# Patient Record
Sex: FEMALE | Race: WHITE
[De-identification: names, ages, dates, MRNs, and addresses within clinical notes are randomized per-mention and may not be internally consistent; named-entity substitution may affect disease eponyms.]

---

## 2019-02-26 ENCOUNTER — HOSPITAL ENCOUNTER (OUTPATIENT)
Dept: HOSPITAL 8 - CFH | Age: 54
Discharge: HOME | End: 2019-02-26
Attending: FAMILY MEDICINE
Payer: COMMERCIAL

## 2019-02-26 DIAGNOSIS — N60.02: Primary | ICD-10-CM

## 2021-01-19 ENCOUNTER — NON-PROVIDER VISIT (OUTPATIENT)
Dept: OCCUPATIONAL MEDICINE | Facility: CLINIC | Age: 56
End: 2021-01-19

## 2021-01-19 DIAGNOSIS — Z11.1 ENCOUNTER FOR PPD TEST: ICD-10-CM

## 2021-01-19 DIAGNOSIS — Z02.1 PRE-EMPLOYMENT DRUG SCREENING: ICD-10-CM

## 2021-01-19 LAB
AMP AMPHETAMINE: NORMAL
COC COCAINE: NORMAL
INT CON NEG: NORMAL
INT CON POS: NORMAL
MET METHAMPHETAMINES: NORMAL
OPI OPIATES: NORMAL
PCP PHENCYCLIDINE: NORMAL
POC DRUG COMMENT 753798-OCCUPATIONAL HEALTH: NEGATIVE
THC: NORMAL

## 2021-01-19 PROCEDURE — 86580 TB INTRADERMAL TEST: CPT | Performed by: PREVENTIVE MEDICINE

## 2021-01-19 PROCEDURE — 80305 DRUG TEST PRSMV DIR OPT OBS: CPT | Performed by: PREVENTIVE MEDICINE

## 2021-01-21 ENCOUNTER — NON-PROVIDER VISIT (OUTPATIENT)
Dept: OCCUPATIONAL MEDICINE | Facility: CLINIC | Age: 56
End: 2021-01-21

## 2021-01-21 LAB — TB WHEAL 3D P 5 TU DIAM: NORMAL MM

## 2022-05-17 ENCOUNTER — PRE-ADMISSION TESTING (OUTPATIENT)
Dept: ADMISSIONS | Facility: MEDICAL CENTER | Age: 57
End: 2022-05-17
Attending: ORTHOPAEDIC SURGERY
Payer: COMMERCIAL

## 2022-05-17 DIAGNOSIS — Z01.810 PRE-OPERATIVE CARDIOVASCULAR EXAMINATION: ICD-10-CM

## 2022-05-17 DIAGNOSIS — Z01.812 PRE-OPERATIVE LABORATORY EXAMINATION: ICD-10-CM

## 2022-05-17 LAB
ABO GROUP BLD: NORMAL
ANION GAP SERPL CALC-SCNC: 15 MMOL/L (ref 7–16)
BUN SERPL-MCNC: 14 MG/DL (ref 8–22)
CALCIUM SERPL-MCNC: 9.7 MG/DL (ref 8.4–10.2)
CHLORIDE SERPL-SCNC: 102 MMOL/L (ref 96–112)
CO2 SERPL-SCNC: 26 MMOL/L (ref 20–33)
CREAT SERPL-MCNC: 0.57 MG/DL (ref 0.5–1.4)
ERYTHROCYTE [DISTWIDTH] IN BLOOD BY AUTOMATED COUNT: 38.9 FL (ref 35.9–50)
GFR SERPLBLD CREATININE-BSD FMLA CKD-EPI: 106 ML/MIN/1.73 M 2
GLUCOSE SERPL-MCNC: 97 MG/DL (ref 65–99)
HCT VFR BLD AUTO: 40.7 % (ref 37–47)
HGB BLD-MCNC: 13.5 G/DL (ref 12–16)
MCH RBC QN AUTO: 30.1 PG (ref 27–33)
MCHC RBC AUTO-ENTMCNC: 33.2 G/DL (ref 33.6–35)
MCV RBC AUTO: 90.8 FL (ref 81.4–97.8)
PLATELET # BLD AUTO: 255 K/UL (ref 164–446)
PMV BLD AUTO: 11.1 FL (ref 9–12.9)
POTASSIUM SERPL-SCNC: 3.6 MMOL/L (ref 3.6–5.5)
RBC # BLD AUTO: 4.48 M/UL (ref 4.2–5.4)
RH BLD: NORMAL
SCCMEC + MECA PNL NOSE NAA+PROBE: NEGATIVE
SCCMEC + MECA PNL NOSE NAA+PROBE: NEGATIVE
SODIUM SERPL-SCNC: 143 MMOL/L (ref 135–145)
WBC # BLD AUTO: 8.1 K/UL (ref 4.8–10.8)

## 2022-05-17 PROCEDURE — 93005 ELECTROCARDIOGRAM TRACING: CPT

## 2022-05-17 PROCEDURE — 87641 MR-STAPH DNA AMP PROBE: CPT

## 2022-05-17 PROCEDURE — 86901 BLOOD TYPING SEROLOGIC RH(D): CPT

## 2022-05-17 PROCEDURE — 87640 STAPH A DNA AMP PROBE: CPT

## 2022-05-17 PROCEDURE — 86900 BLOOD TYPING SEROLOGIC ABO: CPT

## 2022-05-17 PROCEDURE — 85027 COMPLETE CBC AUTOMATED: CPT

## 2022-05-17 PROCEDURE — 36415 COLL VENOUS BLD VENIPUNCTURE: CPT

## 2022-05-17 PROCEDURE — 80048 BASIC METABOLIC PNL TOTAL CA: CPT

## 2022-05-17 RX ORDER — BENZONATATE 100 MG/1
CAPSULE ORAL
COMMUNITY
End: 2022-05-17

## 2022-05-17 RX ORDER — CITALOPRAM HYDROBROMIDE 10 MG/1
10 TABLET ORAL DAILY
COMMUNITY
Start: 2022-01-01

## 2022-05-17 RX ORDER — DOXYCYCLINE HYCLATE 100 MG
TABLET ORAL
COMMUNITY
End: 2022-05-17

## 2022-05-17 RX ORDER — OXYCODONE AND ACETAMINOPHEN 10; 325 MG/1; MG/1
TABLET ORAL
Status: ON HOLD | COMMUNITY
Start: 2022-05-16 | End: 2022-10-30

## 2022-05-17 RX ORDER — ALBUTEROL SULFATE 90 UG/1
AEROSOL, METERED RESPIRATORY (INHALATION)
COMMUNITY
Start: 2020-08-11 | End: 2022-08-06

## 2022-05-17 RX ORDER — FLUTICASONE PROPIONATE 50 MCG
1 SPRAY, SUSPENSION (ML) NASAL PRN
COMMUNITY

## 2022-05-17 RX ORDER — MELOXICAM 7.5 MG/1
7.5 TABLET ORAL PRN
Status: ON HOLD | COMMUNITY
End: 2022-11-04

## 2022-05-17 RX ORDER — HYDROCHLOROTHIAZIDE 12.5 MG/1
TABLET ORAL
COMMUNITY
End: 2022-05-17

## 2022-05-17 RX ORDER — LISINOPRIL AND HYDROCHLOROTHIAZIDE 20; 12.5 MG/1; MG/1
1 TABLET ORAL DAILY
COMMUNITY
Start: 2022-01-01

## 2022-05-17 RX ORDER — PREDNISONE 10 MG/1
TABLET ORAL
COMMUNITY
End: 2022-05-17

## 2022-05-17 RX ORDER — LISINOPRIL 10 MG/1
TABLET ORAL
COMMUNITY
End: 2022-05-17

## 2022-05-17 RX ORDER — BUDESONIDE AND FORMOTEROL FUMARATE DIHYDRATE 160; 4.5 UG/1; UG/1
2 AEROSOL RESPIRATORY (INHALATION) 2 TIMES DAILY
Status: ON HOLD | COMMUNITY
End: 2022-10-30

## 2022-05-17 RX ORDER — ASPIRIN 81 MG/1
TABLET, COATED ORAL
COMMUNITY
Start: 2022-05-16 | End: 2022-05-17

## 2022-05-17 RX ORDER — OLOPATADINE HYDROCHLORIDE 665 UG/1
SPRAY NASAL
COMMUNITY
End: 2022-05-17

## 2022-05-17 RX ORDER — ASPIRIN 81 MG/1
TABLET ORAL
COMMUNITY
End: 2022-05-17

## 2022-05-17 RX ORDER — FENOFIBRATE 160 MG/1
160 TABLET ORAL
COMMUNITY
Start: 2022-01-01

## 2022-05-17 RX ORDER — AZITHROMYCIN 250 MG/1
TABLET, FILM COATED ORAL
COMMUNITY
End: 2022-05-17

## 2022-05-17 RX ORDER — DOCUSATE SODIUM 100 MG/1
100 CAPSULE, LIQUID FILLED ORAL 2 TIMES DAILY PRN
COMMUNITY
End: 2023-05-29

## 2022-05-17 RX ORDER — TRAZODONE HYDROCHLORIDE 50 MG/1
50 TABLET ORAL NIGHTLY
COMMUNITY
Start: 2022-01-01

## 2022-05-17 RX ORDER — ROSUVASTATIN CALCIUM 10 MG/1
10 TABLET, COATED ORAL
COMMUNITY
Start: 2022-01-01

## 2022-05-17 RX ORDER — EZETIMIBE 10 MG/1
TABLET ORAL
COMMUNITY
Start: 2022-03-23 | End: 2022-05-17

## 2022-05-17 RX ORDER — LISINOPRIL AND HYDROCHLOROTHIAZIDE 12.5; 1 MG/1; MG/1
TABLET ORAL
COMMUNITY
End: 2022-05-17

## 2022-05-17 NOTE — OR NURSING
"Preadmit appointment: \" Preparing for your Procedure information\" sheet given to patient with verbal and written instructions. Patient instructed to continue prescribed medications through the day before surgery, instructed to take the following medications the day of surgery per anesthesia protocol: ALBUTEROL, written and verbal instructions given to bring DOS; pt verbally repeated meds instructions.           Verbal and written, and pre-admit video website instructions provided.     Pt states she had a stress test and cardiac CT at [facility] on 05/05/22; FAX [facility] requesting test reports FAX to [phone] STAT.     "

## 2022-05-17 NOTE — DISCHARGE PLANNING
DISCHARGE PLANNING NOTE - TOTAL JOINT    Procedure: Procedure(s):  ARTHROPLASTY, HIP, TOTAL - ANY OTHER INDICATED PROCEDURES  Procedure Date: 5/19/2022  Insurance: Payor: [employer] / Plan: [place] BCBS / Product Type: *No Product type* /    Equipment currently available at home?  raised toilet seat  Steps into the home? 1  Steps within the home? 0  Toilet height? Standard  Type of shower? walk-in shower  Who will be with you during your recovery? Spouse.  Is Outpatient Physical Therapy set up after surgery? No   Did you take the Total Joint Class and where? Yes  Planning same day discharge?Yes     This writer met with pt and spouse during her preadmission appt. Pt will need a fww. Home safety checklist reviewed and copy given to pt. Pt educated to dc criteria. All questions answered and verbalizes understanding of all instructions. No dc needs identified at this time. Anticipate dc to home without barriers.

## 2022-05-18 LAB — EKG IMPRESSION: NORMAL

## 2022-05-18 PROCEDURE — 93010 ELECTROCARDIOGRAM REPORT: CPT | Performed by: INTERNAL MEDICINE

## 2022-05-19 ENCOUNTER — HOSPITAL ENCOUNTER (OUTPATIENT)
Facility: MEDICAL CENTER | Age: 57
End: 2022-05-19
Attending: ORTHOPAEDIC SURGERY | Admitting: ORTHOPAEDIC SURGERY
Payer: COMMERCIAL

## 2022-05-19 ENCOUNTER — ANESTHESIA EVENT (OUTPATIENT)
Dept: SURGERY | Facility: MEDICAL CENTER | Age: 57
End: 2022-05-19
Payer: COMMERCIAL

## 2022-05-19 ENCOUNTER — ANESTHESIA (OUTPATIENT)
Dept: SURGERY | Facility: MEDICAL CENTER | Age: 57
End: 2022-05-19
Payer: COMMERCIAL

## 2022-05-19 ENCOUNTER — APPOINTMENT (OUTPATIENT)
Dept: RADIOLOGY | Facility: MEDICAL CENTER | Age: 57
End: 2022-05-19
Attending: ORTHOPAEDIC SURGERY
Payer: COMMERCIAL

## 2022-05-19 VITALS
TEMPERATURE: 97.1 F | HEART RATE: 100 BPM | DIASTOLIC BLOOD PRESSURE: 73 MMHG | WEIGHT: 215.39 LBS | BODY MASS INDEX: 39.64 KG/M2 | HEIGHT: 62 IN | RESPIRATION RATE: 18 BRPM | OXYGEN SATURATION: 93 % | SYSTOLIC BLOOD PRESSURE: 119 MMHG

## 2022-05-19 DIAGNOSIS — Z96.642 S/P TOTAL LEFT HIP ARTHROPLASTY: ICD-10-CM

## 2022-05-19 DIAGNOSIS — Z01.812 PRE-OPERATIVE LABORATORY EXAMINATION: ICD-10-CM

## 2022-05-19 LAB
ABO GROUP BLD: NORMAL
BLD GP AB SCN SERPL QL: NORMAL
RH BLD: NORMAL

## 2022-05-19 PROCEDURE — 72170 X-RAY EXAM OF PELVIS: CPT

## 2022-05-19 PROCEDURE — A9270 NON-COVERED ITEM OR SERVICE: HCPCS | Performed by: ORTHOPAEDIC SURGERY

## 2022-05-19 PROCEDURE — 01214 ANES OPEN PX TOT HIP ARTHRP: CPT | Performed by: ANESTHESIOLOGY

## 2022-05-19 PROCEDURE — 96375 TX/PRO/DX INJ NEW DRUG ADDON: CPT

## 2022-05-19 PROCEDURE — 94760 N-INVAS EAR/PLS OXIMETRY 1: CPT

## 2022-05-19 PROCEDURE — 700111 HCHG RX REV CODE 636 W/ 250 OVERRIDE (IP): Performed by: ANESTHESIOLOGY

## 2022-05-19 PROCEDURE — C1776 JOINT DEVICE (IMPLANTABLE): HCPCS | Performed by: ORTHOPAEDIC SURGERY

## 2022-05-19 PROCEDURE — 86901 BLOOD TYPING SEROLOGIC RH(D): CPT

## 2022-05-19 PROCEDURE — 160042 HCHG SURGERY MINUTES - EA ADDL 1 MIN LEVEL 5: Performed by: ORTHOPAEDIC SURGERY

## 2022-05-19 PROCEDURE — 700101 HCHG RX REV CODE 250: Performed by: ANESTHESIOLOGY

## 2022-05-19 PROCEDURE — 160009 HCHG ANES TIME/MIN: Performed by: ORTHOPAEDIC SURGERY

## 2022-05-19 PROCEDURE — A9270 NON-COVERED ITEM OR SERVICE: HCPCS | Performed by: ANESTHESIOLOGY

## 2022-05-19 PROCEDURE — 160035 HCHG PACU - 1ST 60 MINS PHASE I: Performed by: ORTHOPAEDIC SURGERY

## 2022-05-19 PROCEDURE — 700105 HCHG RX REV CODE 258: Performed by: ORTHOPAEDIC SURGERY

## 2022-05-19 PROCEDURE — 96365 THER/PROPH/DIAG IV INF INIT: CPT

## 2022-05-19 PROCEDURE — C1713 ANCHOR/SCREW BN/BN,TIS/BN: HCPCS | Performed by: ORTHOPAEDIC SURGERY

## 2022-05-19 PROCEDURE — 700102 HCHG RX REV CODE 250 W/ 637 OVERRIDE(OP): Performed by: ANESTHESIOLOGY

## 2022-05-19 PROCEDURE — 97162 PT EVAL MOD COMPLEX 30 MIN: CPT

## 2022-05-19 PROCEDURE — 501838 HCHG SUTURE GENERAL: Performed by: ORTHOPAEDIC SURGERY

## 2022-05-19 PROCEDURE — 36415 COLL VENOUS BLD VENIPUNCTURE: CPT

## 2022-05-19 PROCEDURE — 700101 HCHG RX REV CODE 250: Performed by: ORTHOPAEDIC SURGERY

## 2022-05-19 PROCEDURE — G0378 HOSPITAL OBSERVATION PER HR: HCPCS

## 2022-05-19 PROCEDURE — 160031 HCHG SURGERY MINUTES - 1ST 30 MINS LEVEL 5: Performed by: ORTHOPAEDIC SURGERY

## 2022-05-19 PROCEDURE — 160036 HCHG PACU - EA ADDL 30 MINS PHASE I: Performed by: ORTHOPAEDIC SURGERY

## 2022-05-19 PROCEDURE — 700102 HCHG RX REV CODE 250 W/ 637 OVERRIDE(OP): Performed by: ORTHOPAEDIC SURGERY

## 2022-05-19 PROCEDURE — 700111 HCHG RX REV CODE 636 W/ 250 OVERRIDE (IP): Performed by: ORTHOPAEDIC SURGERY

## 2022-05-19 PROCEDURE — 97165 OT EVAL LOW COMPLEX 30 MIN: CPT

## 2022-05-19 PROCEDURE — 502000 HCHG MISC OR IMPLANTS RC 0278: Performed by: ORTHOPAEDIC SURGERY

## 2022-05-19 PROCEDURE — 160048 HCHG OR STATISTICAL LEVEL 1-5: Performed by: ORTHOPAEDIC SURGERY

## 2022-05-19 PROCEDURE — 86850 RBC ANTIBODY SCREEN: CPT

## 2022-05-19 PROCEDURE — 160002 HCHG RECOVERY MINUTES (STAT): Performed by: ORTHOPAEDIC SURGERY

## 2022-05-19 PROCEDURE — 96376 TX/PRO/DX INJ SAME DRUG ADON: CPT

## 2022-05-19 PROCEDURE — 97535 SELF CARE MNGMENT TRAINING: CPT

## 2022-05-19 DEVICE — IMPLANTABLE DEVICE: Type: IMPLANTABLE DEVICE | Site: HIP | Status: FUNCTIONAL

## 2022-05-19 RX ORDER — ONDANSETRON 2 MG/ML
4 INJECTION INTRAMUSCULAR; INTRAVENOUS
Status: DISCONTINUED | OUTPATIENT
Start: 2022-05-19 | End: 2022-05-19 | Stop reason: HOSPADM

## 2022-05-19 RX ORDER — LIDOCAINE HYDROCHLORIDE 20 MG/ML
INJECTION, SOLUTION EPIDURAL; INFILTRATION; INTRACAUDAL; PERINEURAL PRN
Status: DISCONTINUED | OUTPATIENT
Start: 2022-05-19 | End: 2022-05-19 | Stop reason: SURG

## 2022-05-19 RX ORDER — ACETAMINOPHEN 500 MG
1000 TABLET ORAL EVERY 6 HOURS PRN
Status: DISCONTINUED | OUTPATIENT
Start: 2022-05-24 | End: 2022-05-19 | Stop reason: HOSPADM

## 2022-05-19 RX ORDER — ACETAMINOPHEN 500 MG
1000 TABLET ORAL ONCE
Status: COMPLETED | OUTPATIENT
Start: 2022-05-19 | End: 2022-05-19

## 2022-05-19 RX ORDER — HYDRALAZINE HYDROCHLORIDE 20 MG/ML
5 INJECTION INTRAMUSCULAR; INTRAVENOUS
Status: DISCONTINUED | OUTPATIENT
Start: 2022-05-19 | End: 2022-05-19 | Stop reason: HOSPADM

## 2022-05-19 RX ORDER — OXYCODONE HCL 10 MG/1
10 TABLET, FILM COATED, EXTENDED RELEASE ORAL ONCE
Status: COMPLETED | OUTPATIENT
Start: 2022-05-19 | End: 2022-05-19

## 2022-05-19 RX ORDER — ENEMA 19; 7 G/133ML; G/133ML
1 ENEMA RECTAL
Status: DISCONTINUED | OUTPATIENT
Start: 2022-05-19 | End: 2022-05-19 | Stop reason: HOSPADM

## 2022-05-19 RX ORDER — OXYCODONE HCL 5 MG/5 ML
10 SOLUTION, ORAL ORAL
Status: COMPLETED | OUTPATIENT
Start: 2022-05-19 | End: 2022-05-19

## 2022-05-19 RX ORDER — HYDROMORPHONE HYDROCHLORIDE 1 MG/ML
0.5 INJECTION, SOLUTION INTRAMUSCULAR; INTRAVENOUS; SUBCUTANEOUS
Status: DISCONTINUED | OUTPATIENT
Start: 2022-05-19 | End: 2022-05-19 | Stop reason: HOSPADM

## 2022-05-19 RX ORDER — CITALOPRAM 20 MG/1
10 TABLET ORAL DAILY
Status: DISCONTINUED | OUTPATIENT
Start: 2022-05-20 | End: 2022-05-19 | Stop reason: HOSPADM

## 2022-05-19 RX ORDER — HYDROMORPHONE HYDROCHLORIDE 1 MG/ML
0.4 INJECTION, SOLUTION INTRAMUSCULAR; INTRAVENOUS; SUBCUTANEOUS
Status: DISCONTINUED | OUTPATIENT
Start: 2022-05-19 | End: 2022-05-19 | Stop reason: HOSPADM

## 2022-05-19 RX ORDER — ROCURONIUM BROMIDE 10 MG/ML
INJECTION, SOLUTION INTRAVENOUS PRN
Status: DISCONTINUED | OUTPATIENT
Start: 2022-05-19 | End: 2022-05-19 | Stop reason: SURG

## 2022-05-19 RX ORDER — TRANEXAMIC ACID 100 MG/ML
INJECTION, SOLUTION INTRAVENOUS PRN
Status: DISCONTINUED | OUTPATIENT
Start: 2022-05-19 | End: 2022-05-19 | Stop reason: SURG

## 2022-05-19 RX ORDER — IBUPROFEN 400 MG/1
800 TABLET ORAL 3 TIMES DAILY PRN
Status: DISCONTINUED | OUTPATIENT
Start: 2022-05-22 | End: 2022-05-19 | Stop reason: HOSPADM

## 2022-05-19 RX ORDER — MIDAZOLAM HYDROCHLORIDE 1 MG/ML
INJECTION INTRAMUSCULAR; INTRAVENOUS PRN
Status: DISCONTINUED | OUTPATIENT
Start: 2022-05-19 | End: 2022-05-19 | Stop reason: SURG

## 2022-05-19 RX ORDER — MIDAZOLAM HYDROCHLORIDE 1 MG/ML
1 INJECTION INTRAMUSCULAR; INTRAVENOUS
Status: DISCONTINUED | OUTPATIENT
Start: 2022-05-19 | End: 2022-05-19 | Stop reason: HOSPADM

## 2022-05-19 RX ORDER — DIPHENHYDRAMINE HYDROCHLORIDE 50 MG/ML
12.5 INJECTION INTRAMUSCULAR; INTRAVENOUS
Status: DISCONTINUED | OUTPATIENT
Start: 2022-05-19 | End: 2022-05-19 | Stop reason: HOSPADM

## 2022-05-19 RX ORDER — FENOFIBRATE 134 MG/1
134 CAPSULE ORAL
Status: DISCONTINUED | OUTPATIENT
Start: 2022-05-19 | End: 2022-05-19 | Stop reason: HOSPADM

## 2022-05-19 RX ORDER — POLYETHYLENE GLYCOL 3350 17 G/17G
1 POWDER, FOR SOLUTION ORAL 2 TIMES DAILY PRN
Status: DISCONTINUED | OUTPATIENT
Start: 2022-05-19 | End: 2022-05-19 | Stop reason: HOSPADM

## 2022-05-19 RX ORDER — DIPHENHYDRAMINE HYDROCHLORIDE 50 MG/ML
25 INJECTION INTRAMUSCULAR; INTRAVENOUS EVERY 6 HOURS PRN
Status: DISCONTINUED | OUTPATIENT
Start: 2022-05-19 | End: 2022-05-19 | Stop reason: HOSPADM

## 2022-05-19 RX ORDER — CELECOXIB 200 MG/1
400 CAPSULE ORAL ONCE
Status: COMPLETED | OUTPATIENT
Start: 2022-05-19 | End: 2022-05-19

## 2022-05-19 RX ORDER — TRAZODONE HYDROCHLORIDE 50 MG/1
50 TABLET ORAL NIGHTLY
Status: DISCONTINUED | OUTPATIENT
Start: 2022-05-19 | End: 2022-05-19 | Stop reason: HOSPADM

## 2022-05-19 RX ORDER — DOCUSATE SODIUM 100 MG/1
100 CAPSULE, LIQUID FILLED ORAL 2 TIMES DAILY
Status: DISCONTINUED | OUTPATIENT
Start: 2022-05-19 | End: 2022-05-19 | Stop reason: HOSPADM

## 2022-05-19 RX ORDER — HYDROMORPHONE HYDROCHLORIDE 1 MG/ML
0.2 INJECTION, SOLUTION INTRAMUSCULAR; INTRAVENOUS; SUBCUTANEOUS
Status: DISCONTINUED | OUTPATIENT
Start: 2022-05-19 | End: 2022-05-19 | Stop reason: HOSPADM

## 2022-05-19 RX ORDER — ASCORBIC ACID 500 MG
500 TABLET ORAL DAILY
Status: DISCONTINUED | OUTPATIENT
Start: 2022-05-19 | End: 2022-05-19 | Stop reason: HOSPADM

## 2022-05-19 RX ORDER — METOPROLOL TARTRATE 1 MG/ML
1 INJECTION, SOLUTION INTRAVENOUS
Status: DISCONTINUED | OUTPATIENT
Start: 2022-05-19 | End: 2022-05-19 | Stop reason: HOSPADM

## 2022-05-19 RX ORDER — AMOXICILLIN 250 MG
1 CAPSULE ORAL
Status: DISCONTINUED | OUTPATIENT
Start: 2022-05-19 | End: 2022-05-19 | Stop reason: HOSPADM

## 2022-05-19 RX ORDER — SODIUM CHLORIDE, SODIUM LACTATE, POTASSIUM CHLORIDE, CALCIUM CHLORIDE 600; 310; 30; 20 MG/100ML; MG/100ML; MG/100ML; MG/100ML
INJECTION, SOLUTION INTRAVENOUS CONTINUOUS
Status: DISCONTINUED | OUTPATIENT
Start: 2022-05-19 | End: 2022-05-19 | Stop reason: HOSPADM

## 2022-05-19 RX ORDER — DOXYCYCLINE 100 MG/1
100 CAPSULE ORAL 2 TIMES DAILY
Qty: 14 CAPSULE | Refills: 0 | Status: SHIPPED | OUTPATIENT
Start: 2022-05-19 | End: 2022-05-26

## 2022-05-19 RX ORDER — ONDANSETRON 2 MG/ML
INJECTION INTRAMUSCULAR; INTRAVENOUS PRN
Status: DISCONTINUED | OUTPATIENT
Start: 2022-05-19 | End: 2022-05-19 | Stop reason: SURG

## 2022-05-19 RX ORDER — BUDESONIDE AND FORMOTEROL FUMARATE DIHYDRATE 160; 4.5 UG/1; UG/1
2 AEROSOL RESPIRATORY (INHALATION) 2 TIMES DAILY
Status: DISCONTINUED | OUTPATIENT
Start: 2022-05-19 | End: 2022-05-19 | Stop reason: HOSPADM

## 2022-05-19 RX ORDER — MEPERIDINE HYDROCHLORIDE 25 MG/ML
12.5 INJECTION INTRAMUSCULAR; INTRAVENOUS; SUBCUTANEOUS
Status: DISCONTINUED | OUTPATIENT
Start: 2022-05-19 | End: 2022-05-19 | Stop reason: HOSPADM

## 2022-05-19 RX ORDER — OXYCODONE HCL 5 MG/5 ML
5 SOLUTION, ORAL ORAL
Status: COMPLETED | OUTPATIENT
Start: 2022-05-19 | End: 2022-05-19

## 2022-05-19 RX ORDER — BISACODYL 10 MG
10 SUPPOSITORY, RECTAL RECTAL
Status: DISCONTINUED | OUTPATIENT
Start: 2022-05-19 | End: 2022-05-19 | Stop reason: HOSPADM

## 2022-05-19 RX ORDER — VANCOMYCIN HYDROCHLORIDE 1 G/20ML
INJECTION, POWDER, LYOPHILIZED, FOR SOLUTION INTRAVENOUS
Status: COMPLETED | OUTPATIENT
Start: 2022-05-19 | End: 2022-05-19

## 2022-05-19 RX ORDER — ONDANSETRON 2 MG/ML
4 INJECTION INTRAMUSCULAR; INTRAVENOUS EVERY 4 HOURS PRN
Status: DISCONTINUED | OUTPATIENT
Start: 2022-05-19 | End: 2022-05-19 | Stop reason: HOSPADM

## 2022-05-19 RX ORDER — CEFAZOLIN SODIUM 1 G/3ML
INJECTION, POWDER, FOR SOLUTION INTRAMUSCULAR; INTRAVENOUS PRN
Status: DISCONTINUED | OUTPATIENT
Start: 2022-05-19 | End: 2022-05-19 | Stop reason: SURG

## 2022-05-19 RX ORDER — SODIUM CHLORIDE, SODIUM LACTATE, POTASSIUM CHLORIDE, CALCIUM CHLORIDE 600; 310; 30; 20 MG/100ML; MG/100ML; MG/100ML; MG/100ML
INJECTION, SOLUTION INTRAVENOUS CONTINUOUS
Status: ACTIVE | OUTPATIENT
Start: 2022-05-19 | End: 2022-05-19

## 2022-05-19 RX ORDER — BUPIVACAINE HYDROCHLORIDE AND EPINEPHRINE 2.5; 5 MG/ML; UG/ML
INJECTION, SOLUTION EPIDURAL; INFILTRATION; INTRACAUDAL; PERINEURAL
Status: DISCONTINUED | OUTPATIENT
Start: 2022-05-19 | End: 2022-05-19 | Stop reason: HOSPADM

## 2022-05-19 RX ORDER — LISINOPRIL AND HYDROCHLOROTHIAZIDE 20; 12.5 MG/1; MG/1
1 TABLET ORAL DAILY
Status: DISCONTINUED | OUTPATIENT
Start: 2022-05-19 | End: 2022-05-19

## 2022-05-19 RX ORDER — HYDROMORPHONE HYDROCHLORIDE 1 MG/ML
0.1 INJECTION, SOLUTION INTRAMUSCULAR; INTRAVENOUS; SUBCUTANEOUS
Status: DISCONTINUED | OUTPATIENT
Start: 2022-05-19 | End: 2022-05-19 | Stop reason: HOSPADM

## 2022-05-19 RX ORDER — LISINOPRIL 20 MG/1
20 TABLET ORAL
Status: DISCONTINUED | OUTPATIENT
Start: 2022-05-19 | End: 2022-05-19 | Stop reason: HOSPADM

## 2022-05-19 RX ORDER — DEXAMETHASONE SODIUM PHOSPHATE 4 MG/ML
INJECTION, SOLUTION INTRA-ARTICULAR; INTRALESIONAL; INTRAMUSCULAR; INTRAVENOUS; SOFT TISSUE PRN
Status: DISCONTINUED | OUTPATIENT
Start: 2022-05-19 | End: 2022-05-19 | Stop reason: SURG

## 2022-05-19 RX ORDER — ALBUTEROL SULFATE 90 UG/1
1 AEROSOL, METERED RESPIRATORY (INHALATION) EVERY 4 HOURS PRN
Status: DISCONTINUED | OUTPATIENT
Start: 2022-05-19 | End: 2022-05-19 | Stop reason: HOSPADM

## 2022-05-19 RX ORDER — OXYCODONE HYDROCHLORIDE 5 MG/1
5 TABLET ORAL
Status: DISCONTINUED | OUTPATIENT
Start: 2022-05-19 | End: 2022-05-19 | Stop reason: HOSPADM

## 2022-05-19 RX ORDER — HALOPERIDOL 5 MG/ML
1 INJECTION INTRAMUSCULAR
Status: DISCONTINUED | OUTPATIENT
Start: 2022-05-19 | End: 2022-05-19 | Stop reason: HOSPADM

## 2022-05-19 RX ORDER — ASCORBIC ACID 500 MG
500 TABLET ORAL DAILY
Status: DISCONTINUED | OUTPATIENT
Start: 2022-05-19 | End: 2022-05-19

## 2022-05-19 RX ORDER — CHOLECALCIFEROL (VITAMIN D3) 125 MCG
500 CAPSULE ORAL DAILY
Status: DISCONTINUED | OUTPATIENT
Start: 2022-05-19 | End: 2022-05-19 | Stop reason: HOSPADM

## 2022-05-19 RX ORDER — ROSUVASTATIN CALCIUM 10 MG/1
10 TABLET, COATED ORAL
Status: DISCONTINUED | OUTPATIENT
Start: 2022-05-19 | End: 2022-05-19 | Stop reason: HOSPADM

## 2022-05-19 RX ORDER — ACETAMINOPHEN 500 MG
1000 TABLET ORAL EVERY 6 HOURS
Status: DISCONTINUED | OUTPATIENT
Start: 2022-05-19 | End: 2022-05-19 | Stop reason: HOSPADM

## 2022-05-19 RX ORDER — KETOROLAC TROMETHAMINE 30 MG/ML
30 INJECTION, SOLUTION INTRAMUSCULAR; INTRAVENOUS EVERY 6 HOURS
Status: DISCONTINUED | OUTPATIENT
Start: 2022-05-19 | End: 2022-05-19 | Stop reason: HOSPADM

## 2022-05-19 RX ORDER — AMOXICILLIN 250 MG
1 CAPSULE ORAL NIGHTLY
Status: DISCONTINUED | OUTPATIENT
Start: 2022-05-19 | End: 2022-05-19 | Stop reason: HOSPADM

## 2022-05-19 RX ORDER — VITAMIN B COMPLEX
1000 TABLET ORAL DAILY
Status: DISCONTINUED | OUTPATIENT
Start: 2022-05-19 | End: 2022-05-19 | Stop reason: HOSPADM

## 2022-05-19 RX ORDER — HYDROCHLOROTHIAZIDE 12.5 MG/1
12.5 TABLET ORAL
Status: DISCONTINUED | OUTPATIENT
Start: 2022-05-19 | End: 2022-05-19 | Stop reason: HOSPADM

## 2022-05-19 RX ORDER — SCOLOPAMINE TRANSDERMAL SYSTEM 1 MG/1
1 PATCH, EXTENDED RELEASE TRANSDERMAL
Status: DISCONTINUED | OUTPATIENT
Start: 2022-05-19 | End: 2022-05-19 | Stop reason: HOSPADM

## 2022-05-19 RX ORDER — OXYCODONE HYDROCHLORIDE 10 MG/1
10 TABLET ORAL
Status: DISCONTINUED | OUTPATIENT
Start: 2022-05-19 | End: 2022-05-19 | Stop reason: HOSPADM

## 2022-05-19 RX ORDER — PHENYLEPHRINE HCL IN 0.9% NACL 0.5 MG/5ML
SYRINGE (ML) INTRAVENOUS PRN
Status: DISCONTINUED | OUTPATIENT
Start: 2022-05-19 | End: 2022-05-19 | Stop reason: SURG

## 2022-05-19 RX ADMIN — FENTANYL CITRATE 25 MCG: 50 INJECTION, SOLUTION INTRAMUSCULAR; INTRAVENOUS at 10:16

## 2022-05-19 RX ADMIN — EPHEDRINE SULFATE 10 MG: 50 INJECTION INTRAMUSCULAR; INTRAVENOUS; SUBCUTANEOUS at 07:53

## 2022-05-19 RX ADMIN — LIDOCAINE HYDROCHLORIDE 100 MG: 20 INJECTION, SOLUTION EPIDURAL; INFILTRATION; INTRACAUDAL; PERINEURAL at 07:39

## 2022-05-19 RX ADMIN — FENTANYL CITRATE 25 MCG: 50 INJECTION, SOLUTION INTRAMUSCULAR; INTRAVENOUS at 10:41

## 2022-05-19 RX ADMIN — Medication 100 MCG: at 09:16

## 2022-05-19 RX ADMIN — Medication 100 MCG: at 09:00

## 2022-05-19 RX ADMIN — ACETAMINOPHEN 1000 MG: 500 TABLET, FILM COATED ORAL at 14:32

## 2022-05-19 RX ADMIN — KETOROLAC TROMETHAMINE 30 MG: 30 INJECTION, SOLUTION INTRAMUSCULAR at 14:33

## 2022-05-19 RX ADMIN — Medication 100 MCG: at 09:08

## 2022-05-19 RX ADMIN — CEFAZOLIN 2 G: 2 INJECTION, POWDER, FOR SOLUTION INTRAMUSCULAR; INTRAVENOUS at 14:33

## 2022-05-19 RX ADMIN — SODIUM CHLORIDE, POTASSIUM CHLORIDE, SODIUM LACTATE AND CALCIUM CHLORIDE: 600; 310; 30; 20 INJECTION, SOLUTION INTRAVENOUS at 07:20

## 2022-05-19 RX ADMIN — ONDANSETRON 4 MG: 2 INJECTION INTRAMUSCULAR; INTRAVENOUS at 09:14

## 2022-05-19 RX ADMIN — DEXAMETHASONE SODIUM PHOSPHATE 4 MG: 4 INJECTION, SOLUTION INTRAMUSCULAR; INTRAVENOUS at 07:39

## 2022-05-19 RX ADMIN — Medication 200 MCG: at 08:49

## 2022-05-19 RX ADMIN — SODIUM CHLORIDE, POTASSIUM CHLORIDE, SODIUM LACTATE AND CALCIUM CHLORIDE: 600; 310; 30; 20 INJECTION, SOLUTION INTRAVENOUS at 09:16

## 2022-05-19 RX ADMIN — CEFAZOLIN 2 G: 330 INJECTION, POWDER, FOR SOLUTION INTRAMUSCULAR; INTRAVENOUS at 07:42

## 2022-05-19 RX ADMIN — KETOROLAC TROMETHAMINE 30 MG: 30 INJECTION, SOLUTION INTRAMUSCULAR at 17:55

## 2022-05-19 RX ADMIN — ACETAMINOPHEN 1000 MG: 500 TABLET ORAL at 07:10

## 2022-05-19 RX ADMIN — FENTANYL CITRATE 100 MCG: 50 INJECTION, SOLUTION INTRAMUSCULAR; INTRAVENOUS at 09:20

## 2022-05-19 RX ADMIN — ROCURONIUM BROMIDE 20 MG: 10 INJECTION, SOLUTION INTRAVENOUS at 08:14

## 2022-05-19 RX ADMIN — FENTANYL CITRATE 100 MCG: 50 INJECTION, SOLUTION INTRAMUSCULAR; INTRAVENOUS at 07:39

## 2022-05-19 RX ADMIN — TRANEXAMIC ACID 1000 MG: 100 INJECTION, SOLUTION INTRAVENOUS at 07:42

## 2022-05-19 RX ADMIN — OXYCODONE HYDROCHLORIDE 10 MG: 5 SOLUTION ORAL at 09:48

## 2022-05-19 RX ADMIN — LIDOCAINE HYDROCHLORIDE 0.5 ML: 10 INJECTION, SOLUTION EPIDURAL; INFILTRATION; INTRACAUDAL; PERINEURAL at 07:19

## 2022-05-19 RX ADMIN — EPHEDRINE SULFATE 10 MG: 50 INJECTION INTRAMUSCULAR; INTRAVENOUS; SUBCUTANEOUS at 08:38

## 2022-05-19 RX ADMIN — EPHEDRINE SULFATE 10 MG: 50 INJECTION INTRAMUSCULAR; INTRAVENOUS; SUBCUTANEOUS at 08:20

## 2022-05-19 RX ADMIN — CELECOXIB 400 MG: 200 CAPSULE ORAL at 07:10

## 2022-05-19 RX ADMIN — Medication 200 MCG: at 08:56

## 2022-05-19 RX ADMIN — OXYCODONE HYDROCHLORIDE 10 MG: 10 TABLET, FILM COATED, EXTENDED RELEASE ORAL at 07:10

## 2022-05-19 RX ADMIN — ACETAMINOPHEN 1000 MG: 500 TABLET, FILM COATED ORAL at 17:54

## 2022-05-19 RX ADMIN — MIDAZOLAM HYDROCHLORIDE 2 MG: 1 INJECTION, SOLUTION INTRAMUSCULAR; INTRAVENOUS at 07:31

## 2022-05-19 RX ADMIN — Medication 100 MCG: at 09:14

## 2022-05-19 RX ADMIN — PROPOFOL 200 MG: 10 INJECTION, EMULSION INTRAVENOUS at 07:39

## 2022-05-19 RX ADMIN — EPHEDRINE SULFATE 10 MG: 50 INJECTION INTRAMUSCULAR; INTRAVENOUS; SUBCUTANEOUS at 08:22

## 2022-05-19 RX ADMIN — TRANEXAMIC ACID 1000 MG: 100 INJECTION, SOLUTION INTRAVENOUS at 09:00

## 2022-05-19 RX ADMIN — FENTANYL CITRATE 25 MCG: 50 INJECTION, SOLUTION INTRAMUSCULAR; INTRAVENOUS at 09:48

## 2022-05-19 RX ADMIN — SUGAMMADEX 200 MG: 100 INJECTION, SOLUTION INTRAVENOUS at 09:26

## 2022-05-19 RX ADMIN — FENTANYL CITRATE 25 MCG: 50 INJECTION, SOLUTION INTRAMUSCULAR; INTRAVENOUS at 10:07

## 2022-05-19 RX ADMIN — FENTANYL CITRATE 50 MCG: 50 INJECTION, SOLUTION INTRAMUSCULAR; INTRAVENOUS at 08:13

## 2022-05-19 RX ADMIN — ROCURONIUM BROMIDE 50 MG: 10 INJECTION, SOLUTION INTRAVENOUS at 07:39

## 2022-05-19 RX ADMIN — ROCURONIUM BROMIDE 20 MG: 10 INJECTION, SOLUTION INTRAVENOUS at 08:43

## 2022-05-19 RX ADMIN — SODIUM CHLORIDE, POTASSIUM CHLORIDE, SODIUM LACTATE AND CALCIUM CHLORIDE: 600; 310; 30; 20 INJECTION, SOLUTION INTRAVENOUS at 08:34

## 2022-05-19 ASSESSMENT — COGNITIVE AND FUNCTIONAL STATUS - GENERAL
DRESSING REGULAR LOWER BODY CLOTHING: A LITTLE
TURNING FROM BACK TO SIDE WHILE IN FLAT BAD: A LITTLE
MOVING TO AND FROM BED TO CHAIR: A LITTLE
MOVING TO AND FROM BED TO CHAIR: A LITTLE
DAILY ACTIVITIY SCORE: 20
PERSONAL GROOMING: A LITTLE
CLIMB 3 TO 5 STEPS WITH RAILING: A LITTLE
TOILETING: A LITTLE
HELP NEEDED FOR BATHING: A LOT
SUGGESTED CMS G CODE MODIFIER DAILY ACTIVITY: CJ
DAILY ACTIVITIY SCORE: 18
CLIMB 3 TO 5 STEPS WITH RAILING: A LITTLE
SUGGESTED CMS G CODE MODIFIER MOBILITY: CJ
DRESSING REGULAR UPPER BODY CLOTHING: A LITTLE
SUGGESTED CMS G CODE MODIFIER DAILY ACTIVITY: CK
MOVING FROM LYING ON BACK TO SITTING ON SIDE OF FLAT BED: A LITTLE
SUGGESTED CMS G CODE MODIFIER MOBILITY: CK
MOBILITY SCORE: 18
DRESSING REGULAR UPPER BODY CLOTHING: A LITTLE
DRESSING REGULAR LOWER BODY CLOTHING: A LITTLE
MOBILITY SCORE: 21
STANDING UP FROM CHAIR USING ARMS: A LITTLE
TURNING FROM BACK TO SIDE WHILE IN FLAT BAD: A LITTLE
TOILETING: A LITTLE
HELP NEEDED FOR BATHING: A LITTLE
WALKING IN HOSPITAL ROOM: A LITTLE

## 2022-05-19 ASSESSMENT — LIFESTYLE VARIABLES
HOW MANY TIMES IN THE PAST YEAR HAVE YOU HAD 5 OR MORE DRINKS IN A DAY: 0
TOTAL SCORE: 0
TOTAL SCORE: 0
ON A TYPICAL DAY WHEN YOU DRINK ALCOHOL HOW MANY DRINKS DO YOU HAVE: 0
ALCOHOL_USE: NO
EVER HAD A DRINK FIRST THING IN THE MORNING TO STEADY YOUR NERVES TO GET RID OF A HANGOVER: NO
AVERAGE NUMBER OF DAYS PER WEEK YOU HAVE A DRINK CONTAINING ALCOHOL: 0
TOTAL SCORE: 0
HAVE YOU EVER FELT YOU SHOULD CUT DOWN ON YOUR DRINKING: NO
HAVE PEOPLE ANNOYED YOU BY CRITICIZING YOUR DRINKING: NO
CONSUMPTION TOTAL: NEGATIVE
EVER FELT BAD OR GUILTY ABOUT YOUR DRINKING: NO

## 2022-05-19 ASSESSMENT — PAIN DESCRIPTION - PAIN TYPE
TYPE: SURGICAL PAIN
TYPE: SURGICAL PAIN

## 2022-05-19 ASSESSMENT — ACTIVITIES OF DAILY LIVING (ADL): TOILETING: INDEPENDENT

## 2022-05-19 ASSESSMENT — GAIT ASSESSMENTS
GAIT LEVEL OF ASSIST: SUPERVISED
DISTANCE (FEET): 100
DEVIATION: STEP TO;ANTALGIC
ASSISTIVE DEVICE: FRONT WHEEL WALKER

## 2022-05-19 ASSESSMENT — PATIENT HEALTH QUESTIONNAIRE - PHQ9
2. FEELING DOWN, DEPRESSED, IRRITABLE, OR HOPELESS: NOT AT ALL
SUM OF ALL RESPONSES TO PHQ9 QUESTIONS 1 AND 2: 0
1. LITTLE INTEREST OR PLEASURE IN DOING THINGS: NOT AT ALL

## 2022-05-19 ASSESSMENT — PAIN SCALES - GENERAL: PAIN_LEVEL: 3

## 2022-05-19 NOTE — DISCHARGE INSTRUCTIONS
Discharge Instructions    Discharged to home by car with relative. Discharged via wheelchair, hospital escort: Yes.  Special equipment needed: Not Applicable    Be sure to schedule a follow-up appointment with your primary care doctor or any specialists as instructed.     Discharge Plan:        I understand that a diet low in cholesterol, fat, and sodium is recommended for good health. Unless I have been given specific instructions below for another diet, I accept this instruction as my diet prescription.   Other diet: Regular    Special Instructions: Discharge instructions for the Orthopedic Patient    Follow up with Primary Care Physician within 2 weeks of discharge to home, regarding:  Review of medications and diagnostic testing.  Surveillance for medical complications.  Workup and treatment of osteoporosis, if appropriate.     -Is this a Hip/Knee/Shoulder Joint Replacement patient? Yes   TOTAL HIP REPLACEMENT, AFTER-CARE GUIDELINES     These instructions provide you with information on caring for yourself and your hip after surgery. Your health care provider may also give you instructions that are more specific. Your treatment was planned and performed according to current medical practices but problems sometimes occur. Call your health care provider if you have any problems or questions.     WHAT TO EXPECT AFTER THE PROCEDURE   After your procedure, your hip will typically be stiff, sore, and bruised. This will improve over time.     Pain   Follow your home pain management plan as discussed with your nurse and as directed by your provider.   It is important to follow any scheduled pain medications for maximal pain relief.   If prescribed opioid medication, the goal is to use opioids only as needed and to wean off prescription pain medicine as soon as possible.   Ice use for pain control.  Put ice in a plastic bag.   Place a towel between your skin and the bag.   Leave the ice on for 20 minutes, 2-3 times a day  at a minimum.   Most patients are off the pain pills by 3 weeks. If your pain continues to be severe, follow up with your provider.     Infection   Deep hip joint infections that require removal of the prostheses occur in less than 1.0% of patients. Lesser infections in the skin (cellulites) are more common and much more easily treated.   Keep the incision as clean and dry as possible.   Always wash your hands before touching your incision.   Avoid dental care for 3 months after surgery. Your provider may recommend taking a dose of antibiotics an hour prior to any dental procedure. After 2 years, most providers recommend antibiotics only before an extensive procedure. Ask your provider what they recommend.   Signs and symptoms of infection include low-grade fever, redness, pain, swelling and drainage from your incision. Notify your provider IMMEDIATELY if you develop ANY of these symptoms.     Post op Disturbances   Bowel Habits - constipation is extremely common and caused by a combination of anesthesia, lack of mobility, dehydration and pain medicine. Use stool softeners or laxatives if necessary. It is important not to ignore this problem as bowel obstructions can be a serious complication after joint replacement surgery.   Mood/Energy Level - Many patients experience a lack of energy and endurance for up to 2-3 months after surgery. Some people feel down and can even become depressed. This is likely due to postoperative anemia, change in activity level, lack of sleep, pain medicine and just the emotional reaction to the surgery itself that is a big disruption in a person’s life. This usually passes. If symptoms persist, follow up with your primary care provider.   Returning to Work - Your provider will give you specific instructions based on your profession. Generally, if you work a sedentary job requiring little standing or walking, most patients may return within 2-6 weeks. Manual labor jobs involving walking,  lifting and standing may take 3-4 months. Your provider’s office can provide a release to part-time or light duty work early on in your recovery and progress you to full duty as able.   Driving - You can begin driving once cleared by your provider, provided you are no longer taking narcotic pain medication or any other medications that impair driving. Discuss the length of time with your doctor as returning to driving will depend on things such as your vehicle, which hip was replaced (right or left) and if you do or do not have post-operative movement precautions.   Avoiding falls - A fall during the first few weeks after surgery can damage your new hip and may result in a need for further surgery.  throw rugs and tack down loose carpeting. Be aware of floor hazards such as pets, small objects or uneven surfaces. Notify your provider of any falls.   Airport Metal Detectors - The sensitivity of metal detectors varies and it is likely that your prosthesis will cause an alarm. Inform the  of your artificial joint.     Diet   Resume your normal diet as tolerated.   It is important to achieve a healthy nutritional status by eating a well-balanced diet on a regular basis.   Your provider may recommend that you take iron and vitamin supplements.   Continue to drink plenty of fluids.     Shower/Bathing   You may shower as soon as you get home from the hospital unless otherwise instructed.   Keep your incision out of water to prevent infection. To keep the incision dry when showering, cover it with a plastic bag or plastic wrap. If your bandage is waterproof, this may not be necessary.   Pat incision dry if it gets wet. Do not rub. Notify your provider.   Do not submerge in a bath until cleared by your provider. Your staples must be out and the incision completely healed.     Dressing Changes: Only change your dressing if directed by your provider.   Wash hands.   Open all dressing change materials.    Remove old dressing and discard.   Inspect incision for signs of irritation or infection including redness, increase in clear drainage, yellow/green drainage, odor and surrounding skin hot to touch. Notify your provider if present.    the new dressing by one corner and lay over the incision. Be careful not to touch the inside of the dressing that will lay over the incision.   Secure in place as instructed.     Swelling/Bruising   Swelling is normal after hip replacement and can involve the thigh, knee, calf and foot.   Swelling can last from 3-6 months.   To reduce swelling, elevate your leg higher than your heart while reclining. The first week you are home you should elevate your leg an equal amount of time as you are active.   The swelling is usually worse after you go home since you are upright for longer periods of time.   Bruising often does not appear until after you arrive home and can be quite dramatic- appearing purple, black, or green. Bruising is typically not concerning and will subside without any treatment.     Blood Clot Prevention   Your treatment plan includes multiple preventative measures to decrease the risk of blood clots in the legs (DVTs) and the less common, but serious, clots that travel to the lungs (pulmonary emboli). Most patients are at standard risk for them, but people who are at higher risk include those who have had previous clots, a family history of clotting, smoking, diabetes, obesity, advanced age, use estrogen and/or live a sedentary lifestyle.     Signs of blood clots in legs include - Swelling in thigh, calf or ankle that does not go down with elevation. Pain, heat and tenderness in calf, back of calf or groin area. NOTE: blood clots can occur in either leg.   Signs of blood clots in lungs include - Sudden increased shortness of breath, sudden onset of chest pain, and localized chest pain with coughing.   If you experience any of the above symptoms, notify your  provider and seek medical attention immediately.   You received anticoagulant therapy (blood thinners) in the hospital. Continue the prescribed blood-thinning medication at home, as directed by your provider.   Your risk for developing a clot continues for up to 2-3 months after surgery. Avoid prolonged sitting and dehydration (long air trips and car trips). If you do take a trip during this time, please get up, move around every 1-1.5 hours, and discuss all travel plans with your provider.     Activity   Once you get home, stay active. The key is not to overdo it. While you can expect some good days and some bad days, you should notice a gradual improvement over time and a gradual increase in endurance over the next 6 to 12 months.     Weight Bearing - You can begin to bear weight as tolerated unless otherwise directed by your provider. Use a walker, crutches or cane to assist with walking until you can walk smoothly (minimal or no limp) without assistance.   Physical Precautions - To assure proper recovery and tissue healing, you may be asked to take special precautions when moving (sitting, bending or sleeping) - usually for the first 6 weeks after surgery. Precautions vary from patient to patient depending on surgical approach used by your provider. Follow any specific precautions provided by your provider and physical therapist until cleared by your provider.   Sitting - Early on it is easier to get up from a tall chair or a chair with arms. The physical therapist will show you how to sit and stand from a chair keeping your affected leg out in front of you. Get up and move around on a regular basis--at least once every hour.   Walking - Walk as much as you like once your doctor gives permission to proceed, but remember that walking is no substitute for your prescribed exercises.  Follow the home exercise program prescribed during your hospital stay as directed by your physical therapist or provider.   Continued  physical therapy may be prescribed after hospital discharge to strengthen your muscles and improve your gait/walking pattern. The decision to have therapy or not after the hospital stay is made by you and your provider prior to surgery or at your follow up appointment.   Swimming is an excellent low impact activity; you can begin as soon as the wound is healed and your provider clears you. Using a pair of training fins may make swimming a more enjoyable and effective exercise.   Sexual activity - Your provider can tell you when it is safe to resume sexual activity.   Other activities - Low impact activities are preferred. If you have specific questions, consult your provider.     When to Call the Doctor   Call the provider if you experience:   Fever over 100.5° F   Increased pain, drainage, redness, odor or heat around the incision area   Shaking chills   Increased knee pain with activity and rest   Increased pain in calf, tenderness or redness above or below the knee   Increased swelling of calf, ankle, foot   Sudden increased shortness of breath, sudden onset of chest pain, localized chest pain with coughing   Incision opening   Or, if there are any questions or concerns about medications or care.     Infection statistic resource:   https://www.[employer]/contents/prosthetic-joint-infection-epidemiology-microbiology-clinical-manifestations-and-diagnosis     -Is this patient being discharged with medication to prevent blood clots?  Yes, Aspirin   Aspirin, ASA oral tablets  What is this medicine?  ASPIRIN (AS pir in) is a pain reliever. It is used to treat mild pain and fever. This medicine is also used as directed by a doctor to prevent and to treat heart attacks, to prevent strokes and blood clots, and to treat arthritis or inflammation.  This medicine may be used for other purposes; ask your health care provider or pharmacist if you have questions.  COMMON BRAND NAME(S): Aspir-Low, Aspir-Malena, Aspirtab, Donna  Advanced Aspirin, Donna Aspirin, Donna Aspirin Extra Strength, Donna Aspirin Plus, Donna Extra Strength, Donna Extra Strength Plus, Donna Genuine Aspirin, Donna Womens Aspirin, Bufferin, Bufferin Extra Strength, Bufferin Low Dose  What should I tell my health care provider before I take this medicine?  They need to know if you have any of these conditions:  anemia  asthma  bleeding problems  child with chickenpox, the flu, or other viral infection  diabetes  gout  if you frequently drink alcohol containing drinks  kidney disease  liver disease  low level of vitamin K  lupus  smoke tobacco  stomach ulcers or other problems  an unusual or allergic reaction to aspirin, tartrazine dye, other medicines, dyes, or preservatives  pregnant or trying to get pregnant  breast-feeding  How should I use this medicine?  Take this medicine by mouth with a glass of water. Follow the directions on the package or prescription label. You can take this medicine with or without food. If it upsets your stomach, take it with food. Do not take your medicine more often than directed.  Talk to your pediatrician regarding the use of this medicine in children. While this drug may be prescribed for children as young as 12 years of age for selected conditions, precautions do apply. Children and teenagers should not use this medicine to treat chicken pox or flu symptoms unless directed by a doctor.  Patients over 65 years old may have a stronger reaction and need a smaller dose.  Overdosage: If you think you have taken too much of this medicine contact a poison control center or emergency room at once.  NOTE: This medicine is only for you. Do not share this medicine with others.  What if I miss a dose?  If you are taking this medicine on a regular schedule and miss a dose, take it as soon as you can. If it is almost time for your next dose, take only that dose. Do not take double or extra doses.  What may interact with this medicine?  Do not take  this medicine with any of the following medications:  cidofovir  ketorolac  probenecid  This medicine may also interact with the following medications:  alcohol  alendronate  bismuth subsalicylate  flavocoxid  herbal supplements like feverfew, garlic, yasemin, ginkgo biloba, horse chestnut  medicines for diabetes or glaucoma like acetazolamide, methazolamide  medicines for gout  medicines that treat or prevent blood clots like enoxaparin, heparin, ticlopidine, warfarin  other aspirin and aspirin-like medicines  NSAIDs, medicines for pain and inflammation, like ibuprofen or naproxen  pemetrexed  sulfinpyrazone  varicella live vaccine  This list may not describe all possible interactions. Give your health care provider a list of all the medicines, herbs, non-prescription drugs, or dietary supplements you use. Also tell them if you smoke, drink alcohol, or use illegal drugs. Some items may interact with your medicine.  What should I watch for while using this medicine?  If you are treating yourself for pain, tell your doctor or health care professional if the pain lasts more than 10 days, if it gets worse, or if there is a new or different kind of pain. Tell your doctor if you see redness or swelling. Also, check with your doctor if you have a fever that lasts for more than 3 days. Only take this medicine to prevent heart attacks or blood clotting if prescribed by your doctor or health care professional.  Do not take aspirin or aspirin-like medicines with this medicine. Too much aspirin can be dangerous. Always read the labels carefully.  This medicine can irritate your stomach or cause bleeding problems. Do not smoke cigarettes or drink alcohol while taking this medicine. Do not lie down for 30 minutes after taking this medicine to prevent irritation to your throat.  If you are scheduled for any medical or dental procedure, tell your healthcare provider that you are taking this medicine. You may need to stop taking  this medicine before the procedure.  This medicine may be used to treat migraines. If you take migraine medicines for 10 or more days a month, your migraines may get worse. Keep a diary of headache days and medicine use. Contact your healthcare professional if your migraine attacks occur more frequently.  What side effects may I notice from receiving this medicine?  Side effects that you should report to your doctor or health care professional as soon as possible:  allergic reactions like skin rash, itching or hives, swelling of the face, lips, or tongue  breathing problems  changes in hearing, ringing in the ears  confusion  general ill feeling or flu-like symptoms  pain on swallowing  redness, blistering, peeling or loosening of the skin, including inside the mouth or nose  signs and symptoms of bleeding such as bloody or black, tarry stools; red or dark-brown urine; spitting up blood or brown material that looks like coffee grounds; red spots on the skin; unusual bruising or bleeding from the eye, gums, or nose  trouble passing urine or change in the amount of urine  unusually weak or tired  yellowing of the eyes or skin  Side effects that usually do not require medical attention (report to your doctor or health care professional if they continue or are bothersome):  diarrhea or constipation  headache  nausea, vomiting  stomach gas, heartburn  This list may not describe all possible side effects. Call your doctor for medical advice about side effects. You may report side effects to FDA at 8-015-FDA-2660.  Where should I keep my medicine?  Keep out of the reach of children.  Store at room temperature between 15 and 30 degrees C (59 and 86 degrees F). Protect from heat and moisture. Do not use this medicine if it has a strong vinegar smell. Throw away any unused medicine after the expiration date.  NOTE: This sheet is a summary. It may not cover all possible information. If you have questions about this medicine,  talk to your doctor, pharmacist, or health care provider.  © 2020 [employer]/Gold Standard (2018-01-30 10:42:13)    Is patient discharged on Warfarin / Coumadin?   No     Depression / Suicide Risk    As you are discharged from this [facility] facility, it is important to learn how to keep safe from harming yourself.    Recognize the warning signs:  Abrupt changes in personality, positive or negative- including increase in energy   Giving away possessions  Change in eating patterns- significant weight changes-  positive or negative  Change in sleeping patterns- unable to sleep or sleeping all the time   Unwillingness or inability to communicate  Depression  Unusual sadness, discouragement and loneliness  Talk of wanting to die  Neglect of personal appearance   Rebelliousness- reckless behavior  Withdrawal from people/activities they love  Confusion- inability to concentrate     If you or a loved one observes any of these behaviors or has concerns about self-harm, here's what you can do:  Talk about it- your feelings and reasons for harming yourself  Remove any means that you might use to hurt yourself (examples: pills, rope, extension cords, firearm)  Get professional help from the community (Mental Health, Substance Abuse, psychological counseling)  Do not be alone:Call your Safe Contact- someone whom you trust who will be there for you.  Call your local CRISIS HOTLINE 402-7419 or 178-134-9532  Call your local Children's Mobile Crisis Response Team [place] (108) 578-3455 or www.[employer]  Call the toll free National Suicide Prevention Hotlines   National Suicide Prevention Lifeline 061-201-HCMT (9580)  National Hope Line Network 800-SUICIDE (830-5916)

## 2022-05-19 NOTE — OR NURSING
0720 Patient allergies and NPO status verified, home medication reconciliation completed and belongings secured. Surgical site verified with patient. Patient verbalizes understanding of pain scale, expected course of stay and plan of care; patient and family state verbal understanding at this time. IV access established. Sequential in place as ordered.    In person COVID screening performed; negative.

## 2022-05-19 NOTE — OR NURSING
"0937: To PACU from OR via gurney, respirations spontaneous and non-labored. Icepack applied over c/d/i left hip surgical dressings. Pt resting, states pain is \"ok\", denies nausea.  0945: Pt complaining of increased pain, plan analgesia.  0950: Pt repetitive, needs some re-orientation, waiting for pain medication to be effective, denies nausea.  1005: Pt states pain is increasing and not improving, plan further analgesia.  1015: Pt states pain is not improved, plan analgesia.  1020: pt resting, still repetitive, re-orients easily.  1035: x-ray at bedside.  1038: Pt states pain had improved but now increasing after x-ray, plan further analgesia.  1050: pt states pain is improving, would like to wait before further analgesia. Tolerating ice chips and sips of water.  1105: Pt resting, states pain is tolerable. No change in surgical site assessment. Meets criteria to transfer to floor. Waiting for room assignment.   1130: Room assigned. Report called to Ioana JARQUIN. Pt updated on POC.  1153: pt transported to the floor with transport on 2L oxymask, tank at 3/4 full.  "

## 2022-05-19 NOTE — PROGRESS NOTES
4 Eyes Skin Assessment Completed by MILKA Triplett and MILKA Acharya.    Head WDL  Ears WDL  Nose WDL  Mouth WDL  Neck WDL  Breast/Chest WDL  Shoulder Blades WDL  Spine WDL  (R) Arm/Elbow/Hand WDL  (L) Arm/Elbow/Hand WDL  Abdomen WDL  Groin WDL  Scrotum/Coccyx/Buttocks WDL  (R) Leg WDL  (L) Leg Incision post left hip arthroplasty  (R) Heel/Foot/Toe WDL  (L) Heel/Foot/Toe WDL          Devices In Places Blood Pressure Cuff, Pulse Ox and SCD's      Interventions In Place Pillows and Pressure Redistribution Mattress    Possible Skin Injury No    Pictures Uploaded Into Epic N/A  Wound Consult Placed N/A  RN Wound Prevention Protocol Ordered No

## 2022-05-19 NOTE — OP REPORT
DATE OF SERVICE:  05/19/2022     PREOPERATIVE DIAGNOSIS:  Left hip end-stage osteoarthritis.     POSTOPERATIVE DIAGNOSIS:  Left hip end-stage osteoarthritis.     PROCEDURE PERFORMED:  Left total hip arthroplasty.     SURGEON:  Arden Hamlin MD     ANESTHESIOLOGIST:  Yunior Kimbrough MD     ANESTHESIA:  General.     ASSISTANT:  Keira Reyes PA-C     ESTIMATED BLOOD LOSS:  300 mL.     IMPLANTS:  Adriana press-fit total hip arthroplasty with following components:  1.  A 50 mm Trident II acetabular shell.  2.  MDM acetabular liner.  3.  Accolade II size 6 standard neck angle femoral stem.  4.  A +0 mm x 22 mm inner diameter, 38 mm outer diameter MDM femoral head.  5.  A 6.5x45 mm supraacetabular screw.     INDICATIONS FOR PROCEDURE:  The patient is a 56-year-old female.  I have seen   her for several months now and she has essentially failed nonoperative   management for left hip osteoarthritis and at this point is felt to be a   candidate for hip arthroplasty.  After discussing risks, benefits and   alternatives of surgery, she signed informed consent and wished to proceed   with hip arthroplasty as outlined above.     DESCRIPTION OF PROCEDURE:  The patient was met in the preoperative holding   area.  Her surgical site was signed.  Her consent was confirmed to be   accurate.  She was taken back to the operating room and general anesthesia was   induced.  Ancef was administered as well as tranexamic acid by Dr. Kimbrough.    She was positioned in the right lateral decubitus position with an axillary   roll and Stulberg positioners.  The left lower extremity was provisionally   cleansed with isopropyl alcohol and then prepped and draped in the usual   sterile fashion.  A formal timeout was performed to confirm patient's correct   name, correct surgical site, correct procedure and correct laterality.  A   posterior approach to the hip was then performed with a scalpel down through   skin.  Dissection was carried  with Bovie cautery through subcutaneous tissue   down to the iliotibial band and gluteal fascia, which was split   longitudinally.  Charnley retractor was placed underneath the gluteus amos   muscle, identified the piriformis tendon and developed a plane in between   piriformis and gluteus minimus and released the short external rotators off   the proximal femur with a full-thickness sleeve with posterior capsule and was   able to dislocate the hip.  I used the oscillating saw to resect the excess   femoral neck and placed anterior and inferior acetabular retractors, cleared   the acetabular rim and soft tissue as well as the cotyloid fossa.  I then   reamed medializing the acetabulum, reaming down to very close cotyloid   baseplate and then expanded ultimately up to a 50 mm reamer, which I felt I   had good bleeding subchondral bone and I was hesitant to ream up 2 more   millimeters given the size of the acetabulum, despite the fact that I could   get a larger MDM and ceramic head in place, I felt that the 50 mm shell was   appropriate size.  I thoroughly irrigated out the acetabulum and inserted and   impacted the 50 mm Trident II Acetabular shell adding about 5 degrees of   anteversion compared to native anteversion confirming that the anterior   portion of the shell was just below the anterior rim of the acetabulum.  I   then placed a single 45 mm superior acetabular screw, which had excellent bony   purchase.  I then cleansed the shell and inserted the MDM acetabular liner   impacted into place, confirmed it was well seated.  I then turned my attention   to the femur with appropriate retractors in place, I started preparing for   the femur with a box osteotome followed by canal finding reamer followed by   sequential broaching.  Ultimately up to a size 6 Accolade II femoral stem,   which had good medial lateral fill of the canal and I trialled and felt she   had excellent stability parameters and clinically  symmetric limb lengths.  I   removed the trial components, thoroughly irrigated out the femoral canal,   inserted a size 6, 132-degree Accolade II femoral stem to the appropriate   depth.  It was well seated.  I confirmed there was no iatrogenic calcar   fracture under direct visualization.  I then trialed and ultimately selected a   +0 mm neck length.  I cleansed and dried the trunnion and inserted the   component femoral head and impacted it gently and reduced the hip.  The wound   was then soaked in dilute Betadine solution for 3 minutes.  I injected a total   of 60 mL of 0.25% Marcaine with epinephrine in the posterior capsule, soft   tissues and subcutaneous tissue for postop analgesia and placed vancomycin   powder 1 g within the hip joint and reapproximated the short external rotators   and posterior capsule through bone tunnels and greater trochanter with #5   Ti-Cron.  I repaired the IT band and gluteal fascia with size 2 Stratafix   suture, subcutaneous layers with 0 Vicryl, 2-0 Vicryl and the skin edges with   staples.  The wounds were thoroughly cleansed, dried and sterile silver   impregnated Aquacel dressing was applied.  She was transferred in supine   position, awoken from anesthesia, transferred on the [facility] and taken to   postanesthesia care unit in stable condition.     PLAN:  1.  The patient will be admitted for observation postoperatively.  2.  She should work with physical and occupational therapy postop as soon as   possible.  3.  She can be weightbearing as tolerated to left lower extremity, but should   maintain posterior hip precautions at all times.  4.  She will need Ancef for 2 doses postop for infection prophylaxis.  5.  She can be discharged to home later today if she is cleared by physical   and occupational therapy and if she is otherwise doing well.  Otherwise, I   anticipate her discharge home tomorrow.  6.  She should follow up with me in 2 weeks postop for routine wound check  and   staple removal as scheduled.    Keira Reyes PA-C was present and essential for the duration of the procedure.   Required assistance included positioning, draping, retracting, and wound closure.       ______________________________  MD CATHERINE Christianson/SHANNA    DD:  05/19/2022 09:51  DT:  05/19/2022 11:07    Job#:  [id number]

## 2022-05-19 NOTE — DISCHARGE PLANNING
Anticipated Discharge Disposition: Home with DME walker    Action: LSW notified that pt will need a walker for home. Pt has walker order. Pt has insurance that contracts with [employer]. LSW faxed choice form for [facility] to [facility] for continuity of care. LSW notified RN to get walker from storage.    Barriers to Discharge: None    Plan: LSW to follow and assist as needed.

## 2022-05-19 NOTE — PROGRESS NOTES
Patient arrived from PACU admitted to room-215-. Pt is alert and oriented x 4, pain is 7/10 on her left hip. Patient given pain medication prir to admission to G[facility], dressing CDI, dorsiflex and plantar flex both feet, cap refill less than 3 seconds, palpable pulses BLE, denies N/V, unlabored breathing, denies SOB, SCD's and polar ice  in use, oriented to use of call light and importance of calling for assistance, bed in lowest position, call light within reach, updated on plan of care.    Patient needs a walker for home.  was informed for choice form.

## 2022-05-19 NOTE — ANESTHESIA TIME REPORT
Anesthesia Start and Stop Event Times     Date Time Event    5/19/2022 0721 Ready for Procedure     0733 Anesthesia Start     0939 Anesthesia Stop        Responsible Staff  05/19/22    Name Role Begin End    Yunior Kimbrough M.D. Anesth 0733 0934        Overtime Reason:  no overtime (within assigned shift)    Comments:

## 2022-05-19 NOTE — OR SURGEON
Immediate Post OP Note    PreOp Diagnosis: Left hip end stage osteoarthritis      PostOp Diagnosis: same      Procedure(s):  ARTHROPLASTY, HIP, TOTAL - ANY OTHER INDICATED PROCEDURES - Wound Class: Clean    Surgeon(s):  Arden Hamlin M.D.    Anesthesiologist/Type of Anesthesia:  Anesthesiologist: Yunior Kimbrough M.D./General    Surgical Staff:  Circulator: Sarkis Plunkett R.N.  Limb Cherry: Leigha Randhawa  Scrub Person: Maurizio Levine  First Assist: Keira Reyes P.A.-C.    Specimens removed if any:  * No specimens in log *    Estimated Blood Loss: 300cc    Findings: see dictation    Complications: none known    PLAN:  --admit obs vs. discharge to home later this afternoon  --PT/OT for mobilization ASAP  --WBAT LLE  --posterior hip precautions  --ancef x 2 doses postop if stays overnight, doxycycline x 1 week postop if d/c to home today  --fu 2 weeks postop        5/19/2022 9:31 AM Arden Hamlin M.D.

## 2022-05-19 NOTE — ANESTHESIA PROCEDURE NOTES
Airway    Date/Time: 5/19/2022 7:41 AM  Performed by: Yunior Kimbrough M.D.  Authorized by: Yunior Kimbrough M.D.     Location:  OR  Urgency:  Elective  Indications for Airway Management:  Anesthesia      Spontaneous Ventilation: absent    Sedation Level:  Deep  Preoxygenated: Yes    Patient Position:  Sniffing  Mask Difficulty Assessment:  1 - vent by mask  Final Airway Type:  Endotracheal airway  Final Endotracheal Airway:  ETT  Cuffed: Yes    Technique Used for Successful ETT Placement:  Direct laryngoscopy    Insertion Site:  Oral  Blade Type:  Pawan  Laryngoscope Blade/Videolaryngoscope Blade Size:  3  ETT Size (mm):  7.0  Measured from:  Teeth  ETT to Teeth (cm):  22  Placement Verified by: auscultation and capnometry    Cormack-Lehane Classification:  Grade IIa - partial view of glottis  Number of Attempts at Approach:  1

## 2022-05-19 NOTE — CARE PLAN
The patient is Stable - Low risk of patient condition declining or worsening    Shift Goals  Clinical Goals: patient will be able to walk to the bathroom to void and walk at least 50 feet.  Patient Goals: Discharge to home.    Progress made toward(s) clinical / shift goals:  Patient already walk to the bathroom to void and walk at least 50 feet with RN and PT.  PT said that patient is good to go home. OT is currently working       Problem: Pain - Standard  Goal: Alleviation of pain or a reduction in pain to the patient’s comfort goal  Outcome: Progressing     Problem: Knowledge Deficit - Standard  Goal: Patient and family/care givers will demonstrate understanding of plan of care, disease process/condition, diagnostic tests and medications  Outcome: Progressing

## 2022-05-19 NOTE — THERAPY
Physical Therapy   Initial Evaluation     Patient Name: Cindy Cruz  Age:  56 y.o., Sex:  female  Medical Record #: [id number]  Today's Date: 5/19/2022     Precautions  Precautions: (P) Weight Bearing As Tolerated Left Lower Extremity;Posterior Hip Precautions    Assessment  Patient is 56 y.o. female with a diagnosis of L THR post.Pt lives at home with  and is active.Pt is safe with bed mob,transfers ,ambulation and stairs.She understands HEP and precautions and needs a fww for home     Plan    Recommend Physical Therapy for Evaluation only    05/19/22 1600   Charge Group   PT Evaluation PT Evaluation Mod   Total Time Spent   PT Evaluation Time Spent (Mins) 40   Precautions   Precautions Weight Bearing As Tolerated Left Lower Extremity;Posterior Hip Precautions   Pain 0 - 10 Group   Therapist Pain Assessment 4   Prior Living Situation   Prior Services None   Housing / Facility 1 Story House   Steps Into Home 1   Steps In Home 0   Equipment Owned Front-Wheel Walker   Lives with - Patient's Self Care Capacity Spouse   Prior Level of Functional Mobility   Bed Mobility Independent   Transfer Status Independent   Ambulation Independent   Distance Ambulation (Feet)   (community amb)   Assistive Devices Used None   Stairs Independent   Cognition    Cognition / Consciousness WDL   Passive ROM Lower Body   Passive ROM Lower Body X   Active ROM Lower Body    Active ROM Lower Body  X   Strength Lower Body   Lower Body Strength  X   Coordination Lower Body    Coordination Lower Body  WDL   Balance Assessment   Sitting Balance (Static) Good   Sitting Balance (Dynamic) Good   Standing Balance (Static) Fair +   Standing Balance (Dynamic) Fair +   Weight Shift Sitting Good   Weight Shift Standing Good   Gait Analysis   Gait Level Of Assist Supervised   Assistive Device Front Wheel Walker   Distance (Feet) 100   # of Times Distance was Traveled 2   Deviation Step To;Antalgic   # of Stairs Climbed 1   Level of Assist  with Stairs Contact Guard Assist   Weight Bearing Status wbat L   Bed Mobility    Supine to Sit Minimal Assist   Sit to Supine Minimal Assist   Scooting Modified Independent   Functional Mobility   Sit to Stand Supervised   Bed, Chair, Wheelchair Transfer Supervised   Transfer Method Stand Step   How much difficulty does the patient currently have...   Turning over in bed (including adjusting bedclothes, sheets and blankets)? 3   Sitting down on and standing up from a chair with arms (e.g., wheelchair, bedside commode, etc.) 4   Moving from lying on back to sitting on the side of the bed? 3   How much help from another person does the patient currently need...   Moving to and from a bed to a chair (including a wheelchair)? 4   Need to walk in a hospital room? 4   Climbing 3-5 steps with a railing? 3   6 clicks Mobility Score 21   Activity Tolerance   Sitting Edge of Bed 10   Standing 10   Patient / Family Goals    Patient / Family Goal #1 home   Anticipated Discharge Equipment and Recommendations   DC Equipment Recommendations Front-Wheel Walker   Discharge Recommendations Recommend outpatient physical therapy services to address higher level deficits   Interdisciplinary Plan of Care Collaboration   IDT Collaboration with  Nursing   Session Information   Date / Session Number  5/19   Priority 0       DC Equipment Recommendations: (P) Front-Wheel Walker  Discharge Recommendations: (P) Recommend outpatient physical therapy services to address higher level deficits

## 2022-05-19 NOTE — ANESTHESIA PREPROCEDURE EVALUATION
Case: [id number] Date/Time: 05/19/22 0715    Procedure: ARTHROPLASTY, HIP, TOTAL - ANY OTHER INDICATED PROCEDURES (Left )    Pre-op diagnosis: OSTEOARTHRITIS OF LEFT HIP JOINT    Location:  OR  / SURGERY [facility]    Surgeons: Arden Hamlin M.D.          Relevant Problems   No relevant active problems       Physical Exam    Airway   Mallampati: III  TM distance: >3 FB  Neck ROM: full       Cardiovascular - normal exam  Rhythm: regular  Rate: normal  (-) murmur     Dental - normal exam           Pulmonary - normal exam  Breath sounds clear to auscultation     Abdominal   (+) obese     Neurological - normal exam               Anesthesia Plan    ASA 2       Plan - general       Airway plan will be ETT          Induction: intravenous    Postoperative Plan: Postoperative administration of opioids is intended.    Pertinent diagnostic labs and testing reviewed    Informed Consent:    Anesthetic plan and risks discussed with patient.    Use of blood products discussed with: patient whom consented to blood products.

## 2022-05-19 NOTE — DISCHARGE PLANNING
Received Choice form at 1222  Agency/Facility Name: [facility]  Referral sent per Choice form @ 3108

## 2022-05-19 NOTE — PROGRESS NOTES
56yoF with left hip osteoarthritis s/p QUINTIN today.    S: doing okay in PACU waking from anesthesia    O:    Vitals:    05/19/22 0620   BP: (!) 161/71   Pulse: 94   Resp: 20   Temp: 36.9 °C (98.4 °F)   SpO2: 95%     Exam:  General-NAD, alert and following commands  LLE-hip dressing c/d/i, NVI distally    A: 56yoF with left hip osteoarthritis s/p QUINTIN today.    Recs:  --admit obs vs. discharge to home later this afternoon  --PT/OT for mobilization ASAP  --WBAT LLE  --posterior hip precautions  --ancef x 2 doses postop if stays overnight, doxycycline x 1 week postop if d/c to home today  --fu 2 weeks postop

## 2022-05-19 NOTE — ANESTHESIA POSTPROCEDURE EVALUATION
Patient: Cindy Cruz    Procedure Summary     Date: 05/19/22 Room / Location:  OR  / SURGERY [facility]    Anesthesia Start: 0733 Anesthesia Stop: 0939    Procedure: ARTHROPLASTY, HIP, TOTAL - ANY OTHER INDICATED PROCEDURES (Left Hip) Diagnosis: (OSTEOARTHRITIS OF LEFT HIP JOINT)    Surgeons: Ardne Hamlin M.D. Responsible Provider: Yunior Kimbrough M.D.    Anesthesia Type: general ASA Status: 2          Final Anesthesia Type: general  Last vitals  BP   Blood Pressure: 113/58    Temp   37 °C (98.6 °F)    Pulse   (!) 107   Resp   17    SpO2   95 %      Anesthesia Post Evaluation    Patient location during evaluation: PACU  Patient participation: complete - patient participated  Level of consciousness: awake and alert  Pain score: 3    Airway patency: patent  Anesthetic complications: no  Cardiovascular status: hemodynamically stable  Respiratory status: acceptable  Hydration status: euvolemic    PONV: none          No complications documented.     Nurse Pain Score: 3 (NPRS)

## 2022-05-20 NOTE — THERAPY
"Occupational Therapy   Initial Evaluation     Patient Name: Cindy Cruz  Age:  56 y.o., Sex:  female  Medical Record #: [id number]  Today's Date: 5/19/2022     Precautions  Precautions: Posterior Hip Precautions, Weight Bearing As Tolerated Left Lower Extremity  Comments: s/p L QUINTIN    Assessment    Patient is 56 y.o. female with a diagnosis of severe L hip OA, now POD #0 L QUINTIN, posterior approach. Pt lives with supportive  and was I with all ADLs/IADLs, ambulates without AD and drives at PLOF. Her mother -- who was also present at time of eval -- will be providing assistance as well.     The following treatment was provided during OT session: Self care mgt/ADL training (17532) for detailed education re: posterior hip precautions, post op care, dressing techniques, use of adaptive equipment for LB ADLs, shower strategies with mgt of Aquacel dressing, use of appropriate DMEs/AD for home safety, and positioning techniques. Pt presents today with post op pain, though was able to tolerate most ADLs and functional txfs with FWW and AE use at spv level. She was able to return demonstrate good techniques during bed mob and ADL txfs, as well as good utilization of AE during LB dressing. Pt's home is lacking DMEs that can aid during recovery. She will benefit from a shower chair, and dressing kit in order to maximize safety and independence at home. Copy of Equipment Resource Guide provided to spouse, agreeable with recs. No acute OT needs at this time. Endorsed back to RN.    Plan    Recommend Occupational Therapy for evaluation and 1 treatment only.    DC Equipment Recommendations: Tub / Shower Seat (reacher)  Discharge Recommendations: Anticipate that the patient will have no further occupational therapy needs after discharge from the hospital     Subjective    \"I'm sleepy, but let's go and do it.\"     Objective       05/19/22 1701   Prior Living Situation   Prior Services None   Housing / Facility 1 Story " House   Steps Into Home 1   Steps In Home 0   Bathroom Set up Walk In Shower  (handheld shower)   Equipment Owned Hand Held Shower;Raised Toilet Seat Without Arms   Lives with - Patient's Self Care Capacity Spouse   Comments Pt lives with supportive spouse   Prior Level of ADL Function   Self Feeding Independent   Grooming / Hygiene Independent   Bathing Independent   Dressing Independent   Toileting Independent   Prior Level of IADL Function   Medication Management Independent   Laundry Independent   Kitchen Mobility Independent   Finances Independent   Home Management Independent   Shopping Independent   Prior Level Of Mobility Independent Without Device in Community;Independent Without Device in Home   Driving / Transportation Driving Independent   Occupation (Pre-Hospital Vocational) Not Employed   History of Falls   History of Falls No   Precautions   Precautions Posterior Hip Precautions;Weight Bearing As Tolerated Left Lower Extremity   Comments s/p L QUINTIN   Pain   Pain Scales 0 to 10 Scale    Intervention Cold Pack;Rest   Pain 0 - 10 Group   Location Hip   Location Orientation Left   Pain Rating Scale (NPRS) 2   Description Dull   Comfort Goal 4;Comfort with Movement;Perform Activity;Sleep Comfortably   Therapist Pain Assessment 6;During Activity;Post Activity Pain Same as Prior to Activity;Nurse Notified   Cognition    Cognition / Consciousness WDL   Comments A+Ox4, pleasant and cooperative   Active ROM Upper Body   Active ROM Upper Body  WDL   Dominant Hand Right   Strength Upper Body   Upper Body Strength  WDL   Balance Assessment   Sitting Balance (Static) Good   Sitting Balance (Dynamic) Good   Standing Balance (Static) Fair +   Standing Balance (Dynamic) Fair +   Weight Shift Sitting Good   Weight Shift Standing Good   Bed Mobility    Supine to Sit Minimal Assist   Sit to Supine Minimal Assist   Scooting Supervised   Comments HOB flat, rails not utilized   ADL Assessment   Grooming Seated;Supervision    Upper Body Dressing Supervision   Lower Body Dressing Supervision  (with AE)   How much help from another person does the patient currently need...   Putting on and taking off regular lower body clothing? 3   Bathing (including washing, rinsing, and drying)? 2   Toileting, which includes using a toilet, bedpan, or urinal? 3   Putting on and taking off regular upper body clothing? 3   Taking care of personal grooming such as brushing teeth? 3   Eating meals? 4   6 Clicks Daily Activity Score 18   Functional Mobility   Sit to Stand Supervised   Bed, Chair, Wheelchair Transfer Supervised   Transfer Method Stand Step   Mobility with FWW   Activity Tolerance   Sitting in Chair NT   Sitting Edge of Bed 10 mins   Standing 5 mins   Education Group   Education Provided Role of Occupational Therapist;Hip Precautions;Home Safety;Transfers;Activities of Daily Living;Adaptive Equipment   Role of Occupational Therapist Patient Response Patient;Family;Significant Other;Explanation;Acceptance   Hip Precautions Patient Response Patient;Family;Significant Other;Explanation;Acceptance;Handout;Demonstration;Verbal Demonstration   Home Safety Patient Response Patient;Family;Significant Other;Explanation;Acceptance;Verbal Demonstration   Transfers Patient Response Patient;Family;Significant Other;Explanation;Acceptance;Demonstration;Handout;Verbal Demonstration   ADL Patient Response Patient;Family;Significant Other;Explanation;Acceptance;Verbal Demonstration;Action Demonstration;Demonstration   Adaptive Equipment Patient Response Patient;Family;Significant Other;Explanation;Demonstration;Handout;Acceptance;Verbal Demonstration;Action Demonstration   Anticipated Discharge Equipment and Recommendations   DC Equipment Recommendations Tub / Shower Seat  (reacher)   Discharge Recommendations Anticipate that the patient will have no further occupational therapy needs after discharge from the hospital   Interdisciplinary Plan of Care  Collaboration   IDT Collaboration with  Nursing   Patient Position at End of Therapy In Bed;Call Light within Reach;Tray Table within Reach;Family / Friend in Room;Phone within Reach   Collaboration Comments RN aware of OT session and recs   Session Information   Date / Session Number  5/19 #1 (one time only)   Priority 0

## 2022-08-06 ENCOUNTER — APPOINTMENT (OUTPATIENT)
Dept: RADIOLOGY | Facility: MEDICAL CENTER | Age: 57
End: 2022-08-06
Attending: EMERGENCY MEDICINE
Payer: COMMERCIAL

## 2022-08-06 ENCOUNTER — HOSPITAL ENCOUNTER (EMERGENCY)
Facility: MEDICAL CENTER | Age: 57
End: 2022-08-06
Attending: EMERGENCY MEDICINE
Payer: COMMERCIAL

## 2022-08-06 VITALS
TEMPERATURE: 98.1 F | HEART RATE: 92 BPM | SYSTOLIC BLOOD PRESSURE: 116 MMHG | BODY MASS INDEX: 35.7 KG/M2 | WEIGHT: 194 LBS | HEIGHT: 62 IN | RESPIRATION RATE: 16 BRPM | DIASTOLIC BLOOD PRESSURE: 75 MMHG | OXYGEN SATURATION: 94 %

## 2022-08-06 DIAGNOSIS — R07.9 CHEST PAIN, UNSPECIFIED TYPE: ICD-10-CM

## 2022-08-06 DIAGNOSIS — J45.21 INTERMITTENT ASTHMA WITH ACUTE EXACERBATION, UNSPECIFIED ASTHMA SEVERITY: ICD-10-CM

## 2022-08-06 DIAGNOSIS — R05.9 COUGH: ICD-10-CM

## 2022-08-06 LAB
ALBUMIN SERPL BCP-MCNC: 4.8 G/DL (ref 3.2–4.9)
ALBUMIN/GLOB SERPL: 1.6 G/DL
ALP SERPL-CCNC: 75 U/L (ref 30–99)
ALT SERPL-CCNC: 15 U/L (ref 2–50)
ANION GAP SERPL CALC-SCNC: 18 MMOL/L (ref 7–16)
AST SERPL-CCNC: 22 U/L (ref 12–45)
BASOPHILS # BLD AUTO: 1.1 % (ref 0–1.8)
BASOPHILS # BLD: 0.1 K/UL (ref 0–0.12)
BILIRUB SERPL-MCNC: 0.6 MG/DL (ref 0.1–1.5)
BUN SERPL-MCNC: 13 MG/DL (ref 8–22)
CALCIUM SERPL-MCNC: 9.7 MG/DL (ref 8.4–10.2)
CHLORIDE SERPL-SCNC: 98 MMOL/L (ref 96–112)
CO2 SERPL-SCNC: 22 MMOL/L (ref 20–33)
CREAT SERPL-MCNC: 0.63 MG/DL (ref 0.5–1.4)
D DIMER PPP IA.FEU-MCNC: 0.7 UG/ML (FEU) (ref 0–0.5)
EKG IMPRESSION: NORMAL
EOSINOPHIL # BLD AUTO: 0.11 K/UL (ref 0–0.51)
EOSINOPHIL NFR BLD: 1.2 % (ref 0–6.9)
ERYTHROCYTE [DISTWIDTH] IN BLOOD BY AUTOMATED COUNT: 45.2 FL (ref 35.9–50)
FLUAV RNA SPEC QL NAA+PROBE: NEGATIVE
FLUBV RNA SPEC QL NAA+PROBE: NEGATIVE
GFR SERPLBLD CREATININE-BSD FMLA CKD-EPI: 103 ML/MIN/1.73 M 2
GLOBULIN SER CALC-MCNC: 3 G/DL (ref 1.9–3.5)
GLUCOSE SERPL-MCNC: 130 MG/DL (ref 65–99)
HCT VFR BLD AUTO: 39.4 % (ref 37–47)
HGB BLD-MCNC: 12.6 G/DL (ref 12–16)
IMM GRANULOCYTES # BLD AUTO: 0.02 K/UL (ref 0–0.11)
IMM GRANULOCYTES NFR BLD AUTO: 0.2 % (ref 0–0.9)
LYMPHOCYTES # BLD AUTO: 1.63 K/UL (ref 1–4.8)
LYMPHOCYTES NFR BLD: 17.2 % (ref 22–41)
MCH RBC QN AUTO: 27.1 PG (ref 27–33)
MCHC RBC AUTO-ENTMCNC: 32 G/DL (ref 33.6–35)
MCV RBC AUTO: 84.7 FL (ref 81.4–97.8)
MONOCYTES # BLD AUTO: 0.74 K/UL (ref 0–0.85)
MONOCYTES NFR BLD AUTO: 7.8 % (ref 0–13.4)
NEUTROPHILS # BLD AUTO: 6.88 K/UL (ref 2–7.15)
NEUTROPHILS NFR BLD: 72.5 % (ref 44–72)
NRBC # BLD AUTO: 0 K/UL
NRBC BLD-RTO: 0 /100 WBC
PLATELET # BLD AUTO: 377 K/UL (ref 164–446)
PMV BLD AUTO: 10.9 FL (ref 9–12.9)
POTASSIUM SERPL-SCNC: 3.4 MMOL/L (ref 3.6–5.5)
PROCALCITONIN SERPL-MCNC: 0.09 NG/ML
PROT SERPL-MCNC: 7.8 G/DL (ref 6–8.2)
RBC # BLD AUTO: 4.65 M/UL (ref 4.2–5.4)
RSV RNA SPEC QL NAA+PROBE: NEGATIVE
SARS-COV-2 RNA RESP QL NAA+PROBE: NOTDETECTED
SODIUM SERPL-SCNC: 138 MMOL/L (ref 135–145)
SPECIMEN SOURCE: NORMAL
TROPONIN T SERPL-MCNC: 11 NG/L (ref 6–19)
WBC # BLD AUTO: 9.5 K/UL (ref 4.8–10.8)

## 2022-08-06 PROCEDURE — 85379 FIBRIN DEGRADATION QUANT: CPT

## 2022-08-06 PROCEDURE — C9803 HOPD COVID-19 SPEC COLLECT: HCPCS | Performed by: EMERGENCY MEDICINE

## 2022-08-06 PROCEDURE — 84145 PROCALCITONIN (PCT): CPT

## 2022-08-06 PROCEDURE — A9270 NON-COVERED ITEM OR SERVICE: HCPCS | Performed by: EMERGENCY MEDICINE

## 2022-08-06 PROCEDURE — 36415 COLL VENOUS BLD VENIPUNCTURE: CPT

## 2022-08-06 PROCEDURE — 0241U HCHG SARS-COV-2 COVID-19 NFCT DS RESP RNA 4 TRGT MIC: CPT

## 2022-08-06 PROCEDURE — 700117 HCHG RX CONTRAST REV CODE 255: Performed by: EMERGENCY MEDICINE

## 2022-08-06 PROCEDURE — 93005 ELECTROCARDIOGRAM TRACING: CPT | Performed by: EMERGENCY MEDICINE

## 2022-08-06 PROCEDURE — 700102 HCHG RX REV CODE 250 W/ 637 OVERRIDE(OP): Performed by: EMERGENCY MEDICINE

## 2022-08-06 PROCEDURE — 85025 COMPLETE CBC W/AUTO DIFF WBC: CPT

## 2022-08-06 PROCEDURE — 71275 CT ANGIOGRAPHY CHEST: CPT

## 2022-08-06 PROCEDURE — 71045 X-RAY EXAM CHEST 1 VIEW: CPT

## 2022-08-06 PROCEDURE — 99284 EMERGENCY DEPT VISIT MOD MDM: CPT

## 2022-08-06 PROCEDURE — 93005 ELECTROCARDIOGRAM TRACING: CPT

## 2022-08-06 PROCEDURE — 700111 HCHG RX REV CODE 636 W/ 250 OVERRIDE (IP): Performed by: EMERGENCY MEDICINE

## 2022-08-06 PROCEDURE — 80053 COMPREHEN METABOLIC PANEL: CPT

## 2022-08-06 PROCEDURE — 84484 ASSAY OF TROPONIN QUANT: CPT

## 2022-08-06 RX ORDER — PREDNISONE 10 MG/1
40 TABLET ORAL ONCE
Status: COMPLETED | OUTPATIENT
Start: 2022-08-06 | End: 2022-08-06

## 2022-08-06 RX ORDER — PREDNISONE 20 MG/1
40 TABLET ORAL DAILY
Qty: 10 TABLET | Refills: 0 | Status: SHIPPED | OUTPATIENT
Start: 2022-08-06 | End: 2022-08-11

## 2022-08-06 RX ORDER — ALBUTEROL SULFATE 90 UG/1
2 AEROSOL, METERED RESPIRATORY (INHALATION) EVERY 6 HOURS PRN
Qty: 8.5 G | Refills: 1 | Status: SHIPPED | OUTPATIENT
Start: 2022-08-06 | End: 2023-05-29

## 2022-08-06 RX ORDER — ALBUTEROL SULFATE 90 UG/1
2 AEROSOL, METERED RESPIRATORY (INHALATION) ONCE
Status: COMPLETED | OUTPATIENT
Start: 2022-08-06 | End: 2022-08-06

## 2022-08-06 RX ADMIN — PREDNISONE 40 MG: 10 TABLET ORAL at 12:26

## 2022-08-06 RX ADMIN — ALBUTEROL SULFATE 2 PUFF: 90 AEROSOL, METERED RESPIRATORY (INHALATION) at 11:32

## 2022-08-06 RX ADMIN — IOHEXOL 70 ML: 350 INJECTION, SOLUTION INTRAVENOUS at 11:49

## 2022-08-06 ASSESSMENT — ENCOUNTER SYMPTOMS
CHILLS: 0
COUGH: 1
DIZZINESS: 0
HEADACHES: 0
SHORTNESS OF BREATH: 1
VOMITING: 0
NAUSEA: 0
ABDOMINAL PAIN: 0
FEVER: 0

## 2022-08-06 NOTE — ED NOTES
Pt resting on gurney, pt in no acute distress, pt provided call light, instructed to call if needing any assistance, instructed not to get up by self, simone in lowest position.  , Shorty at bedside, Pt awaiting CT

## 2022-08-06 NOTE — ED PROVIDER NOTES
ED Provider Note    Primary care provider: Jie Starr D.O.  Means of arrival: private vehicle  History obtained from: patient  History limited by: none    CHIEF COMPLAINT  Chief Complaint   Patient presents with   • Chest Pain   • Cough       HPI  Cindy Cruz is a 57 y.o. female who presents to the Emergency Department for evaluation of chest pain and cough.  Patient reports for the past 4 days she has had a cough and has a discomfort with coughing in her left chest.  She initially presented to urgent care for evaluation of this and was advised to come to the emergency department for further evaluation.  Patient reports a history of asthma and has had some mild associated shortness of breath.  Denies any history of cardiac disease.  She denies leg swelling or leg pain.    REVIEW OF SYSTEMS  Review of Systems   Constitutional: Negative for chills and fever.   Respiratory: Positive for cough and shortness of breath.    Cardiovascular: Positive for chest pain.   Gastrointestinal: Negative for abdominal pain, nausea and vomiting.   Genitourinary: Negative for dysuria.   Neurological: Negative for dizziness and headaches.   All other systems reviewed and are negative.        PAST MEDICAL HISTORY   has a past medical history of Arthritis, Asthma, Hypertension, Pain (05/17/2022), and PKU (phenylketonuria) ([facility]).    SURGICAL HISTORY   has a past surgical history that includes total hip arthroplasty (Left, 5/19/2022).    SOCIAL HISTORY  Social History     Tobacco Use   • Smoking status: Never Smoker   • Smokeless tobacco: Never Used   Vaping Use   • Vaping Use: Never used   Substance Use Topics   • Alcohol use: Not Currently   • Drug use: Not Currently      Social History     Substance and Sexual Activity   Drug Use Not Currently       FAMILY HISTORY  History reviewed. No pertinent family history.    CURRENT MEDICATIONS  See medication list    ALLERGIES  Allergies   Allergen Reactions   • Food  "Anaphylaxis     Pumpkin seeds   • Keflex Palpitations   • Penicillins Myalgia     Burning sensation       PHYSICAL EXAM  VITAL SIGNS: BP (!) 139/90   Pulse 99   Temp 36.1 °C (97 °F) (Temporal)   Resp 20   Ht 1.575 m (5' 2\")   Wt 88 kg (194 lb 0.1 oz)   SpO2 97%   BMI 35.48 kg/m²   Vitals reviewed by myself.  Physical Exam  Nursing note and vitals reviewed.  Constitutional: Well-developed and well-nourished. No acute distress.   HENT: Head is normocephalic and atraumatic.  Eyes: extra-ocular movements intact  Cardiovascular: regular rate and  regular rhythm. No murmur heard.  Pulmonary/Chest: Diminished breath sounds throughout with scant expiratory wheezes  Abdominal: Soft and non-tender. No distention.    Musculoskeletal: Extremities exhibit normal range of motion without edema or tenderness.   Neurological: Awake and alert  Skin: Skin is warm and dry. No rash.         DIAGNOSTIC STUDIES /  LABS  Labs Reviewed   CBC WITH DIFFERENTIAL - Abnormal; Notable for the following components:       Result Value    MCHC 32.0 (*)     Neutrophils-Polys 72.50 (*)     Lymphocytes 17.20 (*)     All other components within normal limits   COMP METABOLIC PANEL - Abnormal; Notable for the following components:    Potassium 3.4 (*)     Anion Gap 18.0 (*)     Glucose 130 (*)     All other components within normal limits   D-DIMER - Abnormal; Notable for the following components:    D-Dimer Screen 0.70 (*)     All other components within normal limits   TROPONIN   COV-2, FLU A/B, AND RSV BY PCR (CEPHEID)   PROCALCITONIN   ESTIMATED GFR       All labs reviewed by me.    EKG Interpretation:  Interpreted by myself    12 Lead EKG interpreted by me to show:  EKG at 9:57 AM: Normal sinus rhythm, heart rate 94, normal axis, normal intervals, , QRS 92, QTc 466, no acute ST-T segment changes, no evidence of acute arrhythmia or ischemia  My impression of this EKG: Does not indicate ischemia or arrhythmia at this " time.    RADIOLOGY  CT-CTA CHEST PULMONARY ARTERY W/ RECONS   Final Result      1.  Negative for pulmonary embolism      2.  Hepatic steatosis and hepatomegaly      3.  Left renal calyceal cyst or less likely hydronephrosis            DX-CHEST-PORTABLE (1 VIEW)   Final Result      No acute cardiopulmonary abnormality identified.        The radiologist's interpretation of all radiological studies have been reviewed by me.          COURSE & MEDICAL DECISION MAKING  Nursing notes, VS, PMSFHx reviewed in chart.    Patient is a 57-year-old female who comes in for evaluation of cough and chest pain.  Differential diagnosis includes viral syndrome, upper respiratory infection, pneumonia, pulmonary embolism, arrhythmia, acute coronary syndrome.  Given her symptomatology I feel acute coronary syndrome is less likely, will obtain screening EKG and troponin.  We will also obtain screening labs and viral swabs.    Patient's initial vitals are within normal limits.  She is treated with albuterol she sounds tight and wheezy on exam.  Patient feels improved after treatment with albuterol.  EKG returns demonstrates no evidence of acute arrhythmia or ischemia.  Labs returned and troponin is within normal limits.  Patient symptoms have been ongoing for 4 days and seem more likely related to illness, cardiac etiology seems less likely.  She has a HEART score of 1 making her low risk for acute coronary syndrome.  Viral swabs are negative.  D-dimer returns and is slightly elevated therefore CT of the chest is done.  CT demonstrates no evidence of pulmonary embolism.  Therefore patient is reassured and advised on symptomatic management of likely viral syndrome with asthma exacerbation.  She will be started on 5-day course of prednisone and albuterol as needed.  She is amenable to this plan.  She is then given strict return precautions and discharged in stable condition.      FINAL IMPRESSION  1. Cough    2. Chest pain, unspecified type     3. Intermittent asthma with acute exacerbation, unspecified asthma severity

## 2022-08-06 NOTE — ED TRIAGE NOTES
"Pt is referred to our facility from Urgent Care for further evaluation, and management of central chest pain, abnormal EKG, and a productive cough recurring for the past 4 to 5 days.   Chief Complaint   Patient presents with   • Chest Pain   • Cough     BP (!) 139/90   Pulse 99   Temp 36.1 °C (97 °F) (Temporal)   Resp 20   Ht 1.575 m (5' 2\")   Wt 88 kg (194 lb 0.1 oz)   SpO2 97%   BMI 35.48 kg/m²    Has this patient been vaccinated for COVID YES  If not, would they like to be vaccinated while in the ER if eligible?  N/A  Would the patient like to speak with the ERP about the possibility of receiving the COVID vaccine today before making a decision? N/A     "

## 2022-10-29 ENCOUNTER — HOSPITAL ENCOUNTER (INPATIENT)
Facility: MEDICAL CENTER | Age: 57
LOS: 5 days | DRG: 378 | End: 2022-11-04
Attending: EMERGENCY MEDICINE | Admitting: HOSPITALIST
Payer: COMMERCIAL

## 2022-10-29 ENCOUNTER — APPOINTMENT (OUTPATIENT)
Dept: RADIOLOGY | Facility: MEDICAL CENTER | Age: 57
DRG: 378 | End: 2022-10-29
Attending: EMERGENCY MEDICINE
Payer: COMMERCIAL

## 2022-10-29 DIAGNOSIS — K92.2 GASTROINTESTINAL HEMORRHAGE, UNSPECIFIED GASTROINTESTINAL HEMORRHAGE TYPE: ICD-10-CM

## 2022-10-29 DIAGNOSIS — I95.1 ORTHOSTATIC SYNCOPE: ICD-10-CM

## 2022-10-29 DIAGNOSIS — K26.4 DUODENAL ULCER WITH HEMORRHAGE: ICD-10-CM

## 2022-10-29 LAB
BASOPHILS # BLD AUTO: 0.9 % (ref 0–1.8)
BASOPHILS # BLD: 0.12 K/UL (ref 0–0.12)
EKG IMPRESSION: NORMAL
EOSINOPHIL # BLD AUTO: 0.02 K/UL (ref 0–0.51)
EOSINOPHIL NFR BLD: 0.2 % (ref 0–6.9)
ERYTHROCYTE [DISTWIDTH] IN BLOOD BY AUTOMATED COUNT: 45.2 FL (ref 35.9–50)
HCT VFR BLD AUTO: 26 % (ref 37–47)
HGB BLD-MCNC: 8.4 G/DL (ref 12–16)
IMM GRANULOCYTES # BLD AUTO: 0.06 K/UL (ref 0–0.11)
IMM GRANULOCYTES NFR BLD AUTO: 0.5 % (ref 0–0.9)
LYMPHOCYTES # BLD AUTO: 1.52 K/UL (ref 1–4.8)
LYMPHOCYTES NFR BLD: 11.7 % (ref 22–41)
MCH RBC QN AUTO: 28.6 PG (ref 27–33)
MCHC RBC AUTO-ENTMCNC: 32.3 G/DL (ref 33.6–35)
MCV RBC AUTO: 88.4 FL (ref 81.4–97.8)
MONOCYTES # BLD AUTO: 0.42 K/UL (ref 0–0.85)
MONOCYTES NFR BLD AUTO: 3.2 % (ref 0–13.4)
NEUTROPHILS # BLD AUTO: 10.89 K/UL (ref 2–7.15)
NEUTROPHILS NFR BLD: 83.5 % (ref 44–72)
NRBC # BLD AUTO: 0 K/UL
NRBC BLD-RTO: 0 /100 WBC
PLATELET # BLD AUTO: 363 K/UL (ref 164–446)
PMV BLD AUTO: 11.5 FL (ref 9–12.9)
RBC # BLD AUTO: 2.94 M/UL (ref 4.2–5.4)
WBC # BLD AUTO: 13 K/UL (ref 4.8–10.8)

## 2022-10-29 PROCEDURE — 80053 COMPREHEN METABOLIC PANEL: CPT

## 2022-10-29 PROCEDURE — 36415 COLL VENOUS BLD VENIPUNCTURE: CPT

## 2022-10-29 PROCEDURE — 93005 ELECTROCARDIOGRAM TRACING: CPT | Performed by: EMERGENCY MEDICINE

## 2022-10-29 PROCEDURE — 99285 EMERGENCY DEPT VISIT HI MDM: CPT

## 2022-10-29 PROCEDURE — 93005 ELECTROCARDIOGRAM TRACING: CPT

## 2022-10-29 PROCEDURE — 82728 ASSAY OF FERRITIN: CPT

## 2022-10-29 PROCEDURE — 96375 TX/PRO/DX INJ NEW DRUG ADDON: CPT

## 2022-10-29 PROCEDURE — 82607 VITAMIN B-12: CPT

## 2022-10-29 PROCEDURE — 84484 ASSAY OF TROPONIN QUANT: CPT

## 2022-10-29 PROCEDURE — 85379 FIBRIN DEGRADATION QUANT: CPT

## 2022-10-29 PROCEDURE — 71045 X-RAY EXAM CHEST 1 VIEW: CPT

## 2022-10-29 PROCEDURE — 700105 HCHG RX REV CODE 258: Performed by: EMERGENCY MEDICINE

## 2022-10-29 PROCEDURE — 85025 COMPLETE CBC W/AUTO DIFF WBC: CPT

## 2022-10-29 PROCEDURE — 700111 HCHG RX REV CODE 636 W/ 250 OVERRIDE (IP): Performed by: EMERGENCY MEDICINE

## 2022-10-29 PROCEDURE — 83036 HEMOGLOBIN GLYCOSYLATED A1C: CPT

## 2022-10-29 PROCEDURE — 96374 THER/PROPH/DIAG INJ IV PUSH: CPT

## 2022-10-29 PROCEDURE — 83690 ASSAY OF LIPASE: CPT

## 2022-10-29 RX ORDER — METOCLOPRAMIDE HYDROCHLORIDE 5 MG/ML
10 INJECTION INTRAMUSCULAR; INTRAVENOUS ONCE
Status: COMPLETED | OUTPATIENT
Start: 2022-10-30 | End: 2022-10-29

## 2022-10-29 RX ORDER — KETOROLAC TROMETHAMINE 30 MG/ML
15 INJECTION, SOLUTION INTRAMUSCULAR; INTRAVENOUS ONCE
Status: COMPLETED | OUTPATIENT
Start: 2022-10-30 | End: 2022-10-29

## 2022-10-29 RX ORDER — SODIUM CHLORIDE, SODIUM LACTATE, POTASSIUM CHLORIDE, CALCIUM CHLORIDE 600; 310; 30; 20 MG/100ML; MG/100ML; MG/100ML; MG/100ML
1000 INJECTION, SOLUTION INTRAVENOUS ONCE
Status: COMPLETED | OUTPATIENT
Start: 2022-10-29 | End: 2022-10-30

## 2022-10-29 RX ADMIN — KETOROLAC TROMETHAMINE 15 MG: 30 INJECTION, SOLUTION INTRAMUSCULAR; INTRAVENOUS at 23:45

## 2022-10-29 RX ADMIN — METOCLOPRAMIDE 10 MG: 5 INJECTION, SOLUTION INTRAMUSCULAR; INTRAVENOUS at 23:41

## 2022-10-29 RX ADMIN — SODIUM CHLORIDE, POTASSIUM CHLORIDE, SODIUM LACTATE AND CALCIUM CHLORIDE 1000 ML: 600; 310; 30; 20 INJECTION, SOLUTION INTRAVENOUS at 23:46

## 2022-10-30 ENCOUNTER — APPOINTMENT (OUTPATIENT)
Dept: CARDIOLOGY | Facility: MEDICAL CENTER | Age: 57
DRG: 378 | End: 2022-10-30
Attending: HOSPITALIST
Payer: COMMERCIAL

## 2022-10-30 PROBLEM — R65.10 SIRS (SYSTEMIC INFLAMMATORY RESPONSE SYNDROME) (HCC): Status: ACTIVE | Noted: 2022-10-30

## 2022-10-30 PROBLEM — A41.9 SEPSIS SECONDARY TO UTI (HCC): Status: ACTIVE | Noted: 2022-10-30

## 2022-10-30 PROBLEM — N39.0 SEPSIS SECONDARY TO UTI (HCC): Status: ACTIVE | Noted: 2022-10-30

## 2022-10-30 PROBLEM — K92.1 HEMATOCHEZIA: Status: ACTIVE | Noted: 2022-10-30

## 2022-10-30 PROBLEM — D72.829 LEUKOCYTOSIS: Status: ACTIVE | Noted: 2022-10-30

## 2022-10-30 PROBLEM — R73.09 ELEVATED GLUCOSE: Status: ACTIVE | Noted: 2022-10-30

## 2022-10-30 PROBLEM — E78.5 HLD (HYPERLIPIDEMIA): Status: ACTIVE | Noted: 2022-10-30

## 2022-10-30 PROBLEM — D62 ANEMIA DUE TO ACUTE BLOOD LOSS: Status: ACTIVE | Noted: 2022-10-30

## 2022-10-30 PROBLEM — K92.2 GI BLEED: Status: ACTIVE | Noted: 2022-10-30

## 2022-10-30 PROBLEM — I10 HTN (HYPERTENSION): Status: ACTIVE | Noted: 2022-10-30

## 2022-10-30 PROBLEM — R55 SYNCOPE: Status: ACTIVE | Noted: 2022-10-30

## 2022-10-30 LAB
ABO GROUP BLD: NORMAL
ALBUMIN SERPL BCP-MCNC: 4.3 G/DL (ref 3.2–4.9)
ALBUMIN/GLOB SERPL: 1.9 G/DL
ALP SERPL-CCNC: 51 U/L (ref 30–99)
ALT SERPL-CCNC: 12 U/L (ref 2–50)
ANION GAP SERPL CALC-SCNC: 12 MMOL/L (ref 7–16)
APPEARANCE UR: CLEAR
AST SERPL-CCNC: 18 U/L (ref 12–45)
BACTERIA #/AREA URNS HPF: ABNORMAL /HPF
BARCODED ABORH UBTYP: 5100
BARCODED PRD CODE UBPRD: NORMAL
BARCODED UNIT NUM UBUNT: NORMAL
BILIRUB SERPL-MCNC: 0.4 MG/DL (ref 0.1–1.5)
BILIRUB UR QL STRIP.AUTO: NEGATIVE
BLD GP AB SCN SERPL QL: NORMAL
BUN SERPL-MCNC: 38 MG/DL (ref 8–22)
CALCIUM SERPL-MCNC: 9 MG/DL (ref 8.4–10.2)
CHLORIDE SERPL-SCNC: 102 MMOL/L (ref 96–112)
CO2 SERPL-SCNC: 23 MMOL/L (ref 20–33)
COLOR UR: YELLOW
COMPONENT R 8504R: NORMAL
CREAT SERPL-MCNC: 0.59 MG/DL (ref 0.5–1.4)
D DIMER PPP IA.FEU-MCNC: 0.36 UG/ML (FEU) (ref 0–0.5)
EPI CELLS #/AREA URNS HPF: ABNORMAL /HPF
EST. AVERAGE GLUCOSE BLD GHB EST-MCNC: 103 MG/DL
FERRITIN SERPL-MCNC: 44.2 NG/ML (ref 10–291)
GFR SERPLBLD CREATININE-BSD FMLA CKD-EPI: 105 ML/MIN/1.73 M 2
GLOBULIN SER CALC-MCNC: 2.3 G/DL (ref 1.9–3.5)
GLUCOSE SERPL-MCNC: 190 MG/DL (ref 65–99)
GLUCOSE UR STRIP.AUTO-MCNC: NEGATIVE MG/DL
HBA1C MFR BLD: 5.2 % (ref 4–5.6)
HCT VFR BLD AUTO: 20.5 % (ref 37–47)
HCT VFR BLD AUTO: 22.2 % (ref 37–47)
HCT VFR BLD AUTO: 23.6 % (ref 37–47)
HGB BLD-MCNC: 6.8 G/DL (ref 12–16)
HGB BLD-MCNC: 7.2 G/DL (ref 12–16)
HGB BLD-MCNC: 7.7 G/DL (ref 12–16)
IRON SATN MFR SERPL: 12 % (ref 15–55)
IRON SERPL-MCNC: 40 UG/DL (ref 40–170)
KETONES UR STRIP.AUTO-MCNC: ABNORMAL MG/DL
LEUKOCYTE ESTERASE UR QL STRIP.AUTO: ABNORMAL
LIPASE SERPL-CCNC: 48 U/L (ref 7–58)
LV EJECT FRACT MOD 2C 99903: 55.01
LV EJECT FRACT MOD 4C 99902: 82.43
LV EJECT FRACT MOD BP 99901: 72.69
MICRO URNS: ABNORMAL
MUCOUS THREADS #/AREA URNS HPF: ABNORMAL /HPF
NITRITE UR QL STRIP.AUTO: NEGATIVE
PH UR STRIP.AUTO: 5.5 [PH] (ref 5–8)
POTASSIUM SERPL-SCNC: 3.8 MMOL/L (ref 3.6–5.5)
PROCALCITONIN SERPL-MCNC: 0.12 NG/ML
PRODUCT TYPE UPROD: NORMAL
PROT SERPL-MCNC: 6.6 G/DL (ref 6–8.2)
PROT UR QL STRIP: NEGATIVE MG/DL
RBC # URNS HPF: ABNORMAL /HPF
RBC UR QL AUTO: ABNORMAL
RH BLD: NORMAL
SODIUM SERPL-SCNC: 137 MMOL/L (ref 135–145)
SP GR UR STRIP.AUTO: 1.02
TIBC SERPL-MCNC: 328 UG/DL (ref 250–450)
TROPONIN T SERPL-MCNC: 10 NG/L (ref 6–19)
TROPONIN T SERPL-MCNC: 9 NG/L (ref 6–19)
TROPONIN T SERPL-MCNC: 9 NG/L (ref 6–19)
UIBC SERPL-MCNC: 288 UG/DL (ref 110–370)
UNIT STATUS USTAT: NORMAL
VIT B12 SERPL-MCNC: 506 PG/ML (ref 211–911)
WBC #/AREA URNS HPF: ABNORMAL /HPF

## 2022-10-30 PROCEDURE — 83540 ASSAY OF IRON: CPT

## 2022-10-30 PROCEDURE — 700111 HCHG RX REV CODE 636 W/ 250 OVERRIDE (IP): Performed by: EMERGENCY MEDICINE

## 2022-10-30 PROCEDURE — 93306 TTE W/DOPPLER COMPLETE: CPT

## 2022-10-30 PROCEDURE — 96375 TX/PRO/DX INJ NEW DRUG ADDON: CPT

## 2022-10-30 PROCEDURE — 96365 THER/PROPH/DIAG IV INF INIT: CPT

## 2022-10-30 PROCEDURE — 700102 HCHG RX REV CODE 250 W/ 637 OVERRIDE(OP): Performed by: HOSPITALIST

## 2022-10-30 PROCEDURE — 700111 HCHG RX REV CODE 636 W/ 250 OVERRIDE (IP): Performed by: HOSPITALIST

## 2022-10-30 PROCEDURE — P9016 RBC LEUKOCYTES REDUCED: HCPCS

## 2022-10-30 PROCEDURE — 85014 HEMATOCRIT: CPT | Mod: 91

## 2022-10-30 PROCEDURE — 86850 RBC ANTIBODY SCREEN: CPT

## 2022-10-30 PROCEDURE — 700102 HCHG RX REV CODE 250 W/ 637 OVERRIDE(OP): Performed by: NURSE PRACTITIONER

## 2022-10-30 PROCEDURE — 93306 TTE W/DOPPLER COMPLETE: CPT | Mod: 26 | Performed by: INTERNAL MEDICINE

## 2022-10-30 PROCEDURE — 83550 IRON BINDING TEST: CPT

## 2022-10-30 PROCEDURE — 30233N1 TRANSFUSION OF NONAUTOLOGOUS RED BLOOD CELLS INTO PERIPHERAL VEIN, PERCUTANEOUS APPROACH: ICD-10-PCS | Performed by: FAMILY MEDICINE

## 2022-10-30 PROCEDURE — 86923 COMPATIBILITY TEST ELECTRIC: CPT

## 2022-10-30 PROCEDURE — C9113 INJ PANTOPRAZOLE SODIUM, VIA: HCPCS | Performed by: EMERGENCY MEDICINE

## 2022-10-30 PROCEDURE — 85018 HEMOGLOBIN: CPT | Mod: 91

## 2022-10-30 PROCEDURE — 36430 TRANSFUSION BLD/BLD COMPNT: CPT

## 2022-10-30 PROCEDURE — 770020 HCHG ROOM/CARE - TELE (206)

## 2022-10-30 PROCEDURE — A9270 NON-COVERED ITEM OR SERVICE: HCPCS | Performed by: NURSE PRACTITIONER

## 2022-10-30 PROCEDURE — 84145 PROCALCITONIN (PCT): CPT

## 2022-10-30 PROCEDURE — 84484 ASSAY OF TROPONIN QUANT: CPT | Mod: 91

## 2022-10-30 PROCEDURE — 86900 BLOOD TYPING SEROLOGIC ABO: CPT

## 2022-10-30 PROCEDURE — 96376 TX/PRO/DX INJ SAME DRUG ADON: CPT

## 2022-10-30 PROCEDURE — 700105 HCHG RX REV CODE 258: Performed by: FAMILY MEDICINE

## 2022-10-30 PROCEDURE — 86901 BLOOD TYPING SEROLOGIC RH(D): CPT

## 2022-10-30 PROCEDURE — A9270 NON-COVERED ITEM OR SERVICE: HCPCS | Performed by: HOSPITALIST

## 2022-10-30 PROCEDURE — C9113 INJ PANTOPRAZOLE SODIUM, VIA: HCPCS | Performed by: HOSPITALIST

## 2022-10-30 PROCEDURE — 36415 COLL VENOUS BLD VENIPUNCTURE: CPT

## 2022-10-30 PROCEDURE — 700105 HCHG RX REV CODE 258: Performed by: HOSPITALIST

## 2022-10-30 PROCEDURE — 81001 URINALYSIS AUTO W/SCOPE: CPT

## 2022-10-30 PROCEDURE — 99220 PR INITIAL OBSERVATION CARE,LEVL III: CPT | Performed by: HOSPITALIST

## 2022-10-30 RX ORDER — FENOFIBRATE 134 MG/1
134 CAPSULE ORAL
Status: DISCONTINUED | OUTPATIENT
Start: 2022-10-30 | End: 2022-11-04 | Stop reason: HOSPADM

## 2022-10-30 RX ORDER — PROMETHAZINE HYDROCHLORIDE 25 MG/1
12.5-25 TABLET ORAL EVERY 4 HOURS PRN
Status: DISCONTINUED | OUTPATIENT
Start: 2022-10-30 | End: 2022-11-04 | Stop reason: HOSPADM

## 2022-10-30 RX ORDER — SODIUM CHLORIDE, SODIUM LACTATE, POTASSIUM CHLORIDE, CALCIUM CHLORIDE 600; 310; 30; 20 MG/100ML; MG/100ML; MG/100ML; MG/100ML
INJECTION, SOLUTION INTRAVENOUS CONTINUOUS
Status: DISCONTINUED | OUTPATIENT
Start: 2022-10-30 | End: 2022-11-04 | Stop reason: HOSPADM

## 2022-10-30 RX ORDER — PROMETHAZINE HYDROCHLORIDE 25 MG/1
12.5-25 SUPPOSITORY RECTAL EVERY 4 HOURS PRN
Status: DISCONTINUED | OUTPATIENT
Start: 2022-10-30 | End: 2022-11-04 | Stop reason: HOSPADM

## 2022-10-30 RX ORDER — FERROUS SULFATE 325(65) MG
325 TABLET ORAL
Status: DISCONTINUED | OUTPATIENT
Start: 2022-10-31 | End: 2022-10-30

## 2022-10-30 RX ORDER — BECLOMETHASONE DIPROPIONATE HFA 40 UG/1
1 AEROSOL, METERED RESPIRATORY (INHALATION) 2 TIMES DAILY
COMMUNITY
End: 2023-05-29

## 2022-10-30 RX ORDER — LISINOPRIL 20 MG/1
20 TABLET ORAL
Status: DISCONTINUED | OUTPATIENT
Start: 2022-10-30 | End: 2022-11-04 | Stop reason: HOSPADM

## 2022-10-30 RX ORDER — AMOXICILLIN 250 MG
2 CAPSULE ORAL 2 TIMES DAILY
Status: DISCONTINUED | OUTPATIENT
Start: 2022-10-30 | End: 2022-11-04 | Stop reason: HOSPADM

## 2022-10-30 RX ORDER — PEG-3350, SODIUM SULFATE, SODIUM CHLORIDE, POTASSIUM CHLORIDE, SODIUM ASCORBATE AND ASCORBIC ACID 7.5-2.691G
100 KIT ORAL 2 TIMES DAILY
Status: DISPENSED | OUTPATIENT
Start: 2022-10-30 | End: 2022-10-31

## 2022-10-30 RX ORDER — BUDESONIDE AND FORMOTEROL FUMARATE DIHYDRATE 160; 4.5 UG/1; UG/1
2 AEROSOL RESPIRATORY (INHALATION) 2 TIMES DAILY
Status: DISCONTINUED | OUTPATIENT
Start: 2022-10-30 | End: 2022-10-30

## 2022-10-30 RX ORDER — ONDANSETRON 2 MG/ML
4 INJECTION INTRAMUSCULAR; INTRAVENOUS EVERY 4 HOURS PRN
Status: DISCONTINUED | OUTPATIENT
Start: 2022-10-30 | End: 2022-11-04 | Stop reason: HOSPADM

## 2022-10-30 RX ORDER — FLUTICASONE PROPIONATE 50 MCG
1 SPRAY, SUSPENSION (ML) NASAL 2 TIMES DAILY
Status: DISCONTINUED | OUTPATIENT
Start: 2022-10-30 | End: 2022-10-30

## 2022-10-30 RX ORDER — ACETAMINOPHEN 325 MG/1
650 TABLET ORAL EVERY 6 HOURS PRN
Status: DISCONTINUED | OUTPATIENT
Start: 2022-10-30 | End: 2022-11-04 | Stop reason: HOSPADM

## 2022-10-30 RX ORDER — ONDANSETRON 4 MG/1
4 TABLET, ORALLY DISINTEGRATING ORAL EVERY 4 HOURS PRN
Status: DISCONTINUED | OUTPATIENT
Start: 2022-10-30 | End: 2022-11-04 | Stop reason: HOSPADM

## 2022-10-30 RX ORDER — ROSUVASTATIN CALCIUM 10 MG/1
10 TABLET, COATED ORAL
Status: DISCONTINUED | OUTPATIENT
Start: 2022-10-30 | End: 2022-11-04 | Stop reason: HOSPADM

## 2022-10-30 RX ORDER — BISACODYL 10 MG
10 SUPPOSITORY, RECTAL RECTAL
Status: DISCONTINUED | OUTPATIENT
Start: 2022-10-30 | End: 2022-11-04 | Stop reason: HOSPADM

## 2022-10-30 RX ORDER — POLYETHYLENE GLYCOL 3350 17 G/17G
1 POWDER, FOR SOLUTION ORAL
Status: DISCONTINUED | OUTPATIENT
Start: 2022-10-30 | End: 2022-11-04 | Stop reason: HOSPADM

## 2022-10-30 RX ORDER — TRAZODONE HYDROCHLORIDE 50 MG/1
50 TABLET ORAL NIGHTLY
Status: DISCONTINUED | OUTPATIENT
Start: 2022-10-30 | End: 2022-11-04 | Stop reason: HOSPADM

## 2022-10-30 RX ORDER — FENOFIBRATE 160 MG/1
160 TABLET ORAL
Status: DISCONTINUED | OUTPATIENT
Start: 2022-10-30 | End: 2022-10-30

## 2022-10-30 RX ORDER — FERROUS SULFATE 325(65) MG
325 TABLET ORAL
Status: DISCONTINUED | OUTPATIENT
Start: 2022-10-30 | End: 2022-11-02

## 2022-10-30 RX ORDER — PANTOPRAZOLE SODIUM 40 MG/10ML
40 INJECTION, POWDER, LYOPHILIZED, FOR SOLUTION INTRAVENOUS 2 TIMES DAILY
Status: DISCONTINUED | OUTPATIENT
Start: 2022-10-30 | End: 2022-11-03

## 2022-10-30 RX ORDER — HYDROCHLOROTHIAZIDE 12.5 MG/1
12.5 TABLET ORAL
Status: DISCONTINUED | OUTPATIENT
Start: 2022-10-30 | End: 2022-11-04 | Stop reason: HOSPADM

## 2022-10-30 RX ORDER — PANTOPRAZOLE SODIUM 40 MG/10ML
40 INJECTION, POWDER, LYOPHILIZED, FOR SOLUTION INTRAVENOUS ONCE
Status: COMPLETED | OUTPATIENT
Start: 2022-10-30 | End: 2022-10-30

## 2022-10-30 RX ORDER — CITALOPRAM 20 MG/1
10 TABLET ORAL DAILY
Status: DISCONTINUED | OUTPATIENT
Start: 2022-10-30 | End: 2022-11-04 | Stop reason: HOSPADM

## 2022-10-30 RX ORDER — DOXYCYCLINE HYCLATE 100 MG
100 TABLET ORAL 2 TIMES DAILY
COMMUNITY
End: 2023-05-29

## 2022-10-30 RX ORDER — PROCHLORPERAZINE EDISYLATE 5 MG/ML
5-10 INJECTION INTRAMUSCULAR; INTRAVENOUS EVERY 4 HOURS PRN
Status: DISCONTINUED | OUTPATIENT
Start: 2022-10-30 | End: 2022-11-04 | Stop reason: HOSPADM

## 2022-10-30 RX ADMIN — LISINOPRIL 20 MG: 20 TABLET ORAL at 06:09

## 2022-10-30 RX ADMIN — CEFTRIAXONE SODIUM 1000 MG: 1 INJECTION, POWDER, FOR SOLUTION INTRAMUSCULAR; INTRAVENOUS at 06:16

## 2022-10-30 RX ADMIN — HYDROCHLOROTHIAZIDE 12.5 MG: 12.5 TABLET ORAL at 06:09

## 2022-10-30 RX ADMIN — POLYETHYLENE GLYCOL 3350, SODIUM SULFATE, SODIUM CHLORIDE, POTASSIUM CHLORIDE, ASCORBIC ACID, SODIUM ASCORBATE 100 G: KIT at 17:50

## 2022-10-30 RX ADMIN — PANTOPRAZOLE SODIUM 40 MG: 40 INJECTION, POWDER, FOR SOLUTION INTRAVENOUS at 17:51

## 2022-10-30 RX ADMIN — TRAZODONE HYDROCHLORIDE 50 MG: 50 TABLET ORAL at 20:59

## 2022-10-30 RX ADMIN — SODIUM CHLORIDE, POTASSIUM CHLORIDE, SODIUM LACTATE AND CALCIUM CHLORIDE: 600; 310; 30; 20 INJECTION, SOLUTION INTRAVENOUS at 07:58

## 2022-10-30 RX ADMIN — FENOFIBRATE 134 MG: 134 CAPSULE ORAL at 20:59

## 2022-10-30 RX ADMIN — PANTOPRAZOLE SODIUM 40 MG: 40 INJECTION, POWDER, FOR SOLUTION INTRAVENOUS at 06:09

## 2022-10-30 RX ADMIN — ACETAMINOPHEN 650 MG: 325 TABLET, FILM COATED ORAL at 17:48

## 2022-10-30 RX ADMIN — PANTOPRAZOLE SODIUM 40 MG: 40 INJECTION, POWDER, FOR SOLUTION INTRAVENOUS at 01:14

## 2022-10-30 RX ADMIN — SODIUM CHLORIDE, POTASSIUM CHLORIDE, SODIUM LACTATE AND CALCIUM CHLORIDE: 600; 310; 30; 20 INJECTION, SOLUTION INTRAVENOUS at 17:57

## 2022-10-30 RX ADMIN — ROSUVASTATIN 10 MG: 10 TABLET, FILM COATED ORAL at 20:59

## 2022-10-30 RX ADMIN — SENNOSIDES AND DOCUSATE SODIUM 2 TABLET: 50; 8.6 TABLET ORAL at 17:48

## 2022-10-30 RX ADMIN — CITALOPRAM HYDROBROMIDE 10 MG: 20 TABLET ORAL at 06:09

## 2022-10-30 ASSESSMENT — ENCOUNTER SYMPTOMS
CHILLS: 0
DEPRESSION: 0
NECK PAIN: 0
COUGH: 0
DIZZINESS: 0
BRUISES/BLEEDS EASILY: 0
EYE PAIN: 0
HEMOPTYSIS: 0
INSOMNIA: 0
HEADACHES: 1
DIARRHEA: 1
WEAKNESS: 1
MYALGIAS: 0
FALLS: 1
SORE THROAT: 0
LOSS OF CONSCIOUSNESS: 1
POLYDIPSIA: 0
HEADACHES: 0
SINUS PAIN: 0
EYE DISCHARGE: 0
MEMORY LOSS: 0
ABDOMINAL PAIN: 0
PALPITATIONS: 0
VOMITING: 0
VOMITING: 1
ORTHOPNEA: 0
NAUSEA: 1
WHEEZING: 0
WEAKNESS: 0
NERVOUS/ANXIOUS: 0
BLOOD IN STOOL: 1
SPUTUM PRODUCTION: 1
BLURRED VISION: 0
FLANK PAIN: 0
SHORTNESS OF BREATH: 0
FEVER: 0
DOUBLE VISION: 0
NAUSEA: 0

## 2022-10-30 ASSESSMENT — COGNITIVE AND FUNCTIONAL STATUS - GENERAL
MOVING FROM LYING ON BACK TO SITTING ON SIDE OF FLAT BED: A LITTLE
CLIMB 3 TO 5 STEPS WITH RAILING: A LITTLE
SUGGESTED CMS G CODE MODIFIER DAILY ACTIVITY: CH
SUGGESTED CMS G CODE MODIFIER MOBILITY: CJ
WALKING IN HOSPITAL ROOM: A LITTLE
DAILY ACTIVITIY SCORE: 24
MOBILITY SCORE: 21

## 2022-10-30 ASSESSMENT — FIBROSIS 4 INDEX
FIB4 SCORE: 0.82

## 2022-10-30 ASSESSMENT — PATIENT HEALTH QUESTIONNAIRE - PHQ9
1. LITTLE INTEREST OR PLEASURE IN DOING THINGS: NOT AT ALL
SUM OF ALL RESPONSES TO PHQ9 QUESTIONS 1 AND 2: 0
2. FEELING DOWN, DEPRESSED, IRRITABLE, OR HOPELESS: NOT AT ALL

## 2022-10-30 ASSESSMENT — LIFESTYLE VARIABLES
HAVE YOU EVER FELT YOU SHOULD CUT DOWN ON YOUR DRINKING: NO
TOTAL SCORE: 0
ON A TYPICAL DAY WHEN YOU DRINK ALCOHOL HOW MANY DRINKS DO YOU HAVE: 0
TOTAL SCORE: 0
ALCOHOL_USE: NO
HAVE PEOPLE ANNOYED YOU BY CRITICIZING YOUR DRINKING: NO
AVERAGE NUMBER OF DAYS PER WEEK YOU HAVE A DRINK CONTAINING ALCOHOL: 0
EVER HAD A DRINK FIRST THING IN THE MORNING TO STEADY YOUR NERVES TO GET RID OF A HANGOVER: NO
TOTAL SCORE: 0
EVER FELT BAD OR GUILTY ABOUT YOUR DRINKING: NO
HOW MANY TIMES IN THE PAST YEAR HAVE YOU HAD 5 OR MORE DRINKS IN A DAY: 0
CONSUMPTION TOTAL: NEGATIVE

## 2022-10-30 NOTE — ASSESSMENT & PLAN NOTE
- Both BRB and melena at home PTA, has resolved  - First EGD with evidence of bleeding from a duodenal ulcer - epi and clipped, continue on PPI, Repeat EGD today.  - Oral iron  - s/p 3u PRBC   - Pt takes excedrin at home, advised her to take only Tylenol as needed

## 2022-10-30 NOTE — PROGRESS NOTES
0850 Assessment complete, no complaints of pain or dizziness. Echo previously completed. Discussed POC with MD and  at bedside. Safety measures in place.

## 2022-10-30 NOTE — ASSESSMENT & PLAN NOTE
-She takes lisinopril and hydrochlorothiazide.   - Hold home antihypertensives for now given her hypotension PTA and normotension here

## 2022-10-30 NOTE — ASSESSMENT & PLAN NOTE
-Under the context of hematochezia and melena  - s/p 3u PRBC since admission, H/H is not increasing  - HGB 6.9 this am  - transfuse another unit  - CTA abdomen/pelvis unremarkable for source of bleeding  - Patient to return to EGD today  - IV iron replacement given significant iron deficiency

## 2022-10-30 NOTE — PROGRESS NOTES
Hospital Medicine Daily Progress Note    Date of Service  10/30/2022    Chief Complaint  Cindy Cruz is a 57 y.o. female admitted 10/29/2022 with syncope    Hospital Course  This is a 58yo female with a history of HTN, HLD, asthma, hip arthritis s/p QUINTIN earlier this year, phenylketonuria and obesity. She presented to hospital with syncope after having vomiting an diarrhea with initiation of doxycycline for a sinus infection. She does report having hematochezia, and was heme positive in the ER.    Interval Problem Update  10/30 - Pt states no blood in her stools since Friday, but hemoglobin continues to decline here, getting 1u PRBC for Hgb 7.2 with tachycardia today.  Denies ever having colonoscopy, states she may have had an EGD in the past, not in [place]. Contacted Dr Jerry and Rakesh, pt can have CLD today and likely scoping tomorrow. Of note, pt denies any urinary symptoms, will stop Rocephin.     I have discussed this patient's plan of care and discharge plan at IDT rounds today with Case Management, Nursing, Nursing leadership, and other members of the IDT team.    Consultants/Specialty  GI    Code Status  Full Code    Disposition  Patient is not medically cleared for discharge.   Anticipate discharge to to home with close outpatient follow-up.  I have placed the appropriate orders for post-discharge needs.    Review of Systems  Review of Systems   Constitutional:  Negative for chills and fever.   Respiratory:  Negative for cough and shortness of breath.    Cardiovascular:  Negative for chest pain and leg swelling.   Gastrointestinal:  Positive for blood in stool, diarrhea and vomiting. Negative for abdominal pain and nausea.   Genitourinary:  Negative for dysuria, frequency and urgency.   All other systems reviewed and are negative.     Physical Exam  Temp:  [36.1 °C (96.9 °F)-37.3 °C (99.1 °F)] 37.3 °C (99.1 °F)  Pulse:  [103-119] 104  Resp:  [18-20] 18  BP: (108-148)/(56-79) 118/67  SpO2:  [95  %-98 %] 98 %    Physical Exam  Vitals and nursing note reviewed.   Constitutional:       Appearance: Normal appearance. She is not diaphoretic.   HENT:      Mouth/Throat:      Mouth: Mucous membranes are moist.   Cardiovascular:      Rate and Rhythm: Regular rhythm. Tachycardia present.   Pulmonary:      Effort: Pulmonary effort is normal. No respiratory distress.      Breath sounds: Normal breath sounds. No wheezing or rales.   Abdominal:      General: Abdomen is flat. Bowel sounds are normal. There is no distension.      Palpations: Abdomen is soft.      Tenderness: There is no abdominal tenderness. There is no guarding.   Musculoskeletal:         General: No swelling or tenderness.   Skin:     Coloration: Skin is not jaundiced or pale.   Neurological:      General: No focal deficit present.      Mental Status: She is alert and oriented to person, place, and time.   Psychiatric:         Mood and Affect: Mood normal.         Behavior: Behavior normal.       Fluids    Intake/Output Summary (Last 24 hours) at 10/30/2022 0714  Last data filed at 10/30/2022 0649  Gross per 24 hour   Intake 100 ml   Output --   Net 100 ml       Laboratory  Recent Labs     10/29/22  2042 10/30/22  0556   WBC 13.0*  --    RBC 2.94*  --    HEMOGLOBIN 8.4* 7.2*   HEMATOCRIT 26.0* 22.2*   MCV 88.4  --    MCH 28.6  --    MCHC 32.3*  --    RDW 45.2  --    PLATELETCT 363  --    MPV 11.5  --      Recent Labs     10/29/22  2042   SODIUM 137   POTASSIUM 3.8   CHLORIDE 102   CO2 23   GLUCOSE 190*   BUN 38*   CREATININE 0.59   CALCIUM 9.0                   Imaging  DX-CHEST-PORTABLE (1 VIEW)   Final Result      1.  No acute cardiac or pulmonary abnormalities are identified.      EC-ECHOCARDIOGRAM COMPLETE W/O CONT    (Results Pending)        Assessment/Plan  * Syncope  Assessment & Plan  - Orthostatic in the ER  -Syncope under the context of rectal bleeding.  - BP 70/35 at home    SIRS (systemic inflammatory response syndrome) (HCC)  Assessment &  Plan  Mildly dirty UA, but pt without urinary symptoms - stop Rocephin, white count likely secondary to GIB and tachycardia from anemia           HLD (hyperlipidemia)  Assessment & Plan  -Continue rosuvastatin.    HTN (hypertension)  Assessment & Plan  -She takes lisinopril and hydrochlorothiazide.   - Hold home antihypertensives for now given her orthostatic hypotension in the ER and anemia     Leukocytosis  Assessment & Plan  -WBC is 13.0 on admission.  -Patient is also tachycardic therefore meeting SIRS criteria but this appears to be secondary to blood loss     Elevated glucose  Assessment & Plan  -Added hemoglobin A1c.  On admission 190.    Anemia due to acute blood loss  Assessment & Plan  -Under the context of rectal bleeding.  -Hemoglobin 8.4 on admission.  This is significantly lower than baseline.  -Will consult GI   - Transfuse 1u PRBC for Hgb 7.2 with associated tachycardia and orthostasis   - Check indices  - Consider IV iron pending indices, oral for now      GI bleed- (present on admission)  Assessment & Plan  -Patient reporting rectal bleeding, few episodes during the day.  No abdominal pain.  -She has a positive guaiac test done in the ED.  -Significant hemoglobin drop from 12.2 at baseline in August to now 7.2  -Serial H&H.  - Oral iron  - Dr Jerry consulted for scoping        VTE prophylaxis: SCDs/TEDs    I have performed a physical exam and reviewed and updated ROS and Plan today (10/30/2022). In review of yesterday's note (10/29/2022), there are no changes except as documented above.

## 2022-10-30 NOTE — CONSULTS
Gastroenterology Initial Consult Note               Author:  JR Canales Date & Time Created: 10/30/2022 11:13 AM       Patient ID:  Name:             Cindy Cruz  YOB: 1965  Age:                 57 y.o.  female  MRN:               [id number]      Referring Provider:  Shawanda Bess MD      Presenting Chief Complaint:  GI Bleed, Syncope      History of Present Illness:    This is a very pleasant 57 y.o. female seen in consultation for GI bleed and syncope.  The patient presented to the [facility] emergency room on 10/29/2022 with complaints of syncopal episode x2.  The patient states that over the last week or so she has been feeling more lethargic, tired, and nauseous.  However, last night she began having loose stools initially that appeared dark brown or black then transition to bright red over the last 2 days.  She got up to go to the bathroom last night to vomit, and during this became lightheaded and passed out for a few moments.  She took her blood pressure following this episode and this was low at 70/35 with a second syncopal episode and then she regained consciousness and blood pressure improved.  She denies abdominal pain, dysphagia, odynophagia, unintentional weight loss.  She states that she does not take NSAIDs regularly, but takes the occasional Excedrin for headaches.  Last dose of this was 1.5 weeks ago.  She had a hip replacement in May of this year and underwent cardiac evaluation with normal stress test and echocardiogram with clearance for the procedure at that time by cardiology.    Upon initial evaluation noted to have hemoglobin 8.4 which trended down to 7.2.  Her normal hemoglobin ranges around 12.  She does have mild WBC elevation.  She has never had a colonoscopy or endoscopy.      Review of Systems:  Review of Systems   Constitutional:  Positive for malaise/fatigue. Negative for chills and fever.   HENT:  Positive for congestion. Negative for  sinus pain.    Eyes:  Negative for pain and discharge.   Respiratory:  Positive for sputum production. Negative for cough, hemoptysis and wheezing.    Cardiovascular:  Negative for chest pain, palpitations and orthopnea.   Gastrointestinal:  Positive for blood in stool, melena and nausea. Negative for abdominal pain and vomiting.   Genitourinary:  Negative for flank pain and hematuria.   Musculoskeletal:  Positive for falls and joint pain.   Skin:  Negative for itching and rash.   Neurological:  Positive for weakness and headaches. Negative for dizziness.   Endo/Heme/Allergies:  Negative for environmental allergies and polydipsia.   Psychiatric/Behavioral:  Negative for memory loss. The patient is not nervous/anxious.            Past Medical History:  Past Medical History:   Diagnosis Date   • Arthritis     osteo LT hip   • Asthma    • Hypertension    • Pain 05/17/2022    LT hip   • PKU (phenylketonuria) (Formerly [facility])      Active Hospital Problems    Diagnosis    • GI bleed [K92.2]    • Anemia due to acute blood loss [D62]    • Elevated glucose [R73.09]    • Leukocytosis [D72.829]    • HTN (hypertension) [I10]    • HLD (hyperlipidemia) [E78.5]    • SIRS (systemic inflammatory response syndrome) (Formerly [facility]) [R65.10]    • Syncope [R55]    • Hematochezia [K92.1]          Past Surgical History:  Past Surgical History:   Procedure Laterality Date   • WA TOTAL HIP ARTHROPLASTY Left 5/19/2022    Procedure: ARTHROPLASTY, HIP, TOTAL - ANY OTHER INDICATED PROCEDURES;  Surgeon: Arden Hamlin M.D.;  Location: SURGERY [facility];  Service: Orthopedics           Hospital Medications:  Current Facility-Administered Medications   Medication Dose Frequency Provider Last Rate Last Admin   • citalopram (CeleXA) tablet 10 mg  10 mg DAILY Elizabeth Walls M.D.   10 mg at 10/30/22 0609   • rosuvastatin (CRESTOR) tablet 10 mg  10 mg QHS Elizabeth Walls M.D.       • traZODone (DESYREL) tablet 50 mg  50 mg Nightly Elizabeth  MEGHAN Walls       • acetaminophen (Tylenol) tablet 650 mg  650 mg Q6HRS PRN Elizabeth Walls M.D.       • ondansetron (ZOFRAN) syringe/vial injection 4 mg  4 mg Q4HRS PRN Elizabeth Walls M.D.       • ondansetron (ZOFRAN ODT) dispertab 4 mg  4 mg Q4HRS PRN Elizabeth Walls M.D.       • promethazine (PHENERGAN) tablet 12.5-25 mg  12.5-25 mg Q4HRS PRN Elizabeth Walls M.D.       • promethazine (PHENERGAN) suppository 12.5-25 mg  12.5-25 mg Q4HRS PRN Elizabeth Walls M.D.       • prochlorperazine (COMPAZINE) injection 5-10 mg  5-10 mg Q4HRS PRN Elizabeth Walls M.D.       • senna-docusate (PERICOLACE or SENOKOT S) 8.6-50 MG per tablet 2 Tablet  2 Tablet BID Elizabeth Walls M.D.        And   • polyethylene glycol/lytes (MIRALAX) PACKET 1 Packet  1 Packet QDAY PRN Elizabeth Walls M.D.        And   • magnesium hydroxide (MILK OF MAGNESIA) suspension 30 mL  30 mL QDAY PRN Elizabeth Walls M.D.        And   • bisacodyl (DULCOLAX) suppository 10 mg  10 mg QDAY PRN Elizabeth Walls M.D.       • [Held by provider] lisinopril (PRINIVIL) tablet 20 mg  20 mg Q DAY Elizabeth Walls M.D.   20 mg at 10/30/22 0609   • [Held by provider] hydroCHLOROthiazide (HYDRODIURIL) tablet 12.5 mg  12.5 mg Q DAY Elizabeth Walls M.D.   12.5 mg at 10/30/22 0609   • fenofibrate micronized (LOFIBRA) capsule 134 mg  134 mg QHS Elizabeth Walls M.D.       • pantoprazole (Protonix) injection 40 mg  40 mg BID Elizabeth Walls M.D.   40 mg at 10/30/22 0609   • lactated ringers infusion   Continuous Shawanda Bess M.D. 125 mL/hr at 10/30/22 0758 New Bag at 10/30/22 0758   • ferrous sulfate tablet 325 mg  325 mg QDAY with Breakfast Shawanda Bess M.D.       Last reviewed on 10/30/2022  8:45 AM by Adalid Valdivia       Current Outpatient Medications:  Medications Prior to Admission   Medication Sig Dispense Refill Last Dose   • beclomethasone  HFA (QVAR REDIHALER) 40 MCG/ACT inhaler Inhale 1 Puff 2 times a day.   10/29/2022 at 0700   • Cyanocobalamin ([employer] B12 GUMMIES PO) Take 2 Tablets by mouth every day.   10/27/2022 at AM   • doxycycline (VIBRAMYCIN) 100 MG Tab Take 100 mg by mouth 2 times a day. Pt started on 10/25/2022 for 10 day course   10/29/2022 at 1900   • albuterol 108 (90 Base) MCG/ACT Aero Soln inhalation aerosol Inhale 2 Puffs every 6 hours as needed for Shortness of Breath. 8.5 g 1 10/29/2022 at 1300   • citalopram (CELEXA) 10 MG tablet Take 10 mg by mouth every day.   10/29/2022 at 0700   • fenofibrate (TRIGLIDE) 160 MG tablet Take 160 mg by mouth at bedtime.   10/29/2022 at 2000   • fluticasone (FLONASE) 50 MCG/ACT nasal spray Administer 1 Spray into affected nostril(S) as needed. Indications: Stuffy Nose   10/28/2022 at Unknown   • lisinopril-hydrochlorothiazide (PRINZIDE) 20-12.5 MG per tablet Take 1 Tablet by mouth every day.   10/29/2022 at 0700   • meloxicam (MOBIC) 7.5 MG Tab Take 7.5 mg by mouth as needed for Severe Pain.   > 3 weeks at Unknown   • rosuvastatin (CRESTOR) 10 MG Tab Take 10 mg by mouth at bedtime.   10/29/2022 at 2000   • traZODone (DESYREL) 50 MG Tab Take 50 mg by mouth every evening.   10/28/2022 at PM   • docusate sodium (COLACE) 100 MG Cap Take 100 mg by mouth 2 times a day as needed for Constipation.   > 2.5 weeks at Unknown   • ASPIRIN-ACETAMINOPHEN-CAFFEINE PO Take 2 Tablets by mouth every 6 hours as needed for Headache.   10/29/2022 at 1547   • VITAMIN D PO Take 1 Tablet by mouth every day.   10/27/2022 at AM   • Ascorbic Acid (VITAMIN C PO) Take 1 Tablet by mouth every day.   10/29/2022 at 0700   • Multiple Vitamins-Minerals (ZINC PO) Take 1 Tablet by mouth every day.   10/27/2022 at AM   • BIOTIN PO Take 1 Tablet by mouth every day.   10/27/2022 at AM         Medication Allergies:  Allergies   Allergen Reactions   • Food Swelling     Pumpkin seeds, tongue swelling    • Keflex Palpitations     Pt reports that  "she also gets a headache    • Penicillins      Burning sensation         Family Medical History:  History reviewed. No pertinent family history.      Social History:  Social History     Socioeconomic History   • Marital status:      Spouse name: Not on file   • Number of children: Not on file   • Years of education: Not on file   • Highest education level: Not on file   Occupational History   • Not on file   Tobacco Use   • Smoking status: Never   • Smokeless tobacco: Never   Vaping Use   • Vaping Use: Never used   Substance and Sexual Activity   • Alcohol use: Not Currently   • Drug use: Not Currently   • Sexual activity: Not on file   Other Topics Concern   • Not on file   Social History Narrative   • Not on file     Social Determinants of Health     Financial Resource Strain: Not on file   Food Insecurity: Not on file   Transportation Needs: Not on file   Physical Activity: Not on file   Stress: Not on file   Social Connections: Not on file   Intimate Partner Violence: Not on file   Housing Stability: Not on file         Vital signs:  Weight/BMI: Body mass index is 33.83 kg/m².  /64   Pulse (!) 102   Temp 37.2 °C (99 °F) (Oral)   Resp 18   Ht 1.575 m (5' 2\")   Wt 83.9 kg (184 lb 15.5 oz)   SpO2 98%   Vitals:    10/30/22 0255 10/30/22 0411 10/30/22 0604 10/30/22 0700   BP:  120/59 118/67 113/64   Pulse:  (!) 103 (!) 104 (!) 102   Resp:  18  18   Temp:  37.3 °C (99.1 °F)  37.2 °C (99 °F)   TempSrc:  Oral  Oral   SpO2:  98%  98%   Weight: 83.9 kg (184 lb 15.5 oz) 83.9 kg (184 lb 15.5 oz)     Height:         Oxygen Therapy:  Pulse Oximetry: 98 %, O2 Delivery Device: None - Room Air    Intake/Output Summary (Last 24 hours) at 10/30/2022 1113  Last data filed at 10/30/2022 0850  Gross per 24 hour   Intake 100 ml   Output --   Net 100 ml         Physical Exam:  Physical Exam  Vitals and nursing note reviewed.   Constitutional:       Appearance: She is obese. She is ill-appearing.   HENT:      Right " Ear: External ear normal.      Left Ear: External ear normal.      Nose: Nose normal. No congestion.      Mouth/Throat:      Pharynx: Oropharynx is clear. No oropharyngeal exudate.   Eyes:      General: No scleral icterus.     Extraocular Movements: Extraocular movements intact.      Pupils: Pupils are equal, round, and reactive to light.   Cardiovascular:      Rate and Rhythm: Normal rate and regular rhythm.      Pulses: Normal pulses.      Heart sounds: Normal heart sounds. No murmur heard.  Pulmonary:      Effort: Pulmonary effort is normal.      Breath sounds: Normal breath sounds. No wheezing or rales.   Abdominal:      General: Abdomen is flat. Bowel sounds are normal. There is no distension.      Palpations: Abdomen is soft.      Tenderness: There is no abdominal tenderness.   Musculoskeletal:      Cervical back: Neck supple.      Right lower leg: No edema.      Left lower leg: No edema.   Lymphadenopathy:      Cervical: No cervical adenopathy.   Skin:     General: Skin is warm and dry.      Coloration: Skin is pale.      Findings: Lesion (two areas of what appear to be possible spider angioma to upper chest) present.   Neurological:      General: No focal deficit present.      Mental Status: She is alert and oriented to person, place, and time.   Psychiatric:         Thought Content: Thought content normal.         Judgment: Judgment normal.         Labs:  Recent Labs     10/29/22  2042   SODIUM 137   POTASSIUM 3.8   CHLORIDE 102   CO2 23   BUN 38*   CREATININE 0.59   CALCIUM 9.0     Recent Labs     10/29/22  2042   ALTSGPT 12   ASTSGOT 18   ALKPHOSPHAT 51   TBILIRUBIN 0.4   LIPASE 48   GLUCOSE 190*     Recent Labs     10/29/22  2042   WBC 13.0*   NEUTSPOLYS 83.50*   LYMPHOCYTES 11.70*   MONOCYTES 3.20   EOSINOPHILS 0.20   BASOPHILS 0.90   ASTSGOT 18   ALTSGPT 12   ALKPHOSPHAT 51   TBILIRUBIN 0.4     Recent Labs     10/29/22  2042 10/30/22  0556   RBC 2.94*  --    HEMOGLOBIN 8.4* 7.2*   HEMATOCRIT 26.0*  22.2*   PLATELETCT 363  --      Recent Results (from the past 24 hour(s))   CBC WITH DIFFERENTIAL    Collection Time: 10/29/22  8:42 PM   Result Value Ref Range    WBC 13.0 (H) 4.8 - 10.8 K/uL    RBC 2.94 (L) 4.20 - 5.40 M/uL    Hemoglobin 8.4 (L) 12.0 - 16.0 g/dL    Hematocrit 26.0 (L) 37.0 - 47.0 %    MCV 88.4 81.4 - 97.8 fL    MCH 28.6 27.0 - 33.0 pg    MCHC 32.3 (L) 33.6 - 35.0 g/dL    RDW 45.2 35.9 - 50.0 fL    Platelet Count 363 164 - 446 K/uL    MPV 11.5 9.0 - 12.9 fL    Neutrophils-Polys 83.50 (H) 44.00 - 72.00 %    Lymphocytes 11.70 (L) 22.00 - 41.00 %    Monocytes 3.20 0.00 - 13.40 %    Eosinophils 0.20 0.00 - 6.90 %    Basophils 0.90 0.00 - 1.80 %    Immature Granulocytes 0.50 0.00 - 0.90 %    Nucleated RBC 0.00 /100 WBC    Neutrophils (Absolute) 10.89 (H) 2.00 - 7.15 K/uL    Lymphs (Absolute) 1.52 1.00 - 4.80 K/uL    Monos (Absolute) 0.42 0.00 - 0.85 K/uL    Eos (Absolute) 0.02 0.00 - 0.51 K/uL    Baso (Absolute) 0.12 0.00 - 0.12 K/uL    Immature Granulocytes (abs) 0.06 0.00 - 0.11 K/uL    NRBC (Absolute) 0.00 K/uL   COMP METABOLIC PANEL    Collection Time: 10/29/22  8:42 PM   Result Value Ref Range    Sodium 137 135 - 145 mmol/L    Potassium 3.8 3.6 - 5.5 mmol/L    Chloride 102 96 - 112 mmol/L    Co2 23 20 - 33 mmol/L    Anion Gap 12.0 7.0 - 16.0    Glucose 190 (H) 65 - 99 mg/dL    Bun 38 (H) 8 - 22 mg/dL    Creatinine 0.59 0.50 - 1.40 mg/dL    Calcium 9.0 8.4 - 10.2 mg/dL    AST(SGOT) 18 12 - 45 U/L    ALT(SGPT) 12 2 - 50 U/L    Alkaline Phosphatase 51 30 - 99 U/L    Total Bilirubin 0.4 0.1 - 1.5 mg/dL    Albumin 4.3 3.2 - 4.9 g/dL    Total Protein 6.6 6.0 - 8.2 g/dL    Globulin 2.3 1.9 - 3.5 g/dL    A-G Ratio 1.9 g/dL   LIPASE    Collection Time: 10/29/22  8:42 PM   Result Value Ref Range    Lipase 48 7 - 58 U/L   TROPONIN    Collection Time: 10/29/22  8:42 PM   Result Value Ref Range    Troponin T 10 6 - 19 ng/L   D-DIMER    Collection Time: 10/29/22  8:42 PM   Result Value Ref Range    D-Dimer  Screen 0.36 0.00 - 0.50 ug/mL (FEU)   ESTIMATED GFR    Collection Time: 10/29/22  8:42 PM   Result Value Ref Range    GFR (CKD-EPI) 105 >60 mL/min/1.73 m 2   EKG    Collection Time: 10/29/22 10:53 PM   Result Value Ref Range    Report       [facility] Emergency Dept.    Test Date:  2022-10-29  Pt Name:    CASEY ROLLINS                 Department: [facility]  MRN:        [id number]                      Room:  Gender:     Female                       Technician: LINDA  :        1965                   Requested By:ER TRIAGE PROTOCOL  Order #:    [id number]                    Reading MD: Artemio Castle    Measurements  Intervals                                Axis  Rate:       109                          P:          62  NE:         135                          QRS:        31  QRSD:       103                          T:          34  QT:         349  QTc:        471    Interpretive Statements  Sinus tachycardia  Compared to ECG 2022 09:57:47  Sinus rhythm no longer present  Electronically Signed On 10- 23:15:27 PDT by Artemio Castle     Urinalysis    Collection Time: 10/30/22  1:20 AM    Specimen: Urine   Result Value Ref Range    Color Yellow     Character Clear     Specific Gravity 1.020 <1.035    Ph 5.5 5.0 - 8.0    Glucose Negative Negative mg/dL    Ketones Trace (A) Negative mg/dL    Protein Negative Negative mg/dL    Bilirubin Negative Negative    Nitrite Negative Negative    Leukocyte Esterase Small (A) Negative    Occult Blood Large (A) Negative    Micro Urine Req Microscopic    URINE MICROSCOPIC (W/UA)    Collection Time: 10/30/22  1:20 AM   Result Value Ref Range    WBC 5-10 (A) /hpf    RBC  (A) /hpf    Bacteria Few (A) None /hpf    Epithelial Cells Few Few /hpf    Mucous Threads Moderate /hpf   Troponin    Collection Time: 10/30/22  2:05 AM   Result Value Ref Range    Troponin T 9 6 - 19 ng/L   Troponin    Collection Time: 10/30/22  5:56 AM   Result Value Ref Range    Troponin T  9 6 - 19 ng/L   HEMOGLOBIN AND HEMATOCRIT    Collection Time: 10/30/22  5:56 AM   Result Value Ref Range    Hemoglobin 7.2 (L) 12.0 - 16.0 g/dL    Hematocrit 22.2 (L) 37.0 - 47.0 %   PROCALCITONIN    Collection Time: 10/30/22  5:56 AM   Result Value Ref Range    Procalcitonin 0.12 <0.25 ng/mL   COD - Adult (Type and Screen)    Collection Time: 10/30/22  5:56 AM   Result Value Ref Range    ABO Grouping Only O     Rh Grouping Only POS     Antibody Screen-Cod NEG     Component R       R4                  Red Blood Cells     [id number]   selected     10/30/22   08:33      Product Type Red Blood Cells LR Pheresis     Dispense Status selected     Unit Number (Barcoded) [id number]     Product Code (Barcoded) [id number]     Blood Type (Barcoded) 5100          Radiology Review:  EC-ECHOCARDIOGRAM COMPLETE W/O CONT         DX-CHEST-PORTABLE (1 VIEW)   Final Result      1.  No acute cardiac or pulmonary abnormalities are identified.            MDM (Data Review):   -Records reviewed and summarized in current documentation  -I personally reviewed and interpreted the laboratory results  -I personally reviewed the radiology images        Medical Decision Making, by Problem:  Active Hospital Problems    Diagnosis    • GI bleed [K92.2]    • Anemia due to acute blood loss [D62]    • Elevated glucose [R73.09]    • Leukocytosis [D72.829]    • HTN (hypertension) [I10]    • HLD (hyperlipidemia) [E78.5]    • SIRS (systemic inflammatory response syndrome) (HCC) [R65.10]    • Syncope [R55]    • Hematochezia [K92.1]            Assessment/Recommendations:  57-year-old female with recent worsening lethargy after sinus infection and treatment with doxycycline who developed what sounds to be dark stools then red stools and syncope over the last day.  Syncopal episodes seem to be related to vasovagal response and have resolved.  She had a cardiac evaluation prior to hip surgery earlier this year which was normal.  Differential diagnosis for  GI bleeding does include peptic ulcer disease, malignancy, diverticular bleed, or other bleeding lesion.    Assessment:  -Melena and hematochezia-suggestive of upper or lower GI bleed  -Vasovagal syncope  -Acute posthemorrhagic anemia  -Leukocytosis-likely reactive  -Obesity  -Hyperlipidemia  -Hypertension    Plan:  -Clear liquid diet, n.p.o. after midnight  -Protonix 40 mg IV twice daily  -MoviPrep this evening with split prep.  Okay to mix with Gatorade avoiding red or purple Gatorade  -Esophagogastroduodenoscopy and colonoscopy with possible hemostasis, biopsies, other intervention with Dr. Vasile Negron at 8 AM tomorrow.    Risks, benefits, and alternatives of aforementioned procedures were discussed with patient and  Shorty at bedside.  Consenting person(s) were given opportunities to ask questions and discuss other options.  Risks including but not limited to perforation, infection, bleeding, missed lesion(s), possible need for surgery(ies) and/or interventional radiology, possible need for repeat procedure(s) and/or additional testing, hospitalization possibly prolonged, cardiac and/or pulmonary event, aspiration, hypoxia, stroke, medication and/or anesthesia reaction, indefinite diagnosis, discomfort, unsuccessful and/or incomplete procedure, ineffective therapy and/or persistent symptoms, damage to adjacent organs and/or vascular structures, and other adverse events possibly life-threatening.  Interactive discussion was undertaken with Layman's terms. I answered questions in full and to satisfaction.  Consenting person(s) stated understanding and acceptance of these risks, and wished to proceed.  Informed consent was given in clear state of mind.    -Serial hemoglobin and hematocrit, transfuse for hemoglobin less than 7  -Avoid NSAIDs, anticoagulants  -If patient develops recurrent urgent or emergent bleeding overnight recommend CTA and may need to be transferred for IR intervention but GI on-call should  be notified immediately    BAY Canales.      Thank you for inviting me to participate in the care of this patient. Please do not hesitate to call GI consultants with additional questions/concerns or changes in the patient's clinical status at [phone].      Core Quality Measures   Reviewed items:  Labs, Medications and Radiology reports reviewed

## 2022-10-30 NOTE — ED PROVIDER NOTES
"ED Provider Note      Means of Arrival: Private vehicle  History obtained from: Patient, medical record    CHIEF COMPLAINT  Chief Complaint   Patient presents with    Syncope     Reports syncopal episode x2 tonight. Pt. Reports feeling dizzy prior to episodes, denies CP, SOB, h/a now or prior to syncopal episodes. Pt. Spouse endorses that \"she told me while she was on the floor that she felt her heart pounding\".        HPI  Cindy Cruz is a 57 y.o. female who presents with syncope.  The patient reports that she was recently started on doxycycline for a sinus infection, began having diarrhea yesterday.  Today she has been having intermittent nausea.  She had the sensation that she had to get up and go vomit.  On her way to the bathroom she began to feel very lightheaded and ended up falling on the floor.  She denies any injury from this other than bending her knee.  She denies any preceding chest pain.  When her  went to sit her up, she became very lightheaded and passed out again.  She was ultimately able to get back to the bed and a blood pressure was taken, which was 70/35.  She then had a second round where she thought she would have to vomit and got up from the bed and had a second syncopal episode.  She denies any prior history of syncope.  She denies any chest pain or shortness of breath.  She does have a history of asthma and has been reporting with a dry cough associated with her reported sinusitis.  She reports that she has been drinking today but limited eating.  She denies any leg swelling.  No fevers today but did have a single recent fever.  Denies any dysuria, hematuria, abdominal pain    REVIEW OF SYSTEMS  CONSTITUTIONAL:  No fever.  CARDIOVASCULAR:  See HPI  RESPIRATORY:  See HPI  GASTROINTESTINAL:  No abdominal pain.  GENITOURINARY:   No dysuria.  MUSCULOSKELETAL:  No arthralgia.  NEUROLOGIC:   No headache.    See HPI for further details.   All other systems are negative.     PAST " "MEDICAL HISTORY  Past Medical History:   Diagnosis Date    Arthritis     osteo LT hip    Asthma     Hypertension     Pain 05/17/2022    LT hip    PKU (phenylketonuria) (HCC)        FAMILY HISTORY  History reviewed. No pertinent family history.    SOCIAL HISTORY   reports that she has never smoked. She has never used smokeless tobacco. She reports that she does not currently use alcohol. She reports that she does not currently use drugs.    SURGICAL HISTORY  Past Surgical History:   Procedure Laterality Date    [place] TOTAL HIP ARTHROPLASTY Left 5/19/2022    Procedure: ARTHROPLASTY, HIP, TOTAL - ANY OTHER INDICATED PROCEDURES;  Surgeon: Arden Hamlin M.D.;  Location: SURGERY [facility];  Service: Orthopedics       CURRENT MEDICATIONS  Home Medications    **Home medications have not yet been reviewed for this encounter**         ALLERGIES  Allergies   Allergen Reactions    Food Anaphylaxis     Pumpkin seeds    Keflex Palpitations    Penicillins Myalgia     Burning sensation       PHYSICAL EXAM  VITAL SIGNS: BP (!) 148/73   Pulse (!) 116   Temp 36.1 °C (96.9 °F) (Temporal)   Resp 20   Ht 1.575 m (5' 2\")   Wt 83.5 kg (184 lb 1.4 oz)   SpO2 96%   BMI 33.67 kg/m²    Gen: Alert  HENT: ATNC, dry mucous membranes  Eyes: Normal conjunctiva  Neck: trachea midline  Resp: no respiratory distress, CTAB  CV: No JVD, tachycardic, RRR, no m/r/g. Equal radial pulses  Abd: non-distended, non-tender  Rectal exam: Medium brown stool, heme positive  Ext: No deformities, no edema  Psych: normal mood  Neuro: speech fluent, moves all extremities, GCS 15      RADIOLOGY/PROCEDURES  DX-CHEST-PORTABLE (1 VIEW)   Final Result      1.  No acute cardiac or pulmonary abnormalities are identified.      EC-ECHOCARDIOGRAM COMPLETE W/ CONT    (Results Pending)       LABS  Labs Reviewed   CBC WITH DIFFERENTIAL - Abnormal; Notable for the following components:       Result Value    WBC 13.0 (*)     RBC 2.94 (*)     Hemoglobin 8.4 (*)     " Hematocrit 26.0 (*)     MCHC 32.3 (*)     Neutrophils-Polys 83.50 (*)     Lymphocytes 11.70 (*)     Neutrophils (Absolute) 10.89 (*)     All other components within normal limits    Narrative:     Biotin intake of greater than 5 mg per day may interfere with  troponin levels, causing false low values.   COMP METABOLIC PANEL - Abnormal; Notable for the following components:    Glucose 190 (*)     Bun 38 (*)     All other components within normal limits    Narrative:     Biotin intake of greater than 5 mg per day may interfere with  troponin levels, causing false low values.   LIPASE    Narrative:     Biotin intake of greater than 5 mg per day may interfere with  troponin levels, causing false low values.   TROPONIN    Narrative:     Biotin intake of greater than 5 mg per day may interfere with  troponin levels, causing false low values.   D-DIMER    Narrative:     Biotin intake of greater than 5 mg per day may interfere with  troponin levels, causing false low values.   URINALYSIS   ESTIMATED GFR    Narrative:     Biotin intake of greater than 5 mg per day may interfere with  troponin levels, causing false low values.   TROPONIN   TROPONIN   URINE MICROSCOPIC (W/UA)        EKG  Results for orders placed or performed during the hospital encounter of 10/29/22   EKG   Result Value Ref Range    Report       [facility] Emergency Dept.    Test Date:  2022-10-29  Pt Name:    CASEY ROLLINS                 Department: [facility]  MRN:        [id number]                      Room:  Gender:     Female                       Technician: LINDA  :        1965                   Requested By:ER TRIAGE PROTOCOL  Order #:    [id number]                    Reading MD: Artemio Castle    Measurements  Intervals                                Axis  Rate:       109                          P:          62  NY:         135                          QRS:        31  QRSD:       103                          T:          34  QT:          349  QTc:        471    Interpretive Statements  Sinus tachycardia  Compared to ECG 08/06/2022 09:57:47  Sinus rhythm no longer present  Electronically Signed On 10- 23:15:27 PDT by Artemio Castle          COURSE & MEDICAL DECISION MAKING  Pertinent Labs & Imaging studies reviewed. (See chart for details)    Review medical record demonstrates the patient had an exercise MPI 5/5/2022, negative for ischemia or infarction.  EF 72%.  She does have an elevated calcium score and follows with cardiology.    She was seen for chest pain and shortness of breath 8/6/2022, had a negative CT PE, troponin    Patient presents with symptoms consistent with orthostatic syncope.  The patient does appear to be dehydrated.  Possibly associated with her diarrhea which may be antibiotic associated.  She has a benign abdominal examination.  Given her tachycardia, multiple syncopal episodes, will perform D-dimer to evaluate for possible PE, screening labs for potential sepsis.  Patient's EKG demonstrates no high risk features that would suggest primary cardiac etiology such as Brugada syndrome, preexcitation, AVRT, heart block.    Patient does note to have new anemia with hemoglobin 8.4 down from 12.6 a few months ago.  A rectal exam was then performed, which is heme positive.  This may be contributing to the patient's overall presentation of having acute GI bleed.  Will provide IV pantoprazole.    D-dimer negative for PE.  Elevated BUN to creatinine ratio may suggest upper GI bleed.      HEART Score: 1    Appropriate PPE were worn at this encounter.     FINAL IMPRESSION  1. Orthostatic syncope    2. Gastrointestinal hemorrhage, unspecified gastrointestinal hemorrhage type          Case discussed with Dr. Perry , who will evaluate the patient for hospitalization. Patient will be hospitalized in guarded condition.      This dictation was created using voice recognition software. The accuracy of the dictation is limited to the  abilities of the software. I expect there may be some errors of grammar and possibly content. The nursing notes were reviewed and certain aspects of this information were incorporated into this note.

## 2022-10-30 NOTE — ASSESSMENT & PLAN NOTE
-Syncope under the context of rectal bleeding - likely vasovagal due to n/v/d/GIB. Echo normal.  - BP 70/35 at home

## 2022-10-30 NOTE — ASSESSMENT & PLAN NOTE
Mildly dirty UA, but pt without urinary symptoms - stopped Rocephin, white count likely secondary to GIB and tachycardia from anemia   Procal negative

## 2022-10-30 NOTE — CARE PLAN
The patient is Stable - Low risk of patient condition declining or worsening    Shift Goals  Clinical Goals: Complete blood transfusion  Patient Goals: discharge    Progress made toward(s) clinical / shift goals:  blood ordered    Patient is not progressing towards the following goals:pt not discharging today per MD

## 2022-10-30 NOTE — H&P
"Hospital Medicine History & Physical Note    Date of Service  10/30/2022    Primary Care Physician  Jie Starr D.O.    Consultants  None    Code Status  Full Code    Chief Complaint  Chief Complaint   Patient presents with    Syncope     Reports syncopal episode x2 tonight. Pt. Reports feeling dizzy prior to episodes, denies CP, SOB, h/a now or prior to syncopal episodes. Pt. Spouse endorses that \"she told me while she was on the floor that she felt her heart pounding\".        History of Presenting Illness  Cindy Cruz is a 57 y.o. female, with history of HTN, HLD, who presented to the ER on 10/29/2022 for evaluation of rectal bleeding and 2 syncopal episode.       SYNCOPE:  -First episode, she felt very lightheaded on her way to the bathroom, started once again feeling lightheaded able to kneel down prior to falling.  Second episode, she was in bed and woke up nauseated, lightheaded and \"lethargic\" she denies having any injury or head trauma.  She was down for a few seconds.  -There was no chest pain or shortness of breath.  -She does report having bloody diarrhea earlier in the day.  - took her blood pressure that came back 70 over 30s.    Patient has been on doxycycline since last Tuesday for upper respiratory infection.  Ever since on antibiotics has been feeling nauseous.        I discussed the plan of care with patient.    Review of Systems  Review of Systems   Constitutional:  Positive for malaise/fatigue. Negative for fever.   HENT:  Negative for congestion and sore throat.    Eyes:  Negative for blurred vision and double vision.   Respiratory:  Negative for cough and shortness of breath.    Cardiovascular:  Negative for chest pain and palpitations.   Gastrointestinal:  Positive for blood in stool, diarrhea and nausea. Negative for vomiting.   Genitourinary:  Negative for dysuria and urgency.   Musculoskeletal:  Negative for myalgias and neck pain.   Skin:  Negative for itching " and rash.   Neurological:  Positive for loss of consciousness. Negative for dizziness, weakness and headaches.   Endo/Heme/Allergies:  Does not bruise/bleed easily.   Psychiatric/Behavioral:  Negative for depression. The patient does not have insomnia.      Past Medical History   has a past medical history of Arthritis, Asthma, Hypertension, Pain (05/17/2022), and PKU (phenylketonuria) ([facility]).    Surgical History   has a past surgical history that includes pr total hip arthroplasty (Left, 5/19/2022).     Family History  Reviewed and not pertinent  Family history reviewed with patient. There is no family history that is pertinent to the chief complaint.     Social History   reports that she has never smoked. She has never used smokeless tobacco. She reports that she does not currently use alcohol. She reports that she does not currently use drugs.    Allergies  Allergies   Allergen Reactions    Food Anaphylaxis     Pumpkin seeds    Keflex Palpitations    Penicillins Myalgia     Burning sensation       Medications  Prior to Admission Medications   Prescriptions Last Dose Informant Patient Reported? Taking?   Ascorbic Acid (VITAMIN C PO)   Yes No   Sig: Take 500 mg by mouth every day.   BIOTIN PO   Yes No   Sig: Take 1 Tablet by mouth every day.   Cyanocobalamin (VITAMIN B-12 PO)   Yes No   Sig: Take 1 Tablet by mouth every day.   Multiple Vitamins-Minerals (ZINC PO)   Yes No   Sig: Take 1 Tablet by mouth every day.   VITAMIN D PO   Yes No   Sig: Take 1 Tablet by mouth every day.   albuterol 108 (90 Base) MCG/ACT Aero Soln inhalation aerosol   No No   Sig: Inhale 2 Puffs every 6 hours as needed for Shortness of Breath.   asa/apap/caffeine (EXCEDRIN) 250-250-65 MG Tab   Yes No   Sig: Take 1 Tablet by mouth every 6 hours as needed for Headache.   budesonide-formoterol (SYMBICORT) 160-4.5 MCG/ACT Aerosol   Yes No   Sig: Inhale 2 Puffs 2 times a day.   citalopram (CELEXA) 10 MG tablet   Yes No   Sig: Take 10 mg by mouth  every day.   docusate sodium (COLACE) 100 MG Cap   Yes No   Sig: Colace 100 mg capsule   Take 1 capsule twice a day by oral route as needed for 30 days.   fenofibrate (TRIGLIDE) 160 MG tablet   Yes No   Sig: Take 160 mg by mouth at bedtime.   fluticasone (FLONASE) 50 MCG/ACT nasal spray   Yes No   Sig: Administer 1 Spray into affected nostril(S) as needed.   lisinopril-hydrochlorothiazide (PRINZIDE) 20-12.5 MG per tablet   Yes No   Sig: Take 1 Tablet by mouth every day.   meloxicam (MOBIC) 7.5 MG Tab   Yes No   Sig: meloxicam 7.5 mg tablet   TAKE 1 TABLET BY MOUTH EVERY DAY AS NEEDED   oxyCODONE-acetaminophen (PERCOCET-10)  MG Tab   Yes No   rosuvastatin (CRESTOR) 10 MG Tab   Yes No   Sig: Take 10 mg by mouth at bedtime.   traZODone (DESYREL) 50 MG Tab   Yes No   Sig: Take 50 mg by mouth every evening.      Facility-Administered Medications: None       Physical Exam  Temp:  [36.1 °C (96.9 °F)] 36.1 °C (96.9 °F)  Pulse:  [116-119] 116  Resp:  [18-20] 20  BP: (108-148)/(56-79) 148/73  SpO2:  [96 %-98 %] 96 %  Blood Pressure: (!) 148/73   Temperature: 36.1 °C (96.9 °F)   Pulse: (!) 116   Respiration: 20   Pulse Oximetry: 96 %       Physical Exam  Constitutional:       Appearance: Normal appearance.   HENT:      Head: Normocephalic and atraumatic.      Mouth/Throat:      Mouth: Mucous membranes are moist.      Pharynx: Oropharynx is clear.   Eyes:      Extraocular Movements: Extraocular movements intact.      Pupils: Pupils are equal, round, and reactive to light.   Cardiovascular:      Rate and Rhythm: Normal rate and regular rhythm.      Heart sounds: Normal heart sounds.   Pulmonary:      Effort: Pulmonary effort is normal.      Breath sounds: Normal breath sounds.   Abdominal:      General: Abdomen is flat. Bowel sounds are normal.      Palpations: Abdomen is soft.   Genitourinary:     Comments: Per ERP, positive guaiac test.  Musculoskeletal:      Cervical back: Normal range of motion and neck supple.    Skin:     General: Skin is warm and dry.   Neurological:      General: No focal deficit present.      Mental Status: She is alert and oriented to person, place, and time.   Psychiatric:         Mood and Affect: Mood normal.         Behavior: Behavior normal.       Laboratory:  Recent Labs     10/29/22  2042   WBC 13.0*   RBC 2.94*   HEMOGLOBIN 8.4*   HEMATOCRIT 26.0*   MCV 88.4   MCH 28.6   MCHC 32.3*   RDW 45.2   PLATELETCT 363   MPV 11.5     Recent Labs     10/29/22  2042   SODIUM 137   POTASSIUM 3.8   CHLORIDE 102   CO2 23   GLUCOSE 190*   BUN 38*   CREATININE 0.59   CALCIUM 9.0     Recent Labs     10/29/22  2042   ALTSGPT 12   ASTSGOT 18   ALKPHOSPHAT 51   TBILIRUBIN 0.4   LIPASE 48   GLUCOSE 190*         No results for input(s): NTPROBNP in the last 72 hours.      Recent Labs     10/29/22  2042   TROPONINT 10       Imaging:  DX-CHEST-PORTABLE (1 VIEW)   Final Result      1.  No acute cardiac or pulmonary abnormalities are identified.      EC-ECHOCARDIOGRAM COMPLETE W/ CONT    (Results Pending)       EKG:  My impression is: Sinus tachycardia 109 bpm.  No acute ST elevation.    Assessment/Plan:  Justification for Admission Status  I anticipate this patient is appropriate for observation status at this time because 57-year-old female, syncopal episode, likely vasovagal under the context of rectal bleeding.  She is hemodynamically stable and not requiring any blood transfusion.  She has a softly positive UTI and happened have SIRS criteria meeting sepsis however I believe tachycardia is also secondary to underlying anemia, reason why I will keep her under observation at this time.      * Syncope  Assessment & Plan  -Observation status to telemetry unit.  -Syncope under the context of rectal bleeding.  -Symptoms most consistent with vasovagal in nature.  Cannot fully exclude cardiac causes though.  -No acute ischemia on EKG.  -Serial cardiac enzymes and echocardiogram in the morning.  She received 1 L bolus of LR  in the ED.    Sepsis secondary to UTI (HCC)  Assessment & Plan  This is Sepsis Present on admission  SIRS criteria identified on my evaluation include: Tachycardia, with heart rate greater than 90 BPM and Leukocytosis, with WBC greater than 12,000  Source is acute UTI  Sepsis protocol initiated  Fluid resuscitation ordered per protocol  Crystalloid Fluid Administration: Fluid resuscitation ordered per standard protocol - 30 mL/kg per current or ideal body weight  IV antibiotics as appropriate for source of sepsis  Reassessment: I have reassessed the patient's hemodynamic status          Anemia due to acute blood loss  Assessment & Plan  -Under the context of rectal bleeding.  -Hemoglobin 8.4 on admission.  This is significantly lower than baseline.  -Serial H&H, transfuse if hemoglobin less than 7.      GI bleed- (present on admission)  Assessment & Plan  -Patient reporting rectal bleeding, few episodes during the day.  No abdominal pain.  -She has a positive guaiac test done in the ED.  -Significant hemoglobin drop from 12.2 at baseline in August to now 8.4.  -Serial H&H.  -May need nonemergency GI consult in the morning.    Elevated glucose  Assessment & Plan  -Added hemoglobin A1c.  On admission 190.    HTN (hypertension)  Assessment & Plan  -She takes lisinopril and hydrochlorothiazide.  Blood pressure now is 120/59.  -There is concern of hypotension with blood pressures 70/30s at home.  But she was at some point hypertensive to 148/73 in the ED.  I will resume her medications but please hold off if blood pressure is trending down.    Leukocytosis  Assessment & Plan  -WBC is 13.0 on admission.  -Patient is also tachycardic therefore meeting SIRS criteria.    HLD (hyperlipidemia)  Assessment & Plan  -Continue rosuvastatin.      VTE prophylaxis: SCDs/TEDs

## 2022-10-30 NOTE — PROGRESS NOTES
4 Eyes Skin Assessment Completed by MILKA Rosenberg and MILKA San.    Head WDL  Ears WDL  Nose WDL  Mouth WDL  Neck WDL  Breast/Chest WDL  Shoulder Blades WDL  Spine WDL  (R) Arm/Elbow/Hand WDL  (L) Arm/Elbow/Hand WDL  Abdomen WDL  Groin WDL  Scrotum/Coccyx/Buttocks WDL  (R) Leg WDL  (L) Leg WDL  (R) Heel/Foot/Toe WDL  (L) Heel/Foot/Toe WDL          Devices In Places Tele Box      Interventions In Place Pressure Redistribution Mattress    Possible Skin Injury No    Pictures Uploaded Into Epic N/A  Wound Consult Placed N/A  RN Wound Prevention Protocol Ordered No  Shakira

## 2022-10-30 NOTE — PROGRESS NOTES
Telemetry Shift Summary     Rhythm: ST  HR: 104  Ectopy: none  Measurements: 14/09/35   (Per 0400 strip)  Normal Values  Rhythm: SR  HR:   Measurements: 0.12-0.20 / 0.04-0.10 / 0.30-0.52    Strip reviewed and placed in chart

## 2022-10-31 ENCOUNTER — ANESTHESIA EVENT (OUTPATIENT)
Dept: SURGERY | Facility: MEDICAL CENTER | Age: 57
DRG: 378 | End: 2022-10-31
Payer: COMMERCIAL

## 2022-10-31 ENCOUNTER — ANESTHESIA (OUTPATIENT)
Dept: SURGERY | Facility: MEDICAL CENTER | Age: 57
DRG: 378 | End: 2022-10-31
Payer: COMMERCIAL

## 2022-10-31 PROBLEM — K26.4 DUODENAL ULCER WITH HEMORRHAGE: Status: ACTIVE | Noted: 2022-10-30

## 2022-10-31 LAB
ANION GAP SERPL CALC-SCNC: 11 MMOL/L (ref 7–16)
BUN SERPL-MCNC: 21 MG/DL (ref 8–22)
CALCIUM SERPL-MCNC: 8.7 MG/DL (ref 8.4–10.2)
CHLORIDE SERPL-SCNC: 107 MMOL/L (ref 96–112)
CO2 SERPL-SCNC: 21 MMOL/L (ref 20–33)
CREAT SERPL-MCNC: 0.58 MG/DL (ref 0.5–1.4)
ERYTHROCYTE [DISTWIDTH] IN BLOOD BY AUTOMATED COUNT: 50.7 FL (ref 35.9–50)
GFR SERPLBLD CREATININE-BSD FMLA CKD-EPI: 105 ML/MIN/1.73 M 2
GLUCOSE SERPL-MCNC: 108 MG/DL (ref 65–99)
HCT VFR BLD AUTO: 21.4 % (ref 37–47)
HCT VFR BLD AUTO: 21.5 % (ref 37–47)
HCT VFR BLD AUTO: 21.7 % (ref 37–47)
HCT VFR BLD AUTO: 22.7 % (ref 37–47)
HGB BLD-MCNC: 6.9 G/DL (ref 12–16)
HGB BLD-MCNC: 7 G/DL (ref 12–16)
HGB BLD-MCNC: 7 G/DL (ref 12–16)
HGB BLD-MCNC: 7.3 G/DL (ref 12–16)
MCH RBC QN AUTO: 28.1 PG (ref 27–33)
MCHC RBC AUTO-ENTMCNC: 32.2 G/DL (ref 33.6–35)
MCV RBC AUTO: 87.3 FL (ref 81.4–97.8)
PATHOLOGY CONSULT NOTE: NORMAL
PLATELET # BLD AUTO: 217 K/UL (ref 164–446)
PMV BLD AUTO: 11.9 FL (ref 9–12.9)
POTASSIUM SERPL-SCNC: 3.4 MMOL/L (ref 3.6–5.5)
RBC # BLD AUTO: 2.6 M/UL (ref 4.2–5.4)
SODIUM SERPL-SCNC: 139 MMOL/L (ref 135–145)
WBC # BLD AUTO: 6.6 K/UL (ref 4.8–10.8)

## 2022-10-31 PROCEDURE — 88305 TISSUE EXAM BY PATHOLOGIST: CPT | Mod: 59

## 2022-10-31 PROCEDURE — 0DBN8ZX EXCISION OF SIGMOID COLON, VIA NATURAL OR ARTIFICIAL OPENING ENDOSCOPIC, DIAGNOSTIC: ICD-10-PCS | Performed by: INTERNAL MEDICINE

## 2022-10-31 PROCEDURE — 86923 COMPATIBILITY TEST ELECTRIC: CPT

## 2022-10-31 PROCEDURE — P9016 RBC LEUKOCYTES REDUCED: HCPCS

## 2022-10-31 PROCEDURE — 88312 SPECIAL STAINS GROUP 1: CPT

## 2022-10-31 PROCEDURE — 0W3P8ZZ CONTROL BLEEDING IN GASTROINTESTINAL TRACT, VIA NATURAL OR ARTIFICIAL OPENING ENDOSCOPIC: ICD-10-PCS | Performed by: INTERNAL MEDICINE

## 2022-10-31 PROCEDURE — 85014 HEMATOCRIT: CPT

## 2022-10-31 PROCEDURE — 160009 HCHG ANES TIME/MIN: Performed by: INTERNAL MEDICINE

## 2022-10-31 PROCEDURE — 36430 TRANSFUSION BLD/BLD COMPNT: CPT

## 2022-10-31 PROCEDURE — 700105 HCHG RX REV CODE 258: Performed by: FAMILY MEDICINE

## 2022-10-31 PROCEDURE — 160035 HCHG PACU - 1ST 60 MINS PHASE I: Performed by: INTERNAL MEDICINE

## 2022-10-31 PROCEDURE — A9270 NON-COVERED ITEM OR SERVICE: HCPCS | Performed by: HOSPITALIST

## 2022-10-31 PROCEDURE — 700111 HCHG RX REV CODE 636 W/ 250 OVERRIDE (IP): Performed by: FAMILY MEDICINE

## 2022-10-31 PROCEDURE — 99140 ANES COMP EMERGENCY COND: CPT | Performed by: ANESTHESIOLOGY

## 2022-10-31 PROCEDURE — 160048 HCHG OR STATISTICAL LEVEL 1-5: Performed by: INTERNAL MEDICINE

## 2022-10-31 PROCEDURE — 700111 HCHG RX REV CODE 636 W/ 250 OVERRIDE (IP): Performed by: ANESTHESIOLOGY

## 2022-10-31 PROCEDURE — 160203 HCHG ENDO MINUTES - 1ST 30 MINS LEVEL 4: Performed by: INTERNAL MEDICINE

## 2022-10-31 PROCEDURE — 700111 HCHG RX REV CODE 636 W/ 250 OVERRIDE (IP): Performed by: HOSPITALIST

## 2022-10-31 PROCEDURE — 0DB68ZX EXCISION OF STOMACH, VIA NATURAL OR ARTIFICIAL OPENING ENDOSCOPIC, DIAGNOSTIC: ICD-10-PCS | Performed by: INTERNAL MEDICINE

## 2022-10-31 PROCEDURE — 00813 ANES UPR LWR GI NDSC PX: CPT | Performed by: ANESTHESIOLOGY

## 2022-10-31 PROCEDURE — 85027 COMPLETE CBC AUTOMATED: CPT

## 2022-10-31 PROCEDURE — 94760 N-INVAS EAR/PLS OXIMETRY 1: CPT

## 2022-10-31 PROCEDURE — 700102 HCHG RX REV CODE 250 W/ 637 OVERRIDE(OP): Performed by: HOSPITALIST

## 2022-10-31 PROCEDURE — 700105 HCHG RX REV CODE 258: Performed by: INTERNAL MEDICINE

## 2022-10-31 PROCEDURE — 99233 SBSQ HOSP IP/OBS HIGH 50: CPT | Performed by: FAMILY MEDICINE

## 2022-10-31 PROCEDURE — 85018 HEMOGLOBIN: CPT

## 2022-10-31 PROCEDURE — 160208 HCHG ENDO MINUTES - EA ADDL 1 MIN LEVEL 4: Performed by: INTERNAL MEDICINE

## 2022-10-31 PROCEDURE — 80048 BASIC METABOLIC PNL TOTAL CA: CPT

## 2022-10-31 PROCEDURE — 36415 COLL VENOUS BLD VENIPUNCTURE: CPT

## 2022-10-31 PROCEDURE — 700101 HCHG RX REV CODE 250: Performed by: ANESTHESIOLOGY

## 2022-10-31 PROCEDURE — C9113 INJ PANTOPRAZOLE SODIUM, VIA: HCPCS | Performed by: HOSPITALIST

## 2022-10-31 PROCEDURE — 160002 HCHG RECOVERY MINUTES (STAT): Performed by: INTERNAL MEDICINE

## 2022-10-31 PROCEDURE — C1889 IMPLANT/INSERT DEVICE, NOC: HCPCS | Performed by: INTERNAL MEDICINE

## 2022-10-31 PROCEDURE — 770006 HCHG ROOM/CARE - MED/SURG/GYN SEMI*

## 2022-10-31 RX ORDER — DIPHENHYDRAMINE HYDROCHLORIDE 50 MG/ML
12.5 INJECTION INTRAMUSCULAR; INTRAVENOUS
Status: DISCONTINUED | OUTPATIENT
Start: 2022-10-31 | End: 2022-10-31 | Stop reason: HOSPADM

## 2022-10-31 RX ORDER — POTASSIUM CHLORIDE 7.45 MG/ML
10 INJECTION INTRAVENOUS
Status: ACTIVE | OUTPATIENT
Start: 2022-10-31 | End: 2022-10-31

## 2022-10-31 RX ORDER — SODIUM CHLORIDE, SODIUM LACTATE, POTASSIUM CHLORIDE, CALCIUM CHLORIDE 600; 310; 30; 20 MG/100ML; MG/100ML; MG/100ML; MG/100ML
INJECTION, SOLUTION INTRAVENOUS CONTINUOUS
Status: DISCONTINUED | OUTPATIENT
Start: 2022-10-31 | End: 2022-10-31

## 2022-10-31 RX ORDER — ONDANSETRON 2 MG/ML
4 INJECTION INTRAMUSCULAR; INTRAVENOUS
Status: DISCONTINUED | OUTPATIENT
Start: 2022-10-31 | End: 2022-10-31 | Stop reason: HOSPADM

## 2022-10-31 RX ORDER — METOPROLOL TARTRATE 1 MG/ML
1 INJECTION, SOLUTION INTRAVENOUS
Status: DISCONTINUED | OUTPATIENT
Start: 2022-10-31 | End: 2022-10-31 | Stop reason: HOSPADM

## 2022-10-31 RX ORDER — POTASSIUM CHLORIDE 7.45 MG/ML
10 INJECTION INTRAVENOUS
Status: COMPLETED | OUTPATIENT
Start: 2022-10-31 | End: 2022-10-31

## 2022-10-31 RX ORDER — HYDRALAZINE HYDROCHLORIDE 20 MG/ML
5 INJECTION INTRAMUSCULAR; INTRAVENOUS
Status: DISCONTINUED | OUTPATIENT
Start: 2022-10-31 | End: 2022-10-31 | Stop reason: HOSPADM

## 2022-10-31 RX ORDER — OXYCODONE HCL 5 MG/5 ML
10 SOLUTION, ORAL ORAL
Status: DISCONTINUED | OUTPATIENT
Start: 2022-10-31 | End: 2022-10-31 | Stop reason: HOSPADM

## 2022-10-31 RX ORDER — OXYCODONE HCL 5 MG/5 ML
5 SOLUTION, ORAL ORAL
Status: DISCONTINUED | OUTPATIENT
Start: 2022-10-31 | End: 2022-10-31 | Stop reason: HOSPADM

## 2022-10-31 RX ORDER — PHENYLEPHRINE HCL IN 0.9% NACL 0.5 MG/5ML
SYRINGE (ML) INTRAVENOUS PRN
Status: DISCONTINUED | OUTPATIENT
Start: 2022-10-31 | End: 2022-10-31 | Stop reason: SURG

## 2022-10-31 RX ORDER — IPRATROPIUM BROMIDE AND ALBUTEROL SULFATE 2.5; .5 MG/3ML; MG/3ML
3 SOLUTION RESPIRATORY (INHALATION)
Status: DISCONTINUED | OUTPATIENT
Start: 2022-10-31 | End: 2022-10-31 | Stop reason: HOSPADM

## 2022-10-31 RX ORDER — HALOPERIDOL 5 MG/ML
1 INJECTION INTRAMUSCULAR
Status: DISCONTINUED | OUTPATIENT
Start: 2022-10-31 | End: 2022-10-31 | Stop reason: HOSPADM

## 2022-10-31 RX ORDER — MEPERIDINE HYDROCHLORIDE 25 MG/ML
12.5 INJECTION INTRAMUSCULAR; INTRAVENOUS; SUBCUTANEOUS
Status: DISCONTINUED | OUTPATIENT
Start: 2022-10-31 | End: 2022-10-31 | Stop reason: HOSPADM

## 2022-10-31 RX ORDER — LIDOCAINE HYDROCHLORIDE 20 MG/ML
INJECTION, SOLUTION EPIDURAL; INFILTRATION; INTRACAUDAL; PERINEURAL PRN
Status: DISCONTINUED | OUTPATIENT
Start: 2022-10-31 | End: 2022-10-31 | Stop reason: SURG

## 2022-10-31 RX ADMIN — Medication 100 MCG: at 08:38

## 2022-10-31 RX ADMIN — SODIUM CHLORIDE, POTASSIUM CHLORIDE, SODIUM LACTATE AND CALCIUM CHLORIDE: 600; 310; 30; 20 INJECTION, SOLUTION INTRAVENOUS at 08:08

## 2022-10-31 RX ADMIN — SODIUM CHLORIDE, POTASSIUM CHLORIDE, SODIUM LACTATE AND CALCIUM CHLORIDE: 600; 310; 30; 20 INJECTION, SOLUTION INTRAVENOUS at 03:46

## 2022-10-31 RX ADMIN — LIDOCAINE HYDROCHLORIDE 100 MG: 20 INJECTION, SOLUTION EPIDURAL; INFILTRATION; INTRACAUDAL at 08:15

## 2022-10-31 RX ADMIN — ROSUVASTATIN 10 MG: 10 TABLET, FILM COATED ORAL at 20:39

## 2022-10-31 RX ADMIN — PANTOPRAZOLE SODIUM 40 MG: 40 INJECTION, POWDER, FOR SOLUTION INTRAVENOUS at 06:04

## 2022-10-31 RX ADMIN — CITALOPRAM HYDROBROMIDE 10 MG: 20 TABLET ORAL at 06:04

## 2022-10-31 RX ADMIN — PANTOPRAZOLE SODIUM 40 MG: 40 INJECTION, POWDER, FOR SOLUTION INTRAVENOUS at 17:49

## 2022-10-31 RX ADMIN — ACETAMINOPHEN 650 MG: 325 TABLET, FILM COATED ORAL at 19:33

## 2022-10-31 RX ADMIN — ACETAMINOPHEN 650 MG: 325 TABLET, FILM COATED ORAL at 11:16

## 2022-10-31 RX ADMIN — Medication 200 MCG: at 08:47

## 2022-10-31 RX ADMIN — POTASSIUM CHLORIDE 10 MEQ: 7.46 INJECTION, SOLUTION INTRAVENOUS at 12:27

## 2022-10-31 RX ADMIN — POTASSIUM CHLORIDE 10 MEQ: 7.46 INJECTION, SOLUTION INTRAVENOUS at 14:47

## 2022-10-31 RX ADMIN — PROPOFOL 130 MG: 10 INJECTION, EMULSION INTRAVENOUS at 08:15

## 2022-10-31 RX ADMIN — POTASSIUM CHLORIDE 10 MEQ: 7.46 INJECTION, SOLUTION INTRAVENOUS at 15:45

## 2022-10-31 RX ADMIN — PROPOFOL 50 MG: 10 INJECTION, EMULSION INTRAVENOUS at 08:25

## 2022-10-31 RX ADMIN — Medication 200 MCG: at 08:58

## 2022-10-31 RX ADMIN — FENOFIBRATE 134 MG: 134 CAPSULE ORAL at 20:39

## 2022-10-31 RX ADMIN — TRAZODONE HYDROCHLORIDE 50 MG: 50 TABLET ORAL at 20:39

## 2022-10-31 RX ADMIN — POTASSIUM CHLORIDE 10 MEQ: 7.46 INJECTION, SOLUTION INTRAVENOUS at 12:47

## 2022-10-31 ASSESSMENT — ENCOUNTER SYMPTOMS
COUGH: 0
CHILLS: 0
VOMITING: 0
BLOOD IN STOOL: 0
FEVER: 0
NAUSEA: 0
SHORTNESS OF BREATH: 0
DIARRHEA: 0
ABDOMINAL PAIN: 0

## 2022-10-31 ASSESSMENT — PAIN DESCRIPTION - PAIN TYPE
TYPE: ACUTE PAIN
TYPE: ACUTE PAIN

## 2022-10-31 ASSESSMENT — PAIN SCALES - GENERAL: PAIN_LEVEL: 0

## 2022-10-31 ASSESSMENT — FIBROSIS 4 INDEX: FIB4 SCORE: 1.36

## 2022-10-31 NOTE — CARE PLAN
The patient is Stable - Low risk of patient condition declining or worsening    Shift Goals  Clinical Goals: Discomfort, finish prep  Patient Goals: Finish prep    Progress made toward(s) clinical / shift goals:    Problem: Knowledge Deficit - Standard  Goal: Patient and family/care givers will demonstrate understanding of plan of care, disease process/condition, diagnostic tests and medications  Outcome: Progressing     Problem: Psychosocial  Goal: Patient's level of anxiety will decrease  Outcome: Progressing     Problem: Hemodynamics  Goal: Patient's hemodynamics, fluid balance and neurologic status will be stable or improve  Outcome: Progressing     Problem: Fluid Volume  Goal: Fluid volume balance will be maintained  Outcome: Progressing     Problem: Nutrition  Goal: Patient's nutritional and fluid intake will be adequate or improve  Outcome: Progressing  Note: Patient advanced to Full liquid diet, tolerating well.       Patient is not progressing towards the following goals: NA       DC instructions

## 2022-10-31 NOTE — ANESTHESIA TIME REPORT
Anesthesia Start and Stop Event Times     Date Time Event    10/31/2022 0746 Ready for Procedure     0808 Anesthesia Start     0906 Anesthesia Stop        Responsible Staff  10/31/22    Name Role Begin End    Rodrigo Hope M.D. Anesth 0808 0906        Overtime Reason:  no overtime (within assigned shift)    Comments:

## 2022-10-31 NOTE — OR NURSING
0901: Patient arrived from PACU via gurney.  No bleeding noted.     Sedation/Resp Status: Drowsy, eye opening to verbal.  Respirations spontaneous and non-labored.    HR 80s SR; VSS on 6L 02 via mask.     0910: Assisted patient on and off bedpan for urge to have BM.     0915: Alert, talking, denies pain/nausea. Tolerating sips of clears. Abd soft, non tender.     0919: Dr Negron at bedside for update    0930: Patient cont stable, no changes. A/OX3. No nausea/pain. States ready to return to room. Meets criteria to transfer back to floor room for cont care - diet full liquid.   Report to Haritha JARQUIN.

## 2022-10-31 NOTE — OR NURSING
0756: Patient allergies and NPO status verified, home medication reconciled, belongings secured.     Patient verbalizes understanding of pain scale, expected course of stay, and plan of care.     Surgical site verified with patient. IV access confirmed.     Patient also screens negative for Sleep Apnea per protocol.

## 2022-10-31 NOTE — PROGRESS NOTES
Telemetry Shift Summary    Rhythm SR/ST  HR Range   Ectopy none  Measurements 0.12/0.08/0.40        Normal Values  Rhythm SR  HR Range    Measurements 0.12-0.20 / 0.06-0.10  / 0.30-0.52

## 2022-10-31 NOTE — PROGRESS NOTES
Hospital Medicine Daily Progress Note    Date of Service  10/31/2022    Chief Complaint  Cindy Cruz is a 57 y.o. female admitted 10/29/2022 with syncope    Hospital Course  This is a 58yo female with a history of HTN, HLD, asthma, hip arthritis s/p QUINTIN earlier this year, phenylketonuria and obesity. She presented to hospital with syncope after having vomiting an diarrhea with initiation of doxycycline for a sinus infection. She does report having hematochezia, and was heme positive in the ER.    Interval Problem Update  10/30 - Pt states no blood in her stools since Friday, but hemoglobin continues to decline here, getting 1u PRBC for Hgb 7.2 with tachycardia today.  Denies ever having colonoscopy, states she may have had an EGD in the past, not in [place]. Contacted Dr Jerry and Rakesh, pt can have CLD today and likely scoping tomorrow. Of note, pt denies any urinary symptoms, will stop Rocephin.     10/31 - Pt transfused 1u PRBC yesterday for a hemoglobin sherita of 6.8, Hgb now 7.0, will give an additional unit today.  EGD this morning demonstrated a nonbleeding prepyloric ulcer and a duodenal ulcer with adherent clot, treated with epi and clipping. Due to high risk for recurrent bleed, pt to be advanced to FLD and monitor on IV PPIs with plans to advance diet tomorrow if stable.      I have discussed this patient's plan of care and discharge plan at IDT rounds today with Case Management, Nursing, Nursing leadership, and other members of the IDT team.    Consultants/Specialty  GI    Code Status  Full Code    Disposition  Patient is not medically cleared for discharge.   Anticipate discharge to to home with close outpatient follow-up.  I have placed the appropriate orders for post-discharge needs.    Review of Systems  Review of Systems   Constitutional:  Negative for chills and fever.   Respiratory:  Negative for cough and shortness of breath.    Cardiovascular:  Negative for chest pain and leg swelling.    Gastrointestinal:  Negative for abdominal pain, blood in stool, diarrhea, nausea and vomiting.   Genitourinary:  Negative for dysuria, frequency and urgency.   All other systems reviewed and are negative.     Physical Exam  Temp:  [36.9 °C (98.5 °F)-37.4 °C (99.4 °F)] 37 °C (98.6 °F)  Pulse:  [] 88  Resp:  [16-19] 18  BP: (103-125)/(51-68) 120/51  SpO2:  [95 %-100 %] 95 %    Physical Exam  Vitals and nursing note reviewed.   Constitutional:       Appearance: Normal appearance. She is not diaphoretic.   HENT:      Mouth/Throat:      Mouth: Mucous membranes are moist.   Cardiovascular:      Rate and Rhythm: Regular rhythm. Tachycardia present.   Pulmonary:      Effort: Pulmonary effort is normal. No respiratory distress.      Breath sounds: Normal breath sounds. No wheezing or rales.   Abdominal:      General: Abdomen is flat. Bowel sounds are normal. There is no distension.      Palpations: Abdomen is soft.      Tenderness: There is no abdominal tenderness. There is no guarding.   Musculoskeletal:         General: No swelling or tenderness.   Skin:     Coloration: Skin is not jaundiced or pale.   Neurological:      General: No focal deficit present.      Mental Status: She is alert and oriented to person, place, and time.   Psychiatric:         Mood and Affect: Mood normal.         Behavior: Behavior normal.       Fluids    Intake/Output Summary (Last 24 hours) at 10/31/2022 0658  Last data filed at 10/30/2022 1920  Gross per 24 hour   Intake 1203.49 ml   Output --   Net 1203.49 ml         Laboratory  Recent Labs     10/29/22  2042 10/30/22  0556 10/30/22  1132 10/30/22  1734 10/31/22  0056   WBC 13.0*  --   --   --  6.6   RBC 2.94*  --   --   --  2.60*   HEMOGLOBIN 8.4*   < > 6.8* 7.7* 7.3*   HEMATOCRIT 26.0*   < > 20.5* 23.6* 22.7*   MCV 88.4  --   --   --  87.3   MCH 28.6  --   --   --  28.1   MCHC 32.3*  --   --   --  32.2*   RDW 45.2  --   --   --  50.7*   PLATELETCT 363  --   --   --  217   MPV 11.5  --    --   --  11.9    < > = values in this interval not displayed.       Recent Labs     10/29/22  2042 10/31/22  0056   SODIUM 137 139   POTASSIUM 3.8 3.4*   CHLORIDE 102 107   CO2 23 21   GLUCOSE 190* 108*   BUN 38* 21   CREATININE 0.59 0.58   CALCIUM 9.0 8.7                     Imaging  EC-ECHOCARDIOGRAM COMPLETE W/O CONT   Final Result      DX-CHEST-PORTABLE (1 VIEW)   Final Result      1.  No acute cardiac or pulmonary abnormalities are identified.             Assessment/Plan  * Syncope  Assessment & Plan  -Syncope under the context of rectal bleeding - likely vasovagal due to n/v/d/GIB. Echo normal.  - BP 70/35 at home    SIRS (systemic inflammatory response syndrome) (HCC)  Assessment & Plan  Mildly dirty UA, but pt without urinary symptoms - stopped Rocephin, white count likely secondary to GIB and tachycardia from anemia   Procal negative      HLD (hyperlipidemia)  Assessment & Plan  -Continue rosuvastatin.    HTN (hypertension)  Assessment & Plan  -She takes lisinopril and hydrochlorothiazide.   - Hold home antihypertensives for now given her hypotension PTA and normotension here     Leukocytosis  Assessment & Plan  -WBC is 13.0 on admission.  -Patient is also tachycardic therefore meeting SIRS criteria but this appears to be secondary to blood loss   - Resolved - stress reaction    Elevated glucose  Assessment & Plan  -- A1c 5.2, likely stress reaction    Anemia due to acute blood loss  Assessment & Plan  -Under the context of rectal bleeding and melena  - s/p 1u PRBC yesterday with rise from 6.8 to 7.0, will give another 1u PRBC now   - Oral iron supplementation    Duodenal ulcer with hemorrhage- (present on admission)  Assessment & Plan  - Both BRB and melena at home PTA  -EGD with evidence of bleeding from a duodenal ulcer - epi and clipped, continue on PPI and FLD per GI   - Oral iron  - s/p 1u PRBC yesterday, giving another 1u today  - Pt takes excedrin at home, advised her to take only Tylenol as  needed        VTE prophylaxis: SCDs/TEDs    I have performed a physical exam and reviewed and updated ROS and Plan today (10/31/2022). In review of yesterday's note (10/30/2022), there are no changes except as documented above.

## 2022-10-31 NOTE — ANESTHESIA POSTPROCEDURE EVALUATION
Patient: Cindy Cruz    Procedure Summary     Date: 10/31/22 Room / Location:  ENDOSCOPIC ULTRASOUND ROOM / SURGERY [facility]    Anesthesia Start: 0808 Anesthesia Stop: 0906    Procedure: EGD, WITH COLONOSCOPY (Anus) Diagnosis: (rectal bleeding)    Surgeons: Vasile Negron M.D. Responsible Provider: Rodrigo Hope M.D.    Anesthesia Type: general ASA Status: 2 - Emergent          Final Anesthesia Type: general  Last vitals  BP   Blood Pressure: 120/51, NIBP: 144/77    Temp   37 °C (98.6 °F)    Pulse   88   Resp   18    SpO2   95 %      Anesthesia Post Evaluation    Patient location during evaluation: PACU  Patient participation: complete - patient participated  Level of consciousness: awake and alert  Pain score: 0    Airway patency: patent  Anesthetic complications: no  Cardiovascular status: hemodynamically stable  Respiratory status: acceptable  Hydration status: euvolemic    PONV: none          There were no known notable events for this encounter.     Nurse Pain Score: 3 (NPRS)

## 2022-10-31 NOTE — ANESTHESIA PREPROCEDURE EVALUATION
Case: [id number] Date/Time: 10/31/22 0745    Procedure: EGD, WITH COLONOSCOPY    Pre-op diagnosis: rectal bleeding    Location:  ENDOSCOPIC ULTRASOUND ROOM / SURGERY [facility]    Surgeons: Vasile Negron M.D.          Relevant Problems   CARDIAC   (positive) HTN (hypertension)       Physical Exam    Airway   Mallampati: III  TM distance: >3 FB  Neck ROM: full       Cardiovascular - normal exam  Rhythm: regular  Rate: normal  (-) murmur     Dental - normal exam           Pulmonary - normal exam  Breath sounds clear to auscultation     Abdominal    Neurological - normal exam                 Anesthesia Plan    ASA 2- EMERGENT   ASA physical status emergent criteria: acute hemorrhage    Plan - general       Airway plan will be mask          Induction: intravenous      Pertinent diagnostic labs and testing reviewed    Informed Consent:    Anesthetic plan and risks discussed with patient.    Use of blood products discussed with: patient whom consented to blood products.

## 2022-10-31 NOTE — OR SURGEON
Immediate Post OP Note    PreOp Diagnosis: Gi Bleeding      PostOp Diagnosis: Gastric and duodenal ulcer      Procedure(s):  EGD, WITH COLONOSCOPY - Wound Class: Clean Contaminated    Surgeon(s):  Vasile Negron M.D.    Anesthesiologist/Type of Anesthesia:  Anesthesiologist: Rodrigo Hope M.D./General    Surgical Staff:  Circulator: Ileana Jaffe R.N.  Endoscopy Technician: Beni Clements; Ayesha Garrett; Anel Zuniga    Specimens removed if any:  ID Type Source Tests Collected by Time Destination   A : for biopsy Tissue Gastric PATHOLOGY SPECIMEN Vasile Negron M.D. 10/31/2022  8:41 AM    B : sigmoid polyp for biopsy Tissue Colon - Sigmoid PATHOLOGY SPECIMEN Vasile Negron M.D. 10/31/2022  8:57 AM        Estimated Blood Loss: Minimal    Findings: 1) Small non-bleeding pre-pyloric gastric ulcer. Large deep, duodenal bulb ulcer with adherent clot. Injected with 5 CC Epi and one clip. Biopsied for H Pylori in stomach    Complications: none    Will advance diet to full liquids and follow for now. Needs IV PPI for now and close monitoring for recurrent bleeding as there is a 20% chance of this occurring in the next 3 days.    EMO        10/31/2022 9:03 AM Vasile Negron M.D.

## 2022-11-01 LAB
ANION GAP SERPL CALC-SCNC: 10 MMOL/L (ref 7–16)
BUN SERPL-MCNC: 11 MG/DL (ref 8–22)
CALCIUM SERPL-MCNC: 8.5 MG/DL (ref 8.4–10.2)
CHLORIDE SERPL-SCNC: 108 MMOL/L (ref 96–112)
CO2 SERPL-SCNC: 22 MMOL/L (ref 20–33)
CREAT SERPL-MCNC: 0.51 MG/DL (ref 0.5–1.4)
ERYTHROCYTE [DISTWIDTH] IN BLOOD BY AUTOMATED COUNT: 51.7 FL (ref 35.9–50)
GFR SERPLBLD CREATININE-BSD FMLA CKD-EPI: 109 ML/MIN/1.73 M 2
GLUCOSE SERPL-MCNC: 93 MG/DL (ref 65–99)
HCT VFR BLD AUTO: 21.7 % (ref 37–47)
HCT VFR BLD AUTO: 22 % (ref 37–47)
HCT VFR BLD AUTO: 22.3 % (ref 37–47)
HGB BLD-MCNC: 6.9 G/DL (ref 12–16)
HGB BLD-MCNC: 7 G/DL (ref 12–16)
HGB BLD-MCNC: 7.1 G/DL (ref 12–16)
MAGNESIUM SERPL-MCNC: 1.8 MG/DL (ref 1.5–2.5)
MCH RBC QN AUTO: 27.9 PG (ref 27–33)
MCHC RBC AUTO-ENTMCNC: 31.4 G/DL (ref 33.6–35)
MCV RBC AUTO: 88.8 FL (ref 81.4–97.8)
PLATELET # BLD AUTO: 164 K/UL (ref 164–446)
PMV BLD AUTO: 12 FL (ref 9–12.9)
POTASSIUM SERPL-SCNC: 3.5 MMOL/L (ref 3.6–5.5)
RBC # BLD AUTO: 2.51 M/UL (ref 4.2–5.4)
SODIUM SERPL-SCNC: 140 MMOL/L (ref 135–145)
WBC # BLD AUTO: 5.1 K/UL (ref 4.8–10.8)

## 2022-11-01 PROCEDURE — A9270 NON-COVERED ITEM OR SERVICE: HCPCS | Performed by: HOSPITALIST

## 2022-11-01 PROCEDURE — 85018 HEMOGLOBIN: CPT

## 2022-11-01 PROCEDURE — 83735 ASSAY OF MAGNESIUM: CPT

## 2022-11-01 PROCEDURE — 700102 HCHG RX REV CODE 250 W/ 637 OVERRIDE(OP): Performed by: HOSPITALIST

## 2022-11-01 PROCEDURE — 85014 HEMATOCRIT: CPT | Mod: 91

## 2022-11-01 PROCEDURE — 36415 COLL VENOUS BLD VENIPUNCTURE: CPT

## 2022-11-01 PROCEDURE — 700105 HCHG RX REV CODE 258: Performed by: FAMILY MEDICINE

## 2022-11-01 PROCEDURE — C9113 INJ PANTOPRAZOLE SODIUM, VIA: HCPCS | Performed by: HOSPITALIST

## 2022-11-01 PROCEDURE — 85027 COMPLETE CBC AUTOMATED: CPT

## 2022-11-01 PROCEDURE — 94760 N-INVAS EAR/PLS OXIMETRY 1: CPT

## 2022-11-01 PROCEDURE — 770006 HCHG ROOM/CARE - MED/SURG/GYN SEMI*

## 2022-11-01 PROCEDURE — 80048 BASIC METABOLIC PNL TOTAL CA: CPT

## 2022-11-01 PROCEDURE — 99233 SBSQ HOSP IP/OBS HIGH 50: CPT | Performed by: INTERNAL MEDICINE

## 2022-11-01 PROCEDURE — 700111 HCHG RX REV CODE 636 W/ 250 OVERRIDE (IP): Performed by: HOSPITALIST

## 2022-11-01 RX ADMIN — TRAZODONE HYDROCHLORIDE 50 MG: 50 TABLET ORAL at 20:02

## 2022-11-01 RX ADMIN — ACETAMINOPHEN 650 MG: 325 TABLET, FILM COATED ORAL at 08:24

## 2022-11-01 RX ADMIN — CITALOPRAM HYDROBROMIDE 10 MG: 20 TABLET ORAL at 05:08

## 2022-11-01 RX ADMIN — ROSUVASTATIN 10 MG: 10 TABLET, FILM COATED ORAL at 20:02

## 2022-11-01 RX ADMIN — FENOFIBRATE 134 MG: 134 CAPSULE ORAL at 20:02

## 2022-11-01 RX ADMIN — ACETAMINOPHEN 650 MG: 325 TABLET, FILM COATED ORAL at 16:50

## 2022-11-01 RX ADMIN — PANTOPRAZOLE SODIUM 40 MG: 40 INJECTION, POWDER, FOR SOLUTION INTRAVENOUS at 16:44

## 2022-11-01 RX ADMIN — SODIUM CHLORIDE, POTASSIUM CHLORIDE, SODIUM LACTATE AND CALCIUM CHLORIDE: 600; 310; 30; 20 INJECTION, SOLUTION INTRAVENOUS at 08:22

## 2022-11-01 RX ADMIN — PANTOPRAZOLE SODIUM 40 MG: 40 INJECTION, POWDER, FOR SOLUTION INTRAVENOUS at 05:08

## 2022-11-01 ASSESSMENT — ENCOUNTER SYMPTOMS
WEAKNESS: 0
BLURRED VISION: 0
HEADACHES: 0
PHOTOPHOBIA: 0
NERVOUS/ANXIOUS: 0
POLYDIPSIA: 0
ORTHOPNEA: 0
CONSTIPATION: 0
PALPITATIONS: 0
ABDOMINAL PAIN: 0
HEMOPTYSIS: 0
FEVER: 0
VOMITING: 0
INSOMNIA: 0
WHEEZING: 0
MEMORY LOSS: 0
FALLS: 1
SPEECH CHANGE: 0
SINUS PAIN: 0
EYE DISCHARGE: 0
DIZZINESS: 0
CHILLS: 0
HEADACHES: 1
FLANK PAIN: 0
COUGH: 0
SHORTNESS OF BREATH: 0
NAUSEA: 0
SPUTUM PRODUCTION: 1
CLAUDICATION: 0
BLOOD IN STOOL: 0
MYALGIAS: 0
EYE PAIN: 0
DEPRESSION: 0
DIARRHEA: 0
WEAKNESS: 1
SENSORY CHANGE: 0
HEARTBURN: 0

## 2022-11-01 ASSESSMENT — PAIN DESCRIPTION - PAIN TYPE
TYPE: ACUTE PAIN
TYPE: ACUTE PAIN

## 2022-11-01 NOTE — CARE PLAN
The patient is Stable - Low risk of patient condition declining or worsening    Shift Goals  Clinical Goals: Monitor H&H, monitor vitals, full liquid diet  Patient Goals: Rest, discharge home    Progress made toward(s) clinical / shift goals:    Problem: Knowledge Deficit - Standard  Goal: Patient and family/care givers will demonstrate understanding of plan of care, disease process/condition, diagnostic tests and medications  Outcome: Progressing     Problem: Psychosocial  Goal: Patient's level of anxiety will decrease  Outcome: Progressing     Problem: Discharge Barriers/Planning  Goal: Patient's continuum of care needs are met  Outcome: Progressing     Problem: Hemodynamics  Goal: Patient's hemodynamics, fluid balance and neurologic status will be stable or improve  Outcome: Progressing     Problem: Fluid Volume  Goal: Fluid volume balance will be maintained  Outcome: Progressing     Problem: Nutrition  Goal: Patient's nutritional and fluid intake will be adequate or improve  Outcome: Progressing  Goal: Enteral nutrition will be maintained or improve  Outcome: Progressing  Note: Advanced to full liquid diet yesterday, tolerates well     Problem: Mobility  Goal: Patient's capacity to carry out activities will improve  Outcome: Progressing     Problem: Pain - Standard  Goal: Alleviation of pain or a reduction in pain to the patient’s comfort goal  Outcome: Progressing  Note: Patient states no GI pain this AM, however PRN pain meds given for mild headache       Patient is not progressing towards the following goals: NA

## 2022-11-01 NOTE — HOSPITAL COURSE
This is a 56 yo female with a history of HTN, HLD, asthma, hip arthritis s/p QUINTIN earlier this year, phenylketonuria and obesity. She presented to hospital with syncope after having vomiting and diarrhea after initiation of doxycycline for a sinus infection. She does report having hematochezia, and was heme positive in the ER.  GI consulted and she had EGD which showed gastric and duodenal ulcers.  She received epinephrine injections and endoclip.  She has received transfusion on blood on 10/30 and 10/31.

## 2022-11-01 NOTE — PROGRESS NOTES
Gastroenterology Progress Note               Author:  JR Orellana Date & Time Created: 11/1/2022 10:52 AM       Patient ID:  Name:             Cindy Cruz  YOB: 1965  Age:                 57 y.o.  female  MRN:               [id number]      Referring Provider:  Shawanda Bess MD      Presenting Chief Complaint:  GI Bleed, Syncope      History of Present Illness:    This is a very pleasant 57 y.o. female seen in consultation for GI bleed and syncope.  The patient presented to the [facility] emergency room on 10/29/2022 with complaints of syncopal episode x2.  The patient states that over the last week or so she has been feeling more lethargic, tired, and nauseous.  However, last night she began having loose stools initially that appeared dark brown or black then transition to bright red over the last 2 days.  She got up to go to the bathroom last night to vomit, and during this became lightheaded and passed out for a few moments.  She took her blood pressure following this episode and this was low at 70/35 with a second syncopal episode and then she regained consciousness and blood pressure improved.  She denies abdominal pain, dysphagia, odynophagia, unintentional weight loss.  She states that she does not take NSAIDs regularly, but takes the occasional Excedrin for headaches.  Last dose of this was 1.5 weeks ago.  She had a hip replacement in May of this year and underwent cardiac evaluation with normal stress test and echocardiogram with clearance for the procedure at that time by cardiology.    Upon initial evaluation noted to have hemoglobin 8.4 which trended down to 7.2.  Her normal hemoglobin ranges around 12.  She does have mild WBC elevation.  She has never had a colonoscopy or endoscopy.    INTERVAL HISTORY:    11/1/22: No acute overnight events. States she is feeling better. Had a bowel movement, non bloody. Hgb: 7-->6.9 despite 1 unit of PRBC.     EGD  10/31/22: FINDINGS:    1.  Gastric prepyloric ulcer without stigmata.  2.  Duodenal ulcer with high risk stigmata in the form of the adherent clot   treated with epinephrine and clipping.  3.  Sigmoid colon polyp.         Review of Systems:  Review of Systems   Constitutional:  Positive for malaise/fatigue. Negative for chills and fever.   HENT:  Positive for congestion. Negative for sinus pain.    Eyes:  Negative for pain and discharge.   Respiratory:  Positive for sputum production. Negative for cough, hemoptysis and wheezing.    Cardiovascular:  Negative for chest pain, palpitations and orthopnea.   Gastrointestinal:  Negative for abdominal pain, blood in stool, melena, nausea and vomiting.   Genitourinary:  Negative for flank pain and hematuria.   Musculoskeletal:  Positive for falls and joint pain.   Skin:  Negative for itching and rash.   Neurological:  Positive for weakness and headaches. Negative for dizziness.   Endo/Heme/Allergies:  Negative for environmental allergies and polydipsia.   Psychiatric/Behavioral:  Negative for memory loss. The patient is not nervous/anxious.            Past Medical History:  Past Medical History:   Diagnosis Date    Arthritis     osteo LT hip    Asthma     Hypertension     Pain 05/17/2022    LT hip    PKU (phenylketonuria) ([facility], located within [facility])      Active Hospital Problems    Diagnosis     Duodenal ulcer with hemorrhage [K26.4]     Anemia due to acute blood loss [D62]     Elevated glucose [R73.09]     Leukocytosis [D72.829]     HTN (hypertension) [I10]     HLD (hyperlipidemia) [E78.5]     SIRS (systemic inflammatory response syndrome) ([facility], located within [facility]) [R65.10]     Syncope [R55]     Hematochezia [K92.1]          Past Surgical History:  Past Surgical History:   Procedure Laterality Date    PANENDOSCOPY N/A 10/31/2022    Procedure: EGD, WITH COLONOSCOPY;  Surgeon: Vasile Negron M.D.;  Location: SURGERY [facility];  Service: Gastroenterology    WI TOTAL HIP ARTHROPLASTY Left 5/19/2022    Procedure:  ARTHROPLASTY, HIP, TOTAL - ANY OTHER INDICATED PROCEDURES;  Surgeon: Arden Hamlin M.D.;  Location: SURGERY [facility];  Service: Orthopedics           Hospital Medications:  Current Facility-Administered Medications   Medication Dose Frequency Provider Last Rate Last Admin    citalopram (CeleXA) tablet 10 mg  10 mg DAILY Elizabeth Walls M.D.   10 mg at 11/01/22 0508    rosuvastatin (CRESTOR) tablet 10 mg  10 mg QHS Elizabeth Walls M.D.   10 mg at 10/31/22 2039    traZODone (DESYREL) tablet 50 mg  50 mg Nightly Elizabeth Walls M.D.   50 mg at 10/31/22 2039    acetaminophen (Tylenol) tablet 650 mg  650 mg Q6HRS PRN Elizabeth Walls M.D.   650 mg at 11/01/22 0824    ondansetron (ZOFRAN) syringe/vial injection 4 mg  4 mg Q4HRS PRN Elizabeth Walls M.D.        ondansetron (ZOFRAN ODT) dispertab 4 mg  4 mg Q4HRS PRN Elizabeth Walls M.D.        promethazine (PHENERGAN) tablet 12.5-25 mg  12.5-25 mg Q4HRS PRGIANCARLO Walls M.D.        promethazine (PHENERGAN) suppository 12.5-25 mg  12.5-25 mg Q4HRS PRN Elizabeth Walls M.D.        prochlorperazine (COMPAZINE) injection 5-10 mg  5-10 mg Q4HRS PRN Elizabeth Walls M.D.        senna-docusate (PERICOLACE or SENOKOT S) 8.6-50 MG per tablet 2 Tablet  2 Tablet BID Elizabeth Walls M.D.   2 Tablet at 10/30/22 1748    And    polyethylene glycol/lytes (MIRALAX) PACKET 1 Packet  1 Packet QDAY PRN Elizabeth Walls M.D.        And    magnesium hydroxide (MILK OF MAGNESIA) suspension 30 mL  30 mL QDAY PRN Elizabeth Walls M.D.        And    bisacodyl (DULCOLAX) suppository 10 mg  10 mg QDAY PRN Elizabeth Walls M.D.        [Held by provider] lisinopril (PRINIVIL) tablet 20 mg  20 mg Q DAY Elizabeth Walls M.D.   20 mg at 10/30/22 0609    [Held by provider] hydroCHLOROthiazide (HYDRODIURIL) tablet 12.5 mg  12.5 mg Q DAY Elizabeth Walls M.D.   12.5 mg at 10/30/22 0609     fenofibrate micronized (LOFIBRA) capsule 134 mg  134 mg QHS Elizabeth Walls M.D.   134 mg at 10/31/22 2039    pantoprazole (Protonix) injection 40 mg  40 mg BID Elizabeth Walls M.D.   40 mg at 11/01/22 0508    lactated ringers infusion   Continuous Shawanda Bess M.D. 65 mL/hr at 11/01/22 0826 Rate Verify at 11/01/22 0826    [Held by provider] ferrous sulfate tablet 325 mg  325 mg QDAY with Breakfast Shawanda Bess M.D.       Last reviewed on 10/31/2022  7:41 AM by Ileana Jaffe R.N.       Current Outpatient Medications:  Medications Prior to Admission   Medication Sig Dispense Refill Last Dose    beclomethasone HFA (QVAR REDIHALER) 40 MCG/ACT inhaler Inhale 1 Puff 2 times a day.   10/29/2022 at 0700    Cyanocobalamin ([employer] B12 GUMMIES PO) Take 2 Tablets by mouth every day.   10/27/2022 at AM    doxycycline (VIBRAMYCIN) 100 MG Tab Take 100 mg by mouth 2 times a day. Pt started on 10/25/2022 for 10 day course   10/29/2022 at 1900    albuterol 108 (90 Base) MCG/ACT Aero Soln inhalation aerosol Inhale 2 Puffs every 6 hours as needed for Shortness of Breath. 8.5 g 1 10/29/2022 at 1300    citalopram (CELEXA) 10 MG tablet Take 10 mg by mouth every day.   10/29/2022 at 0700    fenofibrate (TRIGLIDE) 160 MG tablet Take 160 mg by mouth at bedtime.   10/29/2022 at 2000    fluticasone (FLONASE) 50 MCG/ACT nasal spray Administer 1 Spray into affected nostril(S) as needed. Indications: Stuffy Nose   10/28/2022 at Unknown    lisinopril-hydrochlorothiazide (PRINZIDE) 20-12.5 MG per tablet Take 1 Tablet by mouth every day.   10/29/2022 at 0700    meloxicam (MOBIC) 7.5 MG Tab Take 7.5 mg by mouth as needed for Severe Pain.   > 3 weeks at Unknown    rosuvastatin (CRESTOR) 10 MG Tab Take 10 mg by mouth at bedtime.   10/29/2022 at 2000    traZODone (DESYREL) 50 MG Tab Take 50 mg by mouth every evening.   10/28/2022 at PM    docusate sodium (COLACE) 100 MG Cap Take 100 mg by mouth 2 times a day as needed for  "Constipation.   > 2.5 weeks at Unknown    ASPIRIN-ACETAMINOPHEN-CAFFEINE PO Take 2 Tablets by mouth every 6 hours as needed for Headache.   10/29/2022 at 1547    VITAMIN D PO Take 1 Tablet by mouth every day.   10/27/2022 at AM    Ascorbic Acid (VITAMIN C PO) Take 1 Tablet by mouth every day.   10/29/2022 at 0700    Multiple Vitamins-Minerals (ZINC PO) Take 1 Tablet by mouth every day.   10/27/2022 at AM    BIOTIN PO Take 1 Tablet by mouth every day.   10/27/2022 at AM         Medication Allergies:  Allergies   Allergen Reactions    Food Swelling     Pumpkin seeds, tongue swelling     Keflex Palpitations     Pt reports that she also gets a headache     Penicillins      Burning sensation    Dairy Food Allergy      Patient states Metabolic Syndrome         Family Medical History:  History reviewed. No pertinent family history.      Social History:  Social History     Socioeconomic History    Marital status:      Spouse name: Not on file    Number of children: Not on file    Years of education: Not on file    Highest education level: Not on file   Occupational History    Not on file   Tobacco Use    Smoking status: Never    Smokeless tobacco: Never   Vaping Use    Vaping Use: Never used   Substance and Sexual Activity    Alcohol use: Not Currently    Drug use: Not Currently    Sexual activity: Not on file   Other Topics Concern    Not on file   Social History Narrative    Not on file     Social Determinants of Health     Financial Resource Strain: Not on file   Food Insecurity: Not on file   Transportation Needs: Not on file   Physical Activity: Not on file   Stress: Not on file   Social Connections: Not on file   Intimate Partner Violence: Not on file   Housing Stability: Not on file         Vital signs:  Weight/BMI: Body mass index is 35.04 kg/m².  /70   Pulse 91   Temp 37.2 °C (98.9 °F) (Axillary)   Resp 16   Ht 1.575 m (5' 2\")   Wt 86.9 kg (191 lb 9.3 oz)   SpO2 96%   Vitals:    10/31/22 1954 " 11/01/22 0021 11/01/22 0455 11/01/22 0735   BP: 125/56 (!) 90/71 126/64 134/70   Pulse: 81 76 74 91   Resp: 16 16 16 16   Temp: 37.6 °C (99.6 °F) 36.8 °C (98.3 °F) 37.2 °C (99 °F) 37.2 °C (98.9 °F)   TempSrc: Oral Oral Oral Axillary   SpO2: 97% 95% 100% 96%   Weight:       Height:         Oxygen Therapy:  Pulse Oximetry: 96 %, O2 (LPM): 0, O2 Delivery Device: None - Room Air    Intake/Output Summary (Last 24 hours) at 11/1/2022 1052  Last data filed at 11/1/2022 0826  Gross per 24 hour   Intake 3820.63 ml   Output --   Net 3820.63 ml           Physical Exam:  Physical Exam  Vitals and nursing note reviewed.   Constitutional:       Appearance: She is obese. She is not ill-appearing.   HENT:      Right Ear: External ear normal.      Left Ear: External ear normal.      Nose: Nose normal. No congestion.      Mouth/Throat:      Pharynx: Oropharynx is clear. No oropharyngeal exudate.   Eyes:      General: No scleral icterus.     Extraocular Movements: Extraocular movements intact.      Pupils: Pupils are equal, round, and reactive to light.   Cardiovascular:      Rate and Rhythm: Normal rate and regular rhythm.      Pulses: Normal pulses.      Heart sounds: Normal heart sounds. No murmur heard.  Pulmonary:      Effort: Pulmonary effort is normal.      Breath sounds: Normal breath sounds. No wheezing or rales.   Abdominal:      General: Abdomen is flat. Bowel sounds are normal. There is no distension.      Palpations: Abdomen is soft.      Tenderness: There is no abdominal tenderness.   Musculoskeletal:      Cervical back: Neck supple.      Right lower leg: No edema.      Left lower leg: No edema.   Lymphadenopathy:      Cervical: No cervical adenopathy.   Skin:     General: Skin is warm and dry.      Coloration: Skin is pale.      Findings: Lesion (two areas of what appear to be possible spider angioma to upper chest) present.   Neurological:      General: No focal deficit present.      Mental Status: She is alert and  oriented to person, place, and time.   Psychiatric:         Thought Content: Thought content normal.         Judgment: Judgment normal.         Labs:  Recent Labs     10/29/22  2042 10/31/22  0056 11/01/22  0145   SODIUM 137 139 140   POTASSIUM 3.8 3.4* 3.5*   CHLORIDE 102 107 108   CO2 23 21 22   BUN 38* 21 11   CREATININE 0.59 0.58 0.51   MAGNESIUM  --   --  1.8   CALCIUM 9.0 8.7 8.5       Recent Labs     10/29/22  2042 10/31/22  0056 11/01/22  0145   ALTSGPT 12  --   --    ASTSGOT 18  --   --    ALKPHOSPHAT 51  --   --    TBILIRUBIN 0.4  --   --    LIPASE 48  --   --    GLUCOSE 190* 108* 93       Recent Labs     10/29/22  2042 10/31/22  0056 11/01/22  0145   WBC 13.0* 6.6 5.1   NEUTSPOLYS 83.50*  --   --    LYMPHOCYTES 11.70*  --   --    MONOCYTES 3.20  --   --    EOSINOPHILS 0.20  --   --    BASOPHILS 0.90  --   --    ASTSGOT 18  --   --    ALTSGPT 12  --   --    ALKPHOSPHAT 51  --   --    TBILIRUBIN 0.4  --   --        Recent Labs     10/29/22  2042 10/30/22  0556 10/30/22  1132 10/31/22  0056 10/31/22  0622 10/31/22  2128 11/01/22  0145 11/01/22  0907   RBC 2.94*  --   --  2.60*  --   --  2.51*  --    HEMOGLOBIN 8.4* 7.2*   < > 7.3*   < > 7.0* 7.0* 6.9*   HEMATOCRIT 26.0* 22.2*   < > 22.7*   < > 21.4* 22.3* 21.7*   PLATELETCT 363  --   --  217  --   --  164  --    IRON  --  40  --   --   --   --   --   --    FERRITIN 44.2  --   --   --   --   --   --   --    TOTIRONBC  --  328  --   --   --   --   --   --     < > = values in this interval not displayed.       Recent Results (from the past 24 hour(s))   HEMOGLOBIN AND HEMATOCRIT    Collection Time: 10/31/22 12:19 PM   Result Value Ref Range    Hemoglobin 6.9 (L) 12.0 - 16.0 g/dL    Hematocrit 21.5 (L) 37.0 - 47.0 %   HEMOGLOBIN AND HEMATOCRIT    Collection Time: 10/31/22  9:28 PM   Result Value Ref Range    Hemoglobin 7.0 (L) 12.0 - 16.0 g/dL    Hematocrit 21.4 (L) 37.0 - 47.0 %   CBC WITHOUT DIFFERENTIAL    Collection Time: 11/01/22  1:45 AM   Result Value Ref  Range    WBC 5.1 4.8 - 10.8 K/uL    RBC 2.51 (L) 4.20 - 5.40 M/uL    Hemoglobin 7.0 (L) 12.0 - 16.0 g/dL    Hematocrit 22.3 (L) 37.0 - 47.0 %    MCV 88.8 81.4 - 97.8 fL    MCH 27.9 27.0 - 33.0 pg    MCHC 31.4 (L) 33.6 - 35.0 g/dL    RDW 51.7 (H) 35.9 - 50.0 fL    Platelet Count 164 164 - 446 K/uL    MPV 12.0 9.0 - 12.9 fL   Basic Metabolic Panel    Collection Time: 11/01/22  1:45 AM   Result Value Ref Range    Sodium 140 135 - 145 mmol/L    Potassium 3.5 (L) 3.6 - 5.5 mmol/L    Chloride 108 96 - 112 mmol/L    Co2 22 20 - 33 mmol/L    Glucose 93 65 - 99 mg/dL    Bun 11 8 - 22 mg/dL    Creatinine 0.51 0.50 - 1.40 mg/dL    Calcium 8.5 8.4 - 10.2 mg/dL    Anion Gap 10.0 7.0 - 16.0   MAGNESIUM    Collection Time: 11/01/22  1:45 AM   Result Value Ref Range    Magnesium 1.8 1.5 - 2.5 mg/dL   ESTIMATED GFR    Collection Time: 11/01/22  1:45 AM   Result Value Ref Range    GFR (CKD-EPI) 109 >60 mL/min/1.73 m 2   HEMOGLOBIN AND HEMATOCRIT    Collection Time: 11/01/22  9:07 AM   Result Value Ref Range    Hemoglobin 6.9 (L) 12.0 - 16.0 g/dL    Hematocrit 21.7 (L) 37.0 - 47.0 %         Radiology Review:  EC-ECHOCARDIOGRAM COMPLETE W/O CONT   Final Result      DX-CHEST-PORTABLE (1 VIEW)   Final Result      1.  No acute cardiac or pulmonary abnormalities are identified.            MDM (Data Review):   -Records reviewed and summarized in current documentation  -I personally reviewed and interpreted the laboratory results  -I personally reviewed the radiology images        Medical Decision Making, by Problem:  Active Hospital Problems    Diagnosis     Duodenal ulcer with hemorrhage [K26.4]     Anemia due to acute blood loss [D62]     Elevated glucose [R73.09]     Leukocytosis [D72.829]     HTN (hypertension) [I10]     HLD (hyperlipidemia) [E78.5]     SIRS (systemic inflammatory response syndrome) (HCC) [R65.10]     Syncope [R55]     Hematochezia [K92.1]            Assessment/Recommendations:  57-year-old female with recent worsening  lethargy after sinus infection and treatment with doxycycline who developed what sounds to be dark stools then red stools and syncope over the last day.  Syncopal episodes seem to be related to vasovagal response and have resolved.  She had a cardiac evaluation prior to hip surgery earlier this year which was normal.  Differential diagnosis for GI bleeding does include peptic ulcer disease, malignancy, diverticular bleed, or other bleeding lesion.    Assessment:  -Melena and hematochezia-suggestive of upper or lower GI bleed  -Vasovagal syncope  -Acute posthemorrhagic anemia  -Leukocytosis-likely reactive  -Obesity  -Hyperlipidemia  -Hypertension    Plan:  - trend Hgb and transfuse >7  -Continue PPI IV BID  -GI soft diet  -If Hgb continues to trend down may need Tagged RBC vs CTA    Bryanna Barksdale, NIDHI.P.R.N.      Thank you for inviting me to participate in the care of this patient. Please do not hesitate to call GI consultants with additional questions/concerns or changes in the patient's clinical status at [phone].      Core Quality Measures   Reviewed items:  Labs, Medications and Radiology reports reviewed

## 2022-11-01 NOTE — PROGRESS NOTES
Received bedside report from MILKA Portillo, pt care assumed. VSS, pt assessment complete. Pt A&Ox4, mild c/o head pain at this time, PRN pain medications being given per MAR. POC discussed with pt and verbalizes no questions. Pt denies any additional needs at this time. Bed locked and in lowest position. Pt educated on fall risk and verbalized understanding, call light within reach, hourly rounding initiated.

## 2022-11-01 NOTE — PROGRESS NOTES
Monitor Summary:    Rhythm:  SR   Rate Range:  86-87  Ectopy: No ectopy    Measurements:  0.14/0.08/0.40    *Patient is now medical status. No more telemetry monitoring per active orders. Monitor tech notified.

## 2022-11-01 NOTE — PROGRESS NOTES
Hospital Medicine Daily Progress Note    Date of Service  11/1/2022    Chief Complaint  Cindy Cruz is a 57 y.o. female admitted 10/29/2022 with hematochezia and syncope.    Hospital Course  No notes on file    Interval Problem Update  11/1 Patient states she is overall feeling better, no episodes of hypotension documented.  She is not dizzy.  HGB is 7.0 this am and that is after receiving blood overnight for HGB of 6.9.  She is currently tolerating a full liquid diet.  I'm concerned that with the continued low HGB she may still be bleeding.  Will discuss further with GI.    I have discussed this patient's plan of care and discharge plan at IDT rounds today with Case Management, Nursing, Nursing leadership, and other members of the IDT team.    Consultants/Specialty  GI    Code Status  Full Code    Disposition  Patient is not medically cleared for discharge.   Anticipate discharge to to home with close outpatient follow-up.  I have placed the appropriate orders for post-discharge needs.    Review of Systems  Review of Systems   Constitutional:  Negative for chills and fever.   HENT:  Negative for congestion.    Eyes:  Negative for blurred vision and photophobia.   Respiratory:  Negative for cough and shortness of breath.    Cardiovascular:  Negative for chest pain, claudication and leg swelling.   Gastrointestinal:  Negative for abdominal pain, constipation, diarrhea, heartburn and vomiting.   Genitourinary:  Negative for dysuria and hematuria.   Musculoskeletal:  Negative for joint pain and myalgias.   Skin:  Negative for itching and rash.   Neurological:  Negative for dizziness, sensory change, speech change, weakness and headaches.   Psychiatric/Behavioral:  Negative for depression. The patient is not nervous/anxious and does not have insomnia.       Physical Exam  Temp:  [36.8 °C (98.2 °F)-37.6 °C (99.6 °F)] 37.2 °C (98.9 °F)  Pulse:  [74-91] 91  Resp:  [16] 16  BP: ()/(56-71) 134/70  SpO2:  [95  %-100 %] 96 %    Physical Exam  Vitals and nursing note reviewed.   Constitutional:       General: She is not in acute distress.     Appearance: Normal appearance. She is not ill-appearing.   HENT:      Head: Normocephalic and atraumatic.      Nose: Nose normal.   Cardiovascular:      Rate and Rhythm: Normal rate and regular rhythm.      Heart sounds: Normal heart sounds. No murmur heard.  Pulmonary:      Effort: Pulmonary effort is normal.      Breath sounds: Normal breath sounds.   Abdominal:      General: Bowel sounds are normal. There is no distension.      Palpations: Abdomen is soft.   Musculoskeletal:         General: No swelling or tenderness.      Cervical back: Neck supple.   Skin:     General: Skin is warm and dry.   Neurological:      General: No focal deficit present.      Mental Status: She is alert and oriented to person, place, and time.   Psychiatric:         Mood and Affect: Mood normal.       Fluids    Intake/Output Summary (Last 24 hours) at 11/1/2022 1018  Last data filed at 11/1/2022 0826  Gross per 24 hour   Intake 3820.63 ml   Output --   Net 3820.63 ml       Laboratory  Recent Labs     10/29/22  2042 10/30/22  0556 10/31/22  0056 10/31/22  0622 10/31/22  2128 11/01/22  0145 11/01/22  0907   WBC 13.0*  --  6.6  --   --  5.1  --    RBC 2.94*  --  2.60*  --   --  2.51*  --    HEMOGLOBIN 8.4*   < > 7.3*   < > 7.0* 7.0* 6.9*   HEMATOCRIT 26.0*   < > 22.7*   < > 21.4* 22.3* 21.7*   MCV 88.4  --  87.3  --   --  88.8  --    MCH 28.6  --  28.1  --   --  27.9  --    MCHC 32.3*  --  32.2*  --   --  31.4*  --    RDW 45.2  --  50.7*  --   --  51.7*  --    PLATELETCT 363  --  217  --   --  164  --    MPV 11.5  --  11.9  --   --  12.0  --     < > = values in this interval not displayed.     Recent Labs     10/29/22  2042 10/31/22  0056 11/01/22  0145   SODIUM 137 139 140   POTASSIUM 3.8 3.4* 3.5*   CHLORIDE 102 107 108   CO2 23 21 22   GLUCOSE 190* 108* 93   BUN 38* 21 11   CREATININE 0.59 0.58 0.51    CALCIUM 9.0 8.7 8.5                   Imaging  EC-ECHOCARDIOGRAM COMPLETE W/O CONT   Final Result      DX-CHEST-PORTABLE (1 VIEW)   Final Result      1.  No acute cardiac or pulmonary abnormalities are identified.           Assessment/Plan  * Anemia due to acute blood loss  Assessment & Plan  -Under the context of hematochezia and melena  - s/p 2u PRBC since admission, H/H is not increasing  - Oral iron supplementation    Syncope  Assessment & Plan  -Syncope under the context of rectal bleeding - likely vasovagal due to n/v/d/GIB. Echo normal.  - BP 70/35 at home    SIRS (systemic inflammatory response syndrome) (HCC)  Assessment & Plan  Mildly dirty UA, but pt without urinary symptoms - stopped Rocephin, white count likely secondary to GIB and tachycardia from anemia   Procal negative      HLD (hyperlipidemia)  Assessment & Plan  -Continue rosuvastatin.    HTN (hypertension)  Assessment & Plan  -She takes lisinopril and hydrochlorothiazide.   - Hold home antihypertensives for now given her hypotension PTA and normotension here     Leukocytosis  Assessment & Plan  -WBC is 13.0 on admission.  - Resolved - stress reaction    Elevated glucose  Assessment & Plan  -- A1c 5.2, likely stress reaction    Duodenal ulcer with hemorrhage- (present on admission)  Assessment & Plan  - Both BRB and melena at home PTA  -EGD with evidence of bleeding from a duodenal ulcer - epi and clipped, continue on PPI and FLD per GI   - Oral iron  - s/p 2u PRBC   - Pt takes excedrin at home, advised her to take only Tylenol as needed        VTE prophylaxis: SCDs/TEDs and pharmacologic prophylaxis contraindicated due to active GI bleeding    I have performed a physical exam and reviewed and updated ROS and Plan today (11/1/2022). In review of yesterday's note (10/31/2022), there are no changes except as documented above.

## 2022-11-01 NOTE — PROGRESS NOTES
Received bedside report from MILKA Youngblood, pt care assumed. VSS, pt assessment complete. Pt A&Ox4, POC discussed with pt and verbalizes no questions. Pt denies any additional needs at this time. Bed locked and in lowest position call light within reach, hourly rounding initiated.

## 2022-11-02 ENCOUNTER — APPOINTMENT (OUTPATIENT)
Dept: RADIOLOGY | Facility: MEDICAL CENTER | Age: 57
DRG: 378 | End: 2022-11-02
Attending: INTERNAL MEDICINE
Payer: COMMERCIAL

## 2022-11-02 LAB
ANION GAP SERPL CALC-SCNC: 11 MMOL/L (ref 7–16)
BUN SERPL-MCNC: 11 MG/DL (ref 8–22)
CALCIUM SERPL-MCNC: 8.4 MG/DL (ref 8.4–10.2)
CHLORIDE SERPL-SCNC: 109 MMOL/L (ref 96–112)
CO2 SERPL-SCNC: 21 MMOL/L (ref 20–33)
CREAT SERPL-MCNC: 0.42 MG/DL (ref 0.5–1.4)
ERYTHROCYTE [DISTWIDTH] IN BLOOD BY AUTOMATED COUNT: 51.3 FL (ref 35.9–50)
GFR SERPLBLD CREATININE-BSD FMLA CKD-EPI: 114 ML/MIN/1.73 M 2
GLUCOSE SERPL-MCNC: 90 MG/DL (ref 65–99)
HCT VFR BLD AUTO: 20.3 % (ref 37–47)
HGB BLD-MCNC: 6.4 G/DL (ref 12–16)
MCH RBC QN AUTO: 28.3 PG (ref 27–33)
MCHC RBC AUTO-ENTMCNC: 31.5 G/DL (ref 33.6–35)
MCV RBC AUTO: 89.8 FL (ref 81.4–97.8)
PLATELET # BLD AUTO: 153 K/UL (ref 164–446)
PMV BLD AUTO: 11.4 FL (ref 9–12.9)
POTASSIUM SERPL-SCNC: 3.5 MMOL/L (ref 3.6–5.5)
RBC # BLD AUTO: 2.26 M/UL (ref 4.2–5.4)
SODIUM SERPL-SCNC: 141 MMOL/L (ref 135–145)
WBC # BLD AUTO: 4.1 K/UL (ref 4.8–10.8)

## 2022-11-02 PROCEDURE — 36415 COLL VENOUS BLD VENIPUNCTURE: CPT

## 2022-11-02 PROCEDURE — 85027 COMPLETE CBC AUTOMATED: CPT

## 2022-11-02 PROCEDURE — 36430 TRANSFUSION BLD/BLD COMPNT: CPT

## 2022-11-02 PROCEDURE — C9113 INJ PANTOPRAZOLE SODIUM, VIA: HCPCS | Performed by: HOSPITALIST

## 2022-11-02 PROCEDURE — 770006 HCHG ROOM/CARE - MED/SURG/GYN SEMI*

## 2022-11-02 PROCEDURE — 700102 HCHG RX REV CODE 250 W/ 637 OVERRIDE(OP): Performed by: HOSPITALIST

## 2022-11-02 PROCEDURE — A9270 NON-COVERED ITEM OR SERVICE: HCPCS | Performed by: HOSPITALIST

## 2022-11-02 PROCEDURE — 86923 COMPATIBILITY TEST ELECTRIC: CPT | Mod: 91

## 2022-11-02 PROCEDURE — 700117 HCHG RX CONTRAST REV CODE 255: Performed by: INTERNAL MEDICINE

## 2022-11-02 PROCEDURE — 700111 HCHG RX REV CODE 636 W/ 250 OVERRIDE (IP): Performed by: HOSPITALIST

## 2022-11-02 PROCEDURE — 700105 HCHG RX REV CODE 258: Performed by: INTERNAL MEDICINE

## 2022-11-02 PROCEDURE — P9016 RBC LEUKOCYTES REDUCED: HCPCS

## 2022-11-02 PROCEDURE — 74174 CTA ABD&PLVS W/CONTRAST: CPT

## 2022-11-02 PROCEDURE — 700111 HCHG RX REV CODE 636 W/ 250 OVERRIDE (IP): Performed by: INTERNAL MEDICINE

## 2022-11-02 PROCEDURE — 700105 HCHG RX REV CODE 258: Performed by: FAMILY MEDICINE

## 2022-11-02 PROCEDURE — 99233 SBSQ HOSP IP/OBS HIGH 50: CPT | Performed by: INTERNAL MEDICINE

## 2022-11-02 PROCEDURE — 80048 BASIC METABOLIC PNL TOTAL CA: CPT

## 2022-11-02 RX ADMIN — SODIUM CHLORIDE 250 MG: 9 INJECTION, SOLUTION INTRAVENOUS at 17:40

## 2022-11-02 RX ADMIN — FENOFIBRATE 134 MG: 134 CAPSULE ORAL at 20:43

## 2022-11-02 RX ADMIN — IOHEXOL 100 ML: 350 INJECTION, SOLUTION INTRAVENOUS at 17:19

## 2022-11-02 RX ADMIN — CITALOPRAM HYDROBROMIDE 10 MG: 20 TABLET ORAL at 05:20

## 2022-11-02 RX ADMIN — SODIUM CHLORIDE, POTASSIUM CHLORIDE, SODIUM LACTATE AND CALCIUM CHLORIDE: 600; 310; 30; 20 INJECTION, SOLUTION INTRAVENOUS at 00:47

## 2022-11-02 RX ADMIN — SENNOSIDES AND DOCUSATE SODIUM 2 TABLET: 50; 8.6 TABLET ORAL at 17:20

## 2022-11-02 RX ADMIN — TRAZODONE HYDROCHLORIDE 50 MG: 50 TABLET ORAL at 20:43

## 2022-11-02 RX ADMIN — PANTOPRAZOLE SODIUM 40 MG: 40 INJECTION, POWDER, FOR SOLUTION INTRAVENOUS at 05:21

## 2022-11-02 RX ADMIN — ROSUVASTATIN 10 MG: 10 TABLET, FILM COATED ORAL at 20:43

## 2022-11-02 RX ADMIN — SODIUM CHLORIDE 250 MG: 9 INJECTION, SOLUTION INTRAVENOUS at 10:34

## 2022-11-02 RX ADMIN — PANTOPRAZOLE SODIUM 40 MG: 40 INJECTION, POWDER, FOR SOLUTION INTRAVENOUS at 17:21

## 2022-11-02 ASSESSMENT — ENCOUNTER SYMPTOMS
DIARRHEA: 0
PHOTOPHOBIA: 0
VOMITING: 0
FLANK PAIN: 0
NERVOUS/ANXIOUS: 0
NAUSEA: 0
HEADACHES: 1
PALPITATIONS: 0
HEARTBURN: 0
SPEECH CHANGE: 0
WEAKNESS: 1
SINUS PAIN: 0
COUGH: 0
ORTHOPNEA: 0
SENSORY CHANGE: 0
WHEEZING: 0
DIZZINESS: 0
FALLS: 1
FEVER: 0
POLYDIPSIA: 0
HEMOPTYSIS: 0
ABDOMINAL PAIN: 0
BLURRED VISION: 0
CLAUDICATION: 0
MYALGIAS: 0
MEMORY LOSS: 0
BLOOD IN STOOL: 0
SHORTNESS OF BREATH: 0
HEADACHES: 0
EYE DISCHARGE: 0
INSOMNIA: 0
CONSTIPATION: 0
DEPRESSION: 0
SPUTUM PRODUCTION: 1
CHILLS: 0
EYE PAIN: 0
WEAKNESS: 0

## 2022-11-02 ASSESSMENT — COGNITIVE AND FUNCTIONAL STATUS - GENERAL
CLIMB 3 TO 5 STEPS WITH RAILING: A LITTLE
SUGGESTED CMS G CODE MODIFIER DAILY ACTIVITY: CH
MOBILITY SCORE: 21
MOVING FROM LYING ON BACK TO SITTING ON SIDE OF FLAT BED: A LITTLE
SUGGESTED CMS G CODE MODIFIER MOBILITY: CJ
WALKING IN HOSPITAL ROOM: A LITTLE
DAILY ACTIVITIY SCORE: 24

## 2022-11-02 ASSESSMENT — PAIN DESCRIPTION - PAIN TYPE
TYPE: ACUTE PAIN
TYPE: ACUTE PAIN

## 2022-11-02 NOTE — CARE PLAN
The patient is Watcher - Medium risk of patient condition declining or worsening    Shift Goals  Clinical Goals: finish blood transfusion, give iron supplement, monitor H&H  Patient Goals: shower    Progress made toward(s) clinical / shift goals:  Unit of blood completed, iron supplement completed, patient able to shower. Patient CTA completed.       Problem: Knowledge Deficit - Standard  Goal: Patient and family/care givers will demonstrate understanding of plan of care, disease process/condition, diagnostic tests and medications  Outcome: Progressing     Problem: Psychosocial  Goal: Patient's level of anxiety will decrease  Outcome: Progressing       Patient is not progressing towards the following goals:

## 2022-11-02 NOTE — PROGRESS NOTES
OVERNIGHT HOSPITALIST:    Paged by nursing Staff.  Hemoglobin this morning is 6.4.  Patient is not having any active bleeding.  Heart rate is 96 bpm with blood pressure of 134/72.  EGD was done on 10/31 by Dr. Negron and found to have gastric and duodenal ulcer.  We will continue to closely monitor.

## 2022-11-02 NOTE — CARE PLAN
The patient is Stable - Low risk of patient condition declining or worsening    Shift Goals  Clinical Goals: Monitor H&H, rest  Patient Goals: Rest    Progress made toward(s) clinical / shift goals:    Problem: Knowledge Deficit - Standard  Goal: Patient and family/care givers will demonstrate understanding of plan of care, disease process/condition, diagnostic tests and medications  Outcome: Progressing     Problem: Psychosocial  Goal: Patient's level of anxiety will decrease  Outcome: Progressing     Problem: Communication  Goal: The ability to communicate needs accurately and effectively will improve  Outcome: Progressing     Problem: Discharge Barriers/Planning  Goal: Patient's continuum of care needs are met  Outcome: Progressing     Problem: Hemodynamics  Goal: Patient's hemodynamics, fluid balance and neurologic status will be stable or improve  Outcome: Progressing     Problem: Respiratory  Goal: Patient will achieve/maintain optimum respiratory ventilation and gas exchange  Outcome: Progressing

## 2022-11-02 NOTE — PROGRESS NOTES
Gastroenterology Progress Note               Author:  JR Orellana Date & Time Created: 11/2/2022 11:26 AM       Patient ID:  Name:             Cindy Cruz  YOB: 1965  Age:                 57 y.o.  female  MRN:               [id number]      Referring Provider:  Shawanda Bess MD      Presenting Chief Complaint:  GI Bleed, Syncope      History of Present Illness:    This is a very pleasant 57 y.o. female seen in consultation for GI bleed and syncope.  The patient presented to the [facility] emergency room on 10/29/2022 with complaints of syncopal episode x2.  The patient states that over the last week or so she has been feeling more lethargic, tired, and nauseous.  However, last night she began having loose stools initially that appeared dark brown or black then transition to bright red over the last 2 days.  She got up to go to the bathroom last night to vomit, and during this became lightheaded and passed out for a few moments.  She took her blood pressure following this episode and this was low at 70/35 with a second syncopal episode and then she regained consciousness and blood pressure improved.  She denies abdominal pain, dysphagia, odynophagia, unintentional weight loss.  She states that she does not take NSAIDs regularly, but takes the occasional Excedrin for headaches.  Last dose of this was 1.5 weeks ago.  She had a hip replacement in May of this year and underwent cardiac evaluation with normal stress test and echocardiogram with clearance for the procedure at that time by cardiology.    Upon initial evaluation noted to have hemoglobin 8.4 which trended down to 7.2.  Her normal hemoglobin ranges around 12.  She does have mild WBC elevation.  She has never had a colonoscopy or endoscopy.    INTERVAL HISTORY:    11/1/22: No acute overnight events. States she is feeling better. Had a bowel movement, non bloody. Hgb: 7-->6.9 despite 1 unit of PRBC.     EGD  10/31/22: FINDINGS:    1.  Gastric prepyloric ulcer without stigmata.  2.  Duodenal ulcer with high risk stigmata in the form of the adherent clot   treated with epinephrine and clipping.  3.  Sigmoid colon polyp.    11/2/22: Stable. No acute overnight events. Hgb trended down from 7.1-->6.4.  % sat: 12. Received 1 unit of PRBC and also getting IV iron. Bowel movements are brown. Denies overt GI bleed. No abdominal pain. CTA pending.     Review of Systems:  Review of Systems   Constitutional:  Positive for malaise/fatigue. Negative for chills and fever.   HENT:  Positive for congestion. Negative for sinus pain.    Eyes:  Negative for pain and discharge.   Respiratory:  Positive for sputum production. Negative for cough, hemoptysis and wheezing.    Cardiovascular:  Negative for chest pain, palpitations and orthopnea.   Gastrointestinal:  Negative for abdominal pain, blood in stool, melena, nausea and vomiting.   Genitourinary:  Negative for flank pain and hematuria.   Musculoskeletal:  Positive for falls and joint pain.   Skin:  Negative for itching and rash.   Neurological:  Positive for weakness and headaches. Negative for dizziness.   Endo/Heme/Allergies:  Negative for environmental allergies and polydipsia.   Psychiatric/Behavioral:  Negative for memory loss. The patient is not nervous/anxious.            Past Medical History:  Past Medical History:   Diagnosis Date    Arthritis     osteo LT hip    Asthma     Hypertension     Pain 05/17/2022    LT hip    PKU (phenylketonuria) (Formerly [facility])      Active Hospital Problems    Diagnosis     Duodenal ulcer with hemorrhage [K26.4]     Anemia due to acute blood loss [D62]     Elevated glucose [R73.09]     Leukocytosis [D72.829]     HTN (hypertension) [I10]     HLD (hyperlipidemia) [E78.5]     SIRS (systemic inflammatory response syndrome) (Formerly [facility]) [R65.10]     Syncope [R55]     Hematochezia [K92.1]          Past Surgical History:  Past Surgical History:   Procedure Laterality Date     PANENDOSCOPY N/A 10/31/2022    Procedure: EGD, WITH COLONOSCOPY;  Surgeon: Vasile Negron M.D.;  Location: SURGERY [facility];  Service: Gastroenterology    MT TOTAL HIP ARTHROPLASTY Left 5/19/2022    Procedure: ARTHROPLASTY, HIP, TOTAL - ANY OTHER INDICATED PROCEDURES;  Surgeon: Arden Hamlin M.D.;  Location: SURGERY [facility];  Service: Orthopedics           Hospital Medications:  Current Facility-Administered Medications   Medication Dose Frequency Provider Last Rate Last Admin    ferric gluconate complex (Ferrlecit) 250 mg in  mL IVPB  250 mg BID Andriy Baker M.D. 100 mL/hr at 11/02/22 1034 250 mg at 11/02/22 1034    citalopram (CeleXA) tablet 10 mg  10 mg DAILY Elizabeth Walls M.D.   10 mg at 11/02/22 0520    rosuvastatin (CRESTOR) tablet 10 mg  10 mg QHS Elizabeth Walls M.D.   10 mg at 11/01/22 2002    traZODone (DESYREL) tablet 50 mg  50 mg Nightly Elizabeth Walls M.D.   50 mg at 11/01/22 2002    acetaminophen (Tylenol) tablet 650 mg  650 mg Q6HRS PRN Elizabeth Walls M.D.   650 mg at 11/01/22 1650    ondansetron (ZOFRAN) syringe/vial injection 4 mg  4 mg Q4HRS PRN Elizabeth Walls M.D.        ondansetron (ZOFRAN ODT) dispertab 4 mg  4 mg Q4HRS PRN Elizabeth Walls M.D.        promethazine (PHENERGAN) tablet 12.5-25 mg  12.5-25 mg Q4HRS PRN Elizabeth Walls M.D.        promethazine (PHENERGAN) suppository 12.5-25 mg  12.5-25 mg Q4HRS PRN Elizabeth Walls M.D.        prochlorperazine (COMPAZINE) injection 5-10 mg  5-10 mg Q4HRS PRN Elizabeth Walls, M.D.        senna-docusate (PERICOLACE or SENOKOT S) 8.6-50 MG per tablet 2 Tablet  2 Tablet BID Elizabeth Walls M.D.   2 Tablet at 10/30/22 1748    And    polyethylene glycol/lytes (MIRALAX) PACKET 1 Packet  1 Packet QDAY PRGIANCARLO Walls M.D.        And    magnesium hydroxide (MILK OF MAGNESIA) suspension 30 mL  30 mL QDAY PRGIANCARLO Walls M.D.         And    bisacodyl (DULCOLAX) suppository 10 mg  10 mg QDAY PRN Elizabeth Walls M.D.        [Held by provider] lisinopril (PRINIVIL) tablet 20 mg  20 mg Q DAY Elizabeth Walls M.D.   20 mg at 10/30/22 0609    [Held by provider] hydroCHLOROthiazide (HYDRODIURIL) tablet 12.5 mg  12.5 mg Q DAY Elizabeth Walls M.D.   12.5 mg at 10/30/22 0609    fenofibrate micronized (LOFIBRA) capsule 134 mg  134 mg QHS Elizabeth Walls M.D.   134 mg at 11/01/22 2002    pantoprazole (Protonix) injection 40 mg  40 mg BID Elizabeth Walls M.D.   40 mg at 11/02/22 0521    lactated ringers infusion   Continuous Shawanda Bess M.D. 65 mL/hr at 11/02/22 0047 New Bag at 11/02/22 0047   Last reviewed on 10/31/2022  7:41 AM by Ileana Jaffe R.N.       Current Outpatient Medications:  Medications Prior to Admission   Medication Sig Dispense Refill Last Dose    beclomethasone HFA (QVAR REDIHALER) 40 MCG/ACT inhaler Inhale 1 Puff 2 times a day.   10/29/2022 at 0700    Cyanocobalamin ([employer] B12 GUMMIES PO) Take 2 Tablets by mouth every day.   10/27/2022 at AM    doxycycline (VIBRAMYCIN) 100 MG Tab Take 100 mg by mouth 2 times a day. Pt started on 10/25/2022 for 10 day course   10/29/2022 at 1900    albuterol 108 (90 Base) MCG/ACT Aero Soln inhalation aerosol Inhale 2 Puffs every 6 hours as needed for Shortness of Breath. 8.5 g 1 10/29/2022 at 1300    citalopram (CELEXA) 10 MG tablet Take 10 mg by mouth every day.   10/29/2022 at 0700    fenofibrate (TRIGLIDE) 160 MG tablet Take 160 mg by mouth at bedtime.   10/29/2022 at 2000    fluticasone (FLONASE) 50 MCG/ACT nasal spray Administer 1 Spray into affected nostril(S) as needed. Indications: Stuffy Nose   10/28/2022 at Unknown    lisinopril-hydrochlorothiazide (PRINZIDE) 20-12.5 MG per tablet Take 1 Tablet by mouth every day.   10/29/2022 at 0700    meloxicam (MOBIC) 7.5 MG Tab Take 7.5 mg by mouth as needed for Severe Pain.   > 3 weeks at Unknown    rosuvastatin  (CRESTOR) 10 MG Tab Take 10 mg by mouth at bedtime.   10/29/2022 at 2000    traZODone (DESYREL) 50 MG Tab Take 50 mg by mouth every evening.   10/28/2022 at PM    docusate sodium (COLACE) 100 MG Cap Take 100 mg by mouth 2 times a day as needed for Constipation.   > 2.5 weeks at Unknown    ASPIRIN-ACETAMINOPHEN-CAFFEINE PO Take 2 Tablets by mouth every 6 hours as needed for Headache.   10/29/2022 at 1547    VITAMIN D PO Take 1 Tablet by mouth every day.   10/27/2022 at AM    Ascorbic Acid (VITAMIN C PO) Take 1 Tablet by mouth every day.   10/29/2022 at 0700    Multiple Vitamins-Minerals (ZINC PO) Take 1 Tablet by mouth every day.   10/27/2022 at AM    BIOTIN PO Take 1 Tablet by mouth every day.   10/27/2022 at AM         Medication Allergies:  Allergies   Allergen Reactions    Food Swelling     Pumpkin seeds, tongue swelling     Keflex Palpitations     Pt reports that she also gets a headache     Penicillins      Burning sensation    Dairy Food Allergy      Patient states Metabolic Syndrome         Family Medical History:  History reviewed. No pertinent family history.      Social History:  Social History     Socioeconomic History    Marital status:      Spouse name: Not on file    Number of children: Not on file    Years of education: Not on file    Highest education level: Not on file   Occupational History    Not on file   Tobacco Use    Smoking status: Never    Smokeless tobacco: Never   Vaping Use    Vaping Use: Never used   Substance and Sexual Activity    Alcohol use: Not Currently    Drug use: Not Currently    Sexual activity: Not on file   Other Topics Concern    Not on file   Social History Narrative    Not on file     Social Determinants of Health     Financial Resource Strain: Not on file   Food Insecurity: Not on file   Transportation Needs: Not on file   Physical Activity: Not on file   Stress: Not on file   Social Connections: Not on file   Intimate Partner Violence: Not on file   Housing  "Stability: Not on file         Vital signs:  Weight/BMI: Body mass index is 35.04 kg/m².  /53   Pulse 86   Temp 36.8 °C (98.3 °F) (Oral)   Resp 18   Ht 1.575 m (5' 2\")   Wt 86.9 kg (191 lb 9.3 oz)   SpO2 94%   Vitals:    11/02/22 0446 11/02/22 0632 11/02/22 0655 11/02/22 0935   BP: 134/72 135/70 128/66 110/53   Pulse: 96 86 77 86   Resp: 20 16 16 18   Temp: 37.1 °C (98.7 °F) 36.9 °C (98.5 °F) 36.9 °C (98.4 °F) 36.8 °C (98.3 °F)   TempSrc: Temporal  Axillary Oral   SpO2: 93% 97% 96% 94%   Weight:       Height:         Oxygen Therapy:  Pulse Oximetry: 94 %, O2 (LPM): 3, O2 Delivery Device: Nasal Cannula    Intake/Output Summary (Last 24 hours) at 11/2/2022 1126  Last data filed at 11/2/2022 0935  Gross per 24 hour   Intake 708.75 ml   Output --   Net 708.75 ml           Physical Exam:  Physical Exam  Vitals and nursing note reviewed.   Constitutional:       Appearance: She is obese. She is not ill-appearing.   HENT:      Right Ear: External ear normal.      Left Ear: External ear normal.      Nose: Nose normal. No congestion.      Mouth/Throat:      Pharynx: Oropharynx is clear. No oropharyngeal exudate.   Eyes:      General: No scleral icterus.     Extraocular Movements: Extraocular movements intact.      Pupils: Pupils are equal, round, and reactive to light.   Cardiovascular:      Rate and Rhythm: Normal rate and regular rhythm.      Pulses: Normal pulses.      Heart sounds: Normal heart sounds. No murmur heard.  Pulmonary:      Effort: Pulmonary effort is normal.      Breath sounds: Normal breath sounds. No wheezing or rales.   Abdominal:      General: Abdomen is flat. Bowel sounds are normal. There is no distension.      Palpations: Abdomen is soft.      Tenderness: There is no abdominal tenderness.   Musculoskeletal:      Cervical back: Neck supple.      Right lower leg: No edema.      Left lower leg: No edema.   Lymphadenopathy:      Cervical: No cervical adenopathy.   Skin:     General: Skin is " warm and dry.      Coloration: Skin is pale.      Findings: No lesion (two areas of what appear to be possible spider angioma to upper chest).   Neurological:      General: No focal deficit present.      Mental Status: She is alert and oriented to person, place, and time.   Psychiatric:         Thought Content: Thought content normal.         Judgment: Judgment normal.         Labs:  Recent Labs     10/31/22  0056 11/01/22  0145 11/02/22  0437   SODIUM 139 140 141   POTASSIUM 3.4* 3.5* 3.5*   CHLORIDE 107 108 109   CO2 21 22 21   BUN 21 11 11   CREATININE 0.58 0.51 0.42*   MAGNESIUM  --  1.8  --    CALCIUM 8.7 8.5 8.4       Recent Labs     10/31/22  0056 11/01/22  0145 11/02/22  0437   GLUCOSE 108* 93 90       Recent Labs     10/31/22  0056 11/01/22  0145 11/02/22  0437   WBC 6.6 5.1 4.1*       Recent Labs     10/31/22  0056 10/31/22  0622 11/01/22  0145 11/01/22  0907 11/01/22  1509 11/02/22  0437   RBC 2.60*  --  2.51*  --   --  2.26*   HEMOGLOBIN 7.3*   < > 7.0* 6.9* 7.1* 6.4*   HEMATOCRIT 22.7*   < > 22.3* 21.7* 22.0* 20.3*   PLATELETCT 217  --  164  --   --  153*    < > = values in this interval not displayed.       Recent Results (from the past 24 hour(s))   HEMOGLOBIN AND HEMATOCRIT    Collection Time: 11/01/22  3:09 PM   Result Value Ref Range    Hemoglobin 7.1 (L) 12.0 - 16.0 g/dL    Hematocrit 22.0 (L) 37.0 - 47.0 %   CBC WITHOUT DIFFERENTIAL    Collection Time: 11/02/22  4:37 AM   Result Value Ref Range    WBC 4.1 (L) 4.8 - 10.8 K/uL    RBC 2.26 (L) 4.20 - 5.40 M/uL    Hemoglobin 6.4 (L) 12.0 - 16.0 g/dL    Hematocrit 20.3 (L) 37.0 - 47.0 %    MCV 89.8 81.4 - 97.8 fL    MCH 28.3 27.0 - 33.0 pg    MCHC 31.5 (L) 33.6 - 35.0 g/dL    RDW 51.3 (H) 35.9 - 50.0 fL    Platelet Count 153 (L) 164 - 446 K/uL    MPV 11.4 9.0 - 12.9 fL   Basic Metabolic Panel    Collection Time: 11/02/22  4:37 AM   Result Value Ref Range    Sodium 141 135 - 145 mmol/L    Potassium 3.5 (L) 3.6 - 5.5 mmol/L    Chloride 109 96 - 112  mmol/L    Co2 21 20 - 33 mmol/L    Glucose 90 65 - 99 mg/dL    Bun 11 8 - 22 mg/dL    Creatinine 0.42 (L) 0.50 - 1.40 mg/dL    Calcium 8.4 8.4 - 10.2 mg/dL    Anion Gap 11.0 7.0 - 16.0   ESTIMATED GFR    Collection Time: 11/02/22  4:37 AM   Result Value Ref Range    GFR (CKD-EPI) 114 >60 mL/min/1.73 m 2         Radiology Review:  EC-ECHOCARDIOGRAM COMPLETE W/O CONT   Final Result      DX-CHEST-PORTABLE (1 VIEW)   Final Result      1.  No acute cardiac or pulmonary abnormalities are identified.      CTA ABDOMEN PELVIS W & W/O POST PROCESS    (Results Pending)         MDM (Data Review):   -Records reviewed and summarized in current documentation  -I personally reviewed and interpreted the laboratory results  -I personally reviewed the radiology images        Medical Decision Making, by Problem:  Active Hospital Problems    Diagnosis     Duodenal ulcer with hemorrhage [K26.4]     Anemia due to acute blood loss [D62]     Elevated glucose [R73.09]     Leukocytosis [D72.829]     HTN (hypertension) [I10]     HLD (hyperlipidemia) [E78.5]     SIRS (systemic inflammatory response syndrome) (HCC) [R65.10]     Syncope [R55]     Hematochezia [K92.1]            Assessment/Recommendations:  57-year-old female with recent worsening lethargy after sinus infection and treatment with doxycycline who developed what sounds to be dark stools then red stools and syncope over the last day.  Syncopal episodes seem to be related to vasovagal response and have resolved.  She had a cardiac evaluation prior to hip surgery earlier this year which was normal.  Differential diagnosis for GI bleeding does include peptic ulcer disease, malignancy, diverticular bleed, or other bleeding lesion.    Assessment:  -Melena and hematochezia-suggestive of upper or lower GI bleed  -Vasovagal syncope  -Acute posthemorrhagic anemia  -Leukocytosis-likely reactive  -Obesity  -Hyperlipidemia  -Hypertension    Plan:  - trend Hgb and transfuse >7  -Continue PPI IV  BID  -GI soft diet  -CTA abdomen/pelvis with findings of active bleeding recommend IR. If no active hemorrhage, may need to repeat EGD to re-evaluate duodenal ulcer    Bryanna Barksdale, NIDHI.[email or web address].N.      Thank you for inviting me to participate in the care of this patient. Please do not hesitate to call GI consultants with additional questions/concerns or changes in the patient's clinical status at [phone].      Core Quality Measures   Reviewed items:  Labs, Medications and Radiology reports reviewed

## 2022-11-02 NOTE — PROGRESS NOTES
Hospital Medicine Daily Progress Note    Date of Service  11/2/2022    Chief Complaint  Cindy Cruz is a 57 y.o. female admitted 10/29/2022 with hematochezia and syncope.    Hospital Course  This is a 56 yo female with a history of HTN, HLD, asthma, hip arthritis s/p QUINTIN earlier this year, phenylketonuria and obesity. She presented to hospital with syncope after having vomiting and diarrhea after initiation of doxycycline for a sinus infection. She does report having hematochezia, and was heme positive in the ER.  GI consulted and she had EGD which showed gastric and duodenal ulcers.  She received epinephrine injections and endoclip.  She has received transfusion on blood on 10/30 and 10/31.    Interval Problem Update  11/1 Patient states she is overall feeling better, no episodes of hypotension documented.  She is not dizzy.  HGB is 7.0 this am and that is after receiving blood overnight for HGB of 6.9.  She is currently tolerating a full liquid diet.  I'm concerned that with the continued low HGB she may still be bleeding.  Will discuss further with GI.  11/2 Patient continues to feel well despite her dropping HGB which is down to 6.4 this am.  I've ordered 1 unit of blood to be transfused along with IV iron replacement.  CTA Abdomen/pelvis pending to see if any other source of bleeding identified.  Patient had a normal brown bowel movement yesterday without any signs of bleeding.    I have discussed this patient's plan of care and discharge plan at IDT rounds today with Case Management, Nursing, Nursing leadership, and other members of the IDT team.    Consultants/Specialty  GI    Code Status  Full Code    Disposition  Patient is not medically cleared for discharge.   Anticipate discharge to to home with close outpatient follow-up.  I have placed the appropriate orders for post-discharge needs.    Review of Systems  Review of Systems   Constitutional:  Negative for chills and fever.   HENT:  Negative  for congestion.    Eyes:  Negative for blurred vision and photophobia.   Respiratory:  Negative for cough and shortness of breath.    Cardiovascular:  Negative for chest pain, claudication and leg swelling.   Gastrointestinal:  Negative for abdominal pain, constipation, diarrhea, heartburn and vomiting.   Genitourinary:  Negative for dysuria and hematuria.   Musculoskeletal:  Negative for joint pain and myalgias.   Skin:  Negative for itching and rash.   Neurological:  Negative for dizziness, sensory change, speech change, weakness and headaches.   Psychiatric/Behavioral:  Negative for depression. The patient is not nervous/anxious and does not have insomnia.       Physical Exam  Temp:  [36.2 °C (97.2 °F)-37.2 °C (98.9 °F)] 36.8 °C (98.3 °F)  Pulse:  [72-96] 86  Resp:  [16-20] 18  BP: (110-135)/(53-75) 110/53  SpO2:  [93 %-97 %] 94 %    Physical Exam  Vitals and nursing note reviewed.   Constitutional:       General: She is not in acute distress.     Appearance: Normal appearance. She is not ill-appearing.   HENT:      Head: Normocephalic and atraumatic.      Nose: Nose normal.   Cardiovascular:      Rate and Rhythm: Normal rate and regular rhythm.      Heart sounds: Normal heart sounds. No murmur heard.  Pulmonary:      Effort: Pulmonary effort is normal.      Breath sounds: Normal breath sounds.   Abdominal:      General: Bowel sounds are normal. There is no distension.      Palpations: Abdomen is soft.   Musculoskeletal:         General: No swelling or tenderness.      Cervical back: Neck supple.   Skin:     General: Skin is warm and dry.   Neurological:      General: No focal deficit present.      Mental Status: She is alert and oriented to person, place, and time.   Psychiatric:         Mood and Affect: Mood normal.       Fluids    Intake/Output Summary (Last 24 hours) at 11/2/2022 1042  Last data filed at 11/2/2022 0935  Gross per 24 hour   Intake 708.75 ml   Output --   Net 708.75 ml          Laboratory  Recent Labs     10/31/22  0056 10/31/22  0622 11/01/22  0145 11/01/22  0907 11/01/22  1509 11/02/22  0437   WBC 6.6  --  5.1  --   --  4.1*   RBC 2.60*  --  2.51*  --   --  2.26*   HEMOGLOBIN 7.3*   < > 7.0* 6.9* 7.1* 6.4*   HEMATOCRIT 22.7*   < > 22.3* 21.7* 22.0* 20.3*   MCV 87.3  --  88.8  --   --  89.8   MCH 28.1  --  27.9  --   --  28.3   MCHC 32.2*  --  31.4*  --   --  31.5*   RDW 50.7*  --  51.7*  --   --  51.3*   PLATELETCT 217  --  164  --   --  153*   MPV 11.9  --  12.0  --   --  11.4    < > = values in this interval not displayed.       Recent Labs     10/31/22  0056 11/01/22  0145 11/02/22  0437   SODIUM 139 140 141   POTASSIUM 3.4* 3.5* 3.5*   CHLORIDE 107 108 109   CO2 21 22 21   GLUCOSE 108* 93 90   BUN 21 11 11   CREATININE 0.58 0.51 0.42*   CALCIUM 8.7 8.5 8.4                     Imaging  EC-ECHOCARDIOGRAM COMPLETE W/O CONT   Final Result      DX-CHEST-PORTABLE (1 VIEW)   Final Result      1.  No acute cardiac or pulmonary abnormalities are identified.      CTA ABDOMEN PELVIS W & W/O POST PROCESS    (Results Pending)          Assessment/Plan  * Anemia due to acute blood loss  Assessment & Plan  -Under the context of hematochezia and melena  - s/p 2u PRBC since admission, H/H is not increasing  - HGB 6.4 this am  - transfuse another unit and CTA abdomen/pelvis for further evaluation  - IV iron replacement given significant iron deficiency      Syncope  Assessment & Plan  -Syncope under the context of rectal bleeding - likely vasovagal due to n/v/d/GIB. Echo normal.  - BP 70/35 at home    SIRS (systemic inflammatory response syndrome) (HCC)  Assessment & Plan  Mildly dirty UA, but pt without urinary symptoms - stopped Rocephin, white count likely secondary to GIB and tachycardia from anemia   Procal negative      HLD (hyperlipidemia)  Assessment & Plan  -Continue rosuvastatin.    HTN (hypertension)  Assessment & Plan  -She takes lisinopril and hydrochlorothiazide.   - Hold home  antihypertensives for now given her hypotension PTA and normotension here     Leukocytosis  Assessment & Plan  -WBC is 13.0 on admission.  - Resolved - stress reaction    Elevated glucose  Assessment & Plan  -- A1c 5.2, likely stress reaction    Duodenal ulcer with hemorrhage- (present on admission)  Assessment & Plan  - Both BRB and melena at home PTA  -EGD with evidence of bleeding from a duodenal ulcer - epi and clipped, continue on PPI and FLD per GI   - Oral iron  - s/p 2u PRBC   - Pt takes excedrin at home, advised her to take only Tylenol as needed          VTE prophylaxis: SCDs/TEDs and pharmacologic prophylaxis contraindicated due to active GI bleeding    I have performed a physical exam and reviewed and updated ROS and Plan today (11/2/2022). In review of yesterday's note (11/1/2022), there are no changes except as documented above.

## 2022-11-02 NOTE — PROGRESS NOTES
Received bedside report from RN Carole pt care assumed. VSS, pt assessment complete. Pt A&Ox4, POC discussed with pt and verbalizes no questions. Pt denies any additional needs at this time. Bed locked and in lowest position, bed alarm not active. Pt educated on fall risk and verbalized understanding, call light within reach, hourly rounding initiated.

## 2022-11-03 ENCOUNTER — ANESTHESIA (OUTPATIENT)
Dept: SURGERY | Facility: MEDICAL CENTER | Age: 57
DRG: 378 | End: 2022-11-03
Payer: COMMERCIAL

## 2022-11-03 ENCOUNTER — ANESTHESIA EVENT (OUTPATIENT)
Dept: SURGERY | Facility: MEDICAL CENTER | Age: 57
DRG: 378 | End: 2022-11-03
Payer: COMMERCIAL

## 2022-11-03 LAB
ABO GROUP BLD: NORMAL
ANION GAP SERPL CALC-SCNC: 10 MMOL/L (ref 7–16)
BARCODED ABORH UBTYP: 5100
BARCODED PRD CODE UBPRD: NORMAL
BARCODED UNIT NUM UBUNT: NORMAL
BLD GP AB SCN SERPL QL: NORMAL
BUN SERPL-MCNC: 14 MG/DL (ref 8–22)
CALCIUM SERPL-MCNC: 8.4 MG/DL (ref 8.4–10.2)
CHLORIDE SERPL-SCNC: 109 MMOL/L (ref 96–112)
CO2 SERPL-SCNC: 23 MMOL/L (ref 20–33)
COMPONENT R 8504R: NORMAL
CREAT SERPL-MCNC: 0.43 MG/DL (ref 0.5–1.4)
ERYTHROCYTE [DISTWIDTH] IN BLOOD BY AUTOMATED COUNT: 50.8 FL (ref 35.9–50)
GFR SERPLBLD CREATININE-BSD FMLA CKD-EPI: 113 ML/MIN/1.73 M 2
GLUCOSE SERPL-MCNC: 95 MG/DL (ref 65–99)
HCT VFR BLD AUTO: 21.8 % (ref 37–47)
HGB BLD-MCNC: 6.9 G/DL (ref 12–16)
MCH RBC QN AUTO: 28.4 PG (ref 27–33)
MCHC RBC AUTO-ENTMCNC: 31.7 G/DL (ref 33.6–35)
MCV RBC AUTO: 89.7 FL (ref 81.4–97.8)
PLATELET # BLD AUTO: 187 K/UL (ref 164–446)
PMV BLD AUTO: 12 FL (ref 9–12.9)
POTASSIUM SERPL-SCNC: 3.5 MMOL/L (ref 3.6–5.5)
PRODUCT TYPE UPROD: NORMAL
RBC # BLD AUTO: 2.43 M/UL (ref 4.2–5.4)
RH BLD: NORMAL
SODIUM SERPL-SCNC: 142 MMOL/L (ref 135–145)
UNIT STATUS USTAT: NORMAL
WBC # BLD AUTO: 5.2 K/UL (ref 4.8–10.8)

## 2022-11-03 PROCEDURE — 700111 HCHG RX REV CODE 636 W/ 250 OVERRIDE (IP): Performed by: INTERNAL MEDICINE

## 2022-11-03 PROCEDURE — 00731 ANES UPR GI NDSC PX NOS: CPT | Performed by: ANESTHESIOLOGY

## 2022-11-03 PROCEDURE — P9016 RBC LEUKOCYTES REDUCED: HCPCS

## 2022-11-03 PROCEDURE — 160009 HCHG ANES TIME/MIN: Performed by: INTERNAL MEDICINE

## 2022-11-03 PROCEDURE — 0DJ08ZZ INSPECTION OF UPPER INTESTINAL TRACT, VIA NATURAL OR ARTIFICIAL OPENING ENDOSCOPIC: ICD-10-PCS | Performed by: INTERNAL MEDICINE

## 2022-11-03 PROCEDURE — C9113 INJ PANTOPRAZOLE SODIUM, VIA: HCPCS | Performed by: HOSPITALIST

## 2022-11-03 PROCEDURE — A9270 NON-COVERED ITEM OR SERVICE: HCPCS | Performed by: HOSPITALIST

## 2022-11-03 PROCEDURE — 770006 HCHG ROOM/CARE - MED/SURG/GYN SEMI*

## 2022-11-03 PROCEDURE — 86900 BLOOD TYPING SEROLOGIC ABO: CPT

## 2022-11-03 PROCEDURE — 86901 BLOOD TYPING SEROLOGIC RH(D): CPT

## 2022-11-03 PROCEDURE — 85027 COMPLETE CBC AUTOMATED: CPT

## 2022-11-03 PROCEDURE — 36430 TRANSFUSION BLD/BLD COMPNT: CPT

## 2022-11-03 PROCEDURE — 160048 HCHG OR STATISTICAL LEVEL 1-5: Performed by: INTERNAL MEDICINE

## 2022-11-03 PROCEDURE — 700101 HCHG RX REV CODE 250: Performed by: ANESTHESIOLOGY

## 2022-11-03 PROCEDURE — 80048 BASIC METABOLIC PNL TOTAL CA: CPT

## 2022-11-03 PROCEDURE — 160035 HCHG PACU - 1ST 60 MINS PHASE I: Performed by: INTERNAL MEDICINE

## 2022-11-03 PROCEDURE — 700105 HCHG RX REV CODE 258: Performed by: INTERNAL MEDICINE

## 2022-11-03 PROCEDURE — 99233 SBSQ HOSP IP/OBS HIGH 50: CPT | Performed by: INTERNAL MEDICINE

## 2022-11-03 PROCEDURE — 160002 HCHG RECOVERY MINUTES (STAT): Performed by: INTERNAL MEDICINE

## 2022-11-03 PROCEDURE — 160202 HCHG ENDO MINUTES - 1ST 30 MINS LEVEL 3: Performed by: INTERNAL MEDICINE

## 2022-11-03 PROCEDURE — 700102 HCHG RX REV CODE 250 W/ 637 OVERRIDE(OP): Performed by: HOSPITALIST

## 2022-11-03 PROCEDURE — A9270 NON-COVERED ITEM OR SERVICE: HCPCS | Performed by: INTERNAL MEDICINE

## 2022-11-03 PROCEDURE — 86850 RBC ANTIBODY SCREEN: CPT

## 2022-11-03 PROCEDURE — 700111 HCHG RX REV CODE 636 W/ 250 OVERRIDE (IP): Performed by: ANESTHESIOLOGY

## 2022-11-03 PROCEDURE — 700111 HCHG RX REV CODE 636 W/ 250 OVERRIDE (IP): Performed by: HOSPITALIST

## 2022-11-03 PROCEDURE — 86923 COMPATIBILITY TEST ELECTRIC: CPT

## 2022-11-03 PROCEDURE — 700102 HCHG RX REV CODE 250 W/ 637 OVERRIDE(OP): Performed by: INTERNAL MEDICINE

## 2022-11-03 PROCEDURE — 36415 COLL VENOUS BLD VENIPUNCTURE: CPT

## 2022-11-03 RX ORDER — SODIUM CHLORIDE, SODIUM LACTATE, POTASSIUM CHLORIDE, CALCIUM CHLORIDE 600; 310; 30; 20 MG/100ML; MG/100ML; MG/100ML; MG/100ML
INJECTION, SOLUTION INTRAVENOUS CONTINUOUS
Status: ACTIVE | OUTPATIENT
Start: 2022-11-03 | End: 2022-11-03

## 2022-11-03 RX ORDER — ONDANSETRON 2 MG/ML
4 INJECTION INTRAMUSCULAR; INTRAVENOUS
Status: DISCONTINUED | OUTPATIENT
Start: 2022-11-03 | End: 2022-11-03 | Stop reason: HOSPADM

## 2022-11-03 RX ORDER — OMEPRAZOLE 20 MG/1
20 CAPSULE, DELAYED RELEASE ORAL 2 TIMES DAILY
Status: DISCONTINUED | OUTPATIENT
Start: 2022-11-03 | End: 2022-11-04 | Stop reason: HOSPADM

## 2022-11-03 RX ORDER — HALOPERIDOL 5 MG/ML
1 INJECTION INTRAMUSCULAR
Status: DISCONTINUED | OUTPATIENT
Start: 2022-11-03 | End: 2022-11-03 | Stop reason: HOSPADM

## 2022-11-03 RX ORDER — MIDAZOLAM HYDROCHLORIDE 1 MG/ML
INJECTION INTRAMUSCULAR; INTRAVENOUS PRN
Status: DISCONTINUED | OUTPATIENT
Start: 2022-11-03 | End: 2022-11-03 | Stop reason: SURG

## 2022-11-03 RX ORDER — LIDOCAINE HYDROCHLORIDE 20 MG/ML
INJECTION, SOLUTION EPIDURAL; INFILTRATION; INTRACAUDAL; PERINEURAL PRN
Status: DISCONTINUED | OUTPATIENT
Start: 2022-11-03 | End: 2022-11-03 | Stop reason: SURG

## 2022-11-03 RX ORDER — DIPHENHYDRAMINE HYDROCHLORIDE 50 MG/ML
12.5 INJECTION INTRAMUSCULAR; INTRAVENOUS
Status: DISCONTINUED | OUTPATIENT
Start: 2022-11-03 | End: 2022-11-03 | Stop reason: HOSPADM

## 2022-11-03 RX ADMIN — TRAZODONE HYDROCHLORIDE 50 MG: 50 TABLET ORAL at 20:20

## 2022-11-03 RX ADMIN — PROPOFOL 50 MG: 10 INJECTION, EMULSION INTRAVENOUS at 11:14

## 2022-11-03 RX ADMIN — SODIUM CHLORIDE 250 MG: 9 INJECTION, SOLUTION INTRAVENOUS at 18:43

## 2022-11-03 RX ADMIN — ROSUVASTATIN 10 MG: 10 TABLET, FILM COATED ORAL at 20:21

## 2022-11-03 RX ADMIN — OMEPRAZOLE 20 MG: 20 CAPSULE, DELAYED RELEASE ORAL at 13:10

## 2022-11-03 RX ADMIN — LIDOCAINE HYDROCHLORIDE 50 MG: 20 INJECTION, SOLUTION EPIDURAL; INFILTRATION; INTRACAUDAL at 11:14

## 2022-11-03 RX ADMIN — ACETAMINOPHEN 650 MG: 325 TABLET, FILM COATED ORAL at 15:53

## 2022-11-03 RX ADMIN — MIDAZOLAM HYDROCHLORIDE 2 MG: 1 INJECTION, SOLUTION INTRAMUSCULAR; INTRAVENOUS at 11:08

## 2022-11-03 RX ADMIN — PANTOPRAZOLE SODIUM 40 MG: 40 INJECTION, POWDER, FOR SOLUTION INTRAVENOUS at 06:24

## 2022-11-03 RX ADMIN — SENNOSIDES AND DOCUSATE SODIUM 2 TABLET: 50; 8.6 TABLET ORAL at 06:24

## 2022-11-03 RX ADMIN — ACETAMINOPHEN 650 MG: 325 TABLET, FILM COATED ORAL at 07:16

## 2022-11-03 RX ADMIN — SODIUM CHLORIDE 250 MG: 9 INJECTION, SOLUTION INTRAVENOUS at 07:22

## 2022-11-03 RX ADMIN — FENOFIBRATE 134 MG: 134 CAPSULE ORAL at 20:21

## 2022-11-03 RX ADMIN — PROPOFOL 30 MG: 10 INJECTION, EMULSION INTRAVENOUS at 11:17

## 2022-11-03 RX ADMIN — CITALOPRAM HYDROBROMIDE 10 MG: 20 TABLET ORAL at 06:24

## 2022-11-03 ASSESSMENT — COGNITIVE AND FUNCTIONAL STATUS - GENERAL
SUGGESTED CMS G CODE MODIFIER DAILY ACTIVITY: CJ
DAILY ACTIVITIY SCORE: 20
WALKING IN HOSPITAL ROOM: A LITTLE
CLIMB 3 TO 5 STEPS WITH RAILING: A LITTLE
SUGGESTED CMS G CODE MODIFIER MOBILITY: CJ
MOBILITY SCORE: 22
PERSONAL GROOMING: A LITTLE
EATING MEALS: A LITTLE
HELP NEEDED FOR BATHING: A LITTLE
DRESSING REGULAR UPPER BODY CLOTHING: A LITTLE

## 2022-11-03 ASSESSMENT — ENCOUNTER SYMPTOMS
WEAKNESS: 0
FEVER: 0
SHORTNESS OF BREATH: 0
HEARTBURN: 0
PALPITATIONS: 0
FALLS: 1
MEMORY LOSS: 0
HEADACHES: 0
MYALGIAS: 0
BLURRED VISION: 0
POLYDIPSIA: 0
PHOTOPHOBIA: 0
CLAUDICATION: 0
HEMOPTYSIS: 0
BLOOD IN STOOL: 0
VOMITING: 0
DIZZINESS: 0
SENSORY CHANGE: 0
INSOMNIA: 0
WEAKNESS: 1
EYE DISCHARGE: 0
FLANK PAIN: 0
SINUS PAIN: 0
WHEEZING: 0
EYE PAIN: 0
ABDOMINAL PAIN: 0
SPEECH CHANGE: 0
CONSTIPATION: 0
NERVOUS/ANXIOUS: 0
SPUTUM PRODUCTION: 1
HEADACHES: 1
CHILLS: 0
ORTHOPNEA: 0
COUGH: 0
DEPRESSION: 0
DIARRHEA: 0
NAUSEA: 0

## 2022-11-03 ASSESSMENT — PAIN DESCRIPTION - PAIN TYPE
TYPE: ACUTE PAIN

## 2022-11-03 ASSESSMENT — PAIN SCALES - GENERAL: PAIN_LEVEL: 0

## 2022-11-03 ASSESSMENT — FIBROSIS 4 INDEX: FIB4 SCORE: 1.935821490812274622

## 2022-11-03 NOTE — PROCEDURES
DATE OF PROCEDURE:  11/03/2022     PROCEDURE:  EGD report.     ENDOSCOPIST: Vasile Negron MD     INDICATIONS:  Anemia and GI bleeding.     MEDICATIONS:  The patient received general anesthesia by Dr. Babin.    Please see anesthesia flow sheets for details.     DESCRIPTION OF PROCEDURE:  The patient was informed in detail of the   indications as well as the risks, benefits and alternatives of the procedure   and she indicates understanding of all this and agrees to proceed.  Consent is   signed and placed on the chart.  After the patient was deemed adequately   sedated, a standard Olympus flexible gastroscope was lubricated and gently   placed through a bite block over the top of her tongue down into her   esophagus.  From here, the scope was carefully maneuvered through the   esophagus into the stomach and any residual fluid was thoroughly suctioned.    There was no evidence of blood in the stomach.  The scope was then traversed   through the pylorus into the duodenal bulb and again noted to have a large   distal duodenal bulb ulcer, however, appears to be in stages of healing with   no active bleeding or oozing and previously placed endoclip remains in place.    We did not traverse past this area given concerns of potentially dislodging   the endoclip.  Scope was then withdrawn looking mucosa in a circumferential   methodic manner.  Throughout, no additional findings in the duodenum were   appreciated.  The scope was withdrawn back into the stomach, whereby careful   inspection including in retroflexion position revealed evidence of previously   noted prepyloric gastric ulcer, again with no high risk stigmata as well as   there was evidence of gastritis in previous biopsy sites.  Air was suctioned   out of stomach, scope withdrawn back into the esophagus, whereby a careful   inspection did reveal a somewhat nonobstructive distal esophageal   stricture/Schatzki's ring. Scope was then withdrawn and procedure  terminated.     FINDINGS:  1.  Duodenal bulb ulcer as previously noted, now with no evidence of active   bleeding or any evidence of recent bleeding with endoclip remaining in place.  2.  Previously noted prepyloric gastric ulcer -- low risk.  3.  Distal esophageal stricture.     COMPLICATIONS:  None.     TISSUE/BIOPSIES:  None.     THERAPY:  None.     IMPRESSION/PLAN/MEDICAL DECISION MAKIN.  Gastrointestinal hemorrhage.  At this point, I see no evidence of active   bleeding.  Cannot explain what appears to be potential decrease in her   hemoglobin in spite of transfusions.  I would advocate for restarting p.o.   intake, which I have ordered already.  I will switch her to oral omeprazole   and stop her IV pantoprazole and can reassess her hemoglobin in the morning.    Would consider additional studies if hemoglobin continues to drop, although   again we are seeing no overt hematemesis, hematochezia or melena at this time   to suggest ongoing bleeding.  Consider hemolysis?  2.  Posthemorrhagic anemia.  See discussion above.  3.  Duodenal ulcer.        ______________________________  MD JOSH MEDRANO/MU/TRISTAN    DD:  2022 11:37  DT:  2022 12:00    Job#:  [id number]

## 2022-11-03 NOTE — PROGRESS NOTES
Gastroenterology Progress Note               Author:  JR Orellana Date & Time Created: 11/3/2022 9:19 AM       Patient ID:  Name:             Cindy Cruz  YOB: 1965  Age:                 57 y.o.  female  MRN:               [id number]      Referring Provider:  Shawanda Bess MD      Presenting Chief Complaint:  GI Bleed, Syncope      History of Present Illness:    This is a very pleasant 57 y.o. female seen in consultation for GI bleed and syncope.  The patient presented to the [facility] emergency room on 10/29/2022 with complaints of syncopal episode x2.  The patient states that over the last week or so she has been feeling more lethargic, tired, and nauseous.  However, last night she began having loose stools initially that appeared dark brown or black then transition to bright red over the last 2 days.  She got up to go to the bathroom last night to vomit, and during this became lightheaded and passed out for a few moments.  She took her blood pressure following this episode and this was low at 70/35 with a second syncopal episode and then she regained consciousness and blood pressure improved.  She denies abdominal pain, dysphagia, odynophagia, unintentional weight loss.  She states that she does not take NSAIDs regularly, but takes the occasional Excedrin for headaches.  Last dose of this was 1.5 weeks ago.  She had a hip replacement in May of this year and underwent cardiac evaluation with normal stress test and echocardiogram with clearance for the procedure at that time by cardiology.    Upon initial evaluation noted to have hemoglobin 8.4 which trended down to 7.2.  Her normal hemoglobin ranges around 12.  She does have mild WBC elevation.  She has never had a colonoscopy or endoscopy.    INTERVAL HISTORY:    11/1/22: No acute overnight events. States she is feeling better. Had a bowel movement, non bloody. Hgb: 7-->6.9 despite 1 unit of PRBC.     EGD  10/31/22: FINDINGS:    1.  Gastric prepyloric ulcer without stigmata.  2.  Duodenal ulcer with high risk stigmata in the form of the adherent clot   treated with epinephrine and clipping.  3.  Sigmoid colon polyp.    11/2/22: Stable. No acute overnight events. Hgb trended down from 7.1-->6.4.  % sat: 12. Received 1 unit of PRBC and also getting IV iron. Bowel movements are brown. Denies overt GI bleed. No abdominal pain. CTA pending.     11/3/22: Hgb 6.9 despite unit of PRBC and IV iron yesterday. CTA: no acute GI hemorrhage. Denies abdominal pain, melena, hematochezia. Plan to repeat EGD with possible push enteroscopy.    Review of Systems:  Review of Systems   Constitutional:  Positive for malaise/fatigue. Negative for chills and fever.   HENT:  Positive for congestion. Negative for sinus pain.    Eyes:  Negative for pain and discharge.   Respiratory:  Positive for sputum production. Negative for cough, hemoptysis and wheezing.    Cardiovascular:  Negative for chest pain, palpitations and orthopnea.   Gastrointestinal:  Negative for abdominal pain, blood in stool, melena, nausea and vomiting.   Genitourinary:  Negative for flank pain and hematuria.   Musculoskeletal:  Positive for falls and joint pain.   Skin:  Negative for itching and rash.   Neurological:  Positive for weakness and headaches. Negative for dizziness.   Endo/Heme/Allergies:  Negative for environmental allergies and polydipsia.   Psychiatric/Behavioral:  Negative for memory loss. The patient is not nervous/anxious.            Past Medical History:  Past Medical History:   Diagnosis Date    Arthritis     osteo LT hip    Asthma     Hypertension     Pain 05/17/2022    LT hip    PKU (phenylketonuria) ([facility])      Active Hospital Problems    Diagnosis     Duodenal ulcer with hemorrhage [K26.4]     Anemia due to acute blood loss [D62]     Elevated glucose [R73.09]     Leukocytosis [D72.829]     HTN (hypertension) [I10]     HLD (hyperlipidemia) [E78.5]      SIRS (systemic inflammatory response syndrome) (HCC) [R65.10]     Syncope [R55]     Hematochezia [K92.1]          Past Surgical History:  Past Surgical History:   Procedure Laterality Date    PANENDOSCOPY N/A 10/31/2022    Procedure: EGD, WITH COLONOSCOPY;  Surgeon: Vasile Negron M.D.;  Location: [facility];  Service: Gastroenterology    [place] TOTAL HIP ARTHROPLASTY Left 5/19/2022    Procedure: ARTHROPLASTY, HIP, TOTAL - ANY OTHER INDICATED PROCEDURES;  Surgeon: Arden Hamlin M.D.;  Location: [facility];  Service: Orthopedics           [facility] Medications:  Current Facility-Administered Medications   Medication Dose Frequency Provider Last Rate Last Admin    ferric gluconate complex (Ferrlecit) 250 mg in  mL IVPB  250 mg BID Andriy Baker M.D.   Stopped at 11/03/22 0822    citalopram (CeleXA) tablet 10 mg  10 mg DAILY Elizabeth Walls M.D.   10 mg at 11/03/22 0624    rosuvastatin (CRESTOR) tablet 10 mg  10 mg QHS Elizabeth Walls M.D.   10 mg at 11/02/22 2043    traZODone (DESYREL) tablet 50 mg  50 mg Nightly Elizabeth Walls M.D.   50 mg at 11/02/22 2043    acetaminophen (Tylenol) tablet 650 mg  650 mg Q6HRS PRN Elizabeth Walls M.D.   650 mg at 11/03/22 0716    ondansetron (ZOFRAN) syringe/vial injection 4 mg  4 mg Q4HRS PRN Elizabeth Walls M.D.        ondansetron (ZOFRAN ODT) dispertab 4 mg  4 mg Q4HRS PRN Elizabeth Walls M.D.        promethazine (PHENERGAN) tablet 12.5-25 mg  12.5-25 mg Q4HRS PRN Elizabeth Walls M.D.        promethazine (PHENERGAN) suppository 12.5-25 mg  12.5-25 mg Q4HRS PRN Elizabeth Walls M.D.        prochlorperazine (COMPAZINE) injection 5-10 mg  5-10 mg Q4HRS PRN Elizabeth Walls M.D.        senna-docusate (PERICOLACE or SENOKOT S) 8.6-50 MG per tablet 2 Tablet  2 Tablet BID Elizabeth Walls M.D.   2 Tablet at 11/03/22 0624    And    polyethylene glycol/lytes (MIRALAX) PACKET 1 Packet  1  Packet QDAY PRN Elizabeth Walls M.D.        And    magnesium hydroxide (MILK OF MAGNESIA) suspension 30 mL  30 mL QDAY PRN Elizabeth Walls M.D.        And    bisacodyl (DULCOLAX) suppository 10 mg  10 mg QDAY PRN Elizabeth Walls M.D.        [Held by provider] lisinopril (PRINIVIL) tablet 20 mg  20 mg Q DAY Elizabeth Walls M.D.   20 mg at 10/30/22 0609    [Held by provider] hydroCHLOROthiazide (HYDRODIURIL) tablet 12.5 mg  12.5 mg Q DAY Elizabeth Walls M.D.   12.5 mg at 10/30/22 0609    fenofibrate micronized (LOFIBRA) capsule 134 mg  134 mg QHS Elizabeth Walls M.D.   134 mg at 11/02/22 2043    pantoprazole (Protonix) injection 40 mg  40 mg BID Elizabeth Walls M.D.   40 mg at 11/03/22 0624    lactated ringers infusion   Continuous Shawanda Bess M.D. 65 mL/hr at 11/02/22 0047 New Bag at 11/02/22 0047   Last reviewed on 10/31/2022  7:41 AM by Ileana Jaffe R.N.       Current Outpatient Medications:  Medications Prior to Admission   Medication Sig Dispense Refill Last Dose    beclomethasone HFA (QVAR REDIHALER) 40 MCG/ACT inhaler Inhale 1 Puff 2 times a day.   10/29/2022 at 0700    Cyanocobalamin ([employer] B12 GUMMIES PO) Take 2 Tablets by mouth every day.   10/27/2022 at AM    doxycycline (VIBRAMYCIN) 100 MG Tab Take 100 mg by mouth 2 times a day. Pt started on 10/25/2022 for 10 day course   10/29/2022 at 1900    albuterol 108 (90 Base) MCG/ACT Aero Soln inhalation aerosol Inhale 2 Puffs every 6 hours as needed for Shortness of Breath. 8.5 g 1 10/29/2022 at 1300    citalopram (CELEXA) 10 MG tablet Take 10 mg by mouth every day.   10/29/2022 at 0700    fenofibrate (TRIGLIDE) 160 MG tablet Take 160 mg by mouth at bedtime.   10/29/2022 at 2000    fluticasone (FLONASE) 50 MCG/ACT nasal spray Administer 1 Spray into affected nostril(S) as needed. Indications: Stuffy Nose   10/28/2022 at Unknown    lisinopril-hydrochlorothiazide (PRINZIDE) 20-12.5 MG per tablet Take 1  Tablet by mouth every day.   10/29/2022 at 0700    meloxicam (MOBIC) 7.5 MG Tab Take 7.5 mg by mouth as needed for Severe Pain.   > 3 weeks at Unknown    rosuvastatin (CRESTOR) 10 MG Tab Take 10 mg by mouth at bedtime.   10/29/2022 at 2000    traZODone (DESYREL) 50 MG Tab Take 50 mg by mouth every evening.   10/28/2022 at PM    docusate sodium (COLACE) 100 MG Cap Take 100 mg by mouth 2 times a day as needed for Constipation.   > 2.5 weeks at Unknown    ASPIRIN-ACETAMINOPHEN-CAFFEINE PO Take 2 Tablets by mouth every 6 hours as needed for Headache.   10/29/2022 at 1547    VITAMIN D PO Take 1 Tablet by mouth every day.   10/27/2022 at AM    Ascorbic Acid (VITAMIN C PO) Take 1 Tablet by mouth every day.   10/29/2022 at 0700    Multiple Vitamins-Minerals (ZINC PO) Take 1 Tablet by mouth every day.   10/27/2022 at AM    BIOTIN PO Take 1 Tablet by mouth every day.   10/27/2022 at AM         Medication Allergies:  Allergies   Allergen Reactions    Food Swelling     Pumpkin seeds, tongue swelling     Keflex Palpitations     Pt reports that she also gets a headache     Penicillins      Burning sensation    Dairy Food Allergy      Patient states Metabolic Syndrome         Family Medical History:  History reviewed. No pertinent family history.      Social History:  Social History     Socioeconomic History    Marital status:      Spouse name: Not on file    Number of children: Not on file    Years of education: Not on file    Highest education level: Not on file   Occupational History    Not on file   Tobacco Use    Smoking status: Never    Smokeless tobacco: Never   Vaping Use    Vaping Use: Never used   Substance and Sexual Activity    Alcohol use: Not Currently    Drug use: Not Currently    Sexual activity: Not on file   Other Topics Concern    Not on file   Social History Narrative    Not on file     Social Determinants of Health     Financial Resource Strain: Not on file   Food Insecurity: Not on file  "  Transportation Needs: Not on file   Physical Activity: Not on file   Stress: Not on file   Social Connections: Not on file   Intimate Partner Violence: Not on file   Housing Stability: Not on file         Vital signs:  Weight/BMI: Body mass index is 34.15 kg/m².  /71   Pulse 79   Temp 37.1 °C (98.7 °F) (Oral)   Resp 16   Ht 1.575 m (5' 2\")   Wt 84.7 kg (186 lb 11.7 oz)   SpO2 96%   Vitals:    11/02/22 1357 11/02/22 1934 11/03/22 0301 11/03/22 0804   BP: 113/56 119/57 122/65 124/71   Pulse: 84 82 83 79   Resp: 18 16 16 16   Temp: 36.7 °C (98.1 °F) 36.9 °C (98.5 °F) 36.8 °C (98.3 °F) 37.1 °C (98.7 °F)   TempSrc: Oral Oral Oral Oral   SpO2: 94% 93% 94% 96%   Weight:   84.7 kg (186 lb 11.7 oz)    Height:         Oxygen Therapy:  Pulse Oximetry: 96 %, O2 (LPM): 0, O2 Delivery Device: None - Room Air    Intake/Output Summary (Last 24 hours) at 11/3/2022 0919  Last data filed at 11/2/2022 1700  Gross per 24 hour   Intake 468.75 ml   Output --   Net 468.75 ml           Physical Exam:  Physical Exam  Vitals and nursing note reviewed.   Constitutional:       Appearance: She is obese. She is not ill-appearing.   HENT:      Right Ear: External ear normal.      Left Ear: External ear normal.      Nose: Nose normal. No congestion.      Mouth/Throat:      Pharynx: Oropharynx is clear. No oropharyngeal exudate.   Eyes:      General: No scleral icterus.     Extraocular Movements: Extraocular movements intact.      Pupils: Pupils are equal, round, and reactive to light.   Cardiovascular:      Rate and Rhythm: Normal rate and regular rhythm.      Pulses: Normal pulses.      Heart sounds: Normal heart sounds. No murmur heard.  Pulmonary:      Effort: Pulmonary effort is normal.      Breath sounds: Normal breath sounds. No wheezing or rales.   Abdominal:      General: Abdomen is flat. Bowel sounds are normal. There is no distension.      Palpations: Abdomen is soft.      Tenderness: There is no abdominal tenderness. "   Musculoskeletal:      Cervical back: Neck supple.      Right lower leg: No edema.      Left lower leg: No edema.   Lymphadenopathy:      Cervical: No cervical adenopathy.   Skin:     General: Skin is warm and dry.      Coloration: Skin is pale.      Findings: No lesion (two areas of what appear to be possible spider angioma to upper chest).   Neurological:      General: No focal deficit present.      Mental Status: She is alert and oriented to person, place, and time.   Psychiatric:         Thought Content: Thought content normal.         Judgment: Judgment normal.         Labs:  Recent Labs     11/01/22 0145 11/02/22 0437 11/03/22 0418   SODIUM 140 141 142   POTASSIUM 3.5* 3.5* 3.5*   CHLORIDE 108 109 109   CO2 22 21 23   BUN 11 11 14   CREATININE 0.51 0.42* 0.43*   MAGNESIUM 1.8  --   --    CALCIUM 8.5 8.4 8.4       Recent Labs     11/01/22  0145 11/02/22 0437 11/03/22 0418   GLUCOSE 93 90 95       Recent Labs     11/01/22 0145 11/02/22 0437 11/03/22 0418   WBC 5.1 4.1* 5.2       Recent Labs     11/01/22  0145 11/01/22  0907 11/01/22  1509 11/02/22 0437 11/03/22 0418   RBC 2.51*  --   --  2.26* 2.43*   HEMOGLOBIN 7.0*   < > 7.1* 6.4* 6.9*   HEMATOCRIT 22.3*   < > 22.0* 20.3* 21.8*   PLATELETCT 164  --   --  153* 187    < > = values in this interval not displayed.       Recent Results (from the past 24 hour(s))   CBC WITHOUT DIFFERENTIAL    Collection Time: 11/03/22  4:18 AM   Result Value Ref Range    WBC 5.2 4.8 - 10.8 K/uL    RBC 2.43 (L) 4.20 - 5.40 M/uL    Hemoglobin 6.9 (L) 12.0 - 16.0 g/dL    Hematocrit 21.8 (L) 37.0 - 47.0 %    MCV 89.7 81.4 - 97.8 fL    MCH 28.4 27.0 - 33.0 pg    MCHC 31.7 (L) 33.6 - 35.0 g/dL    RDW 50.8 (H) 35.9 - 50.0 fL    Platelet Count 187 164 - 446 K/uL    MPV 12.0 9.0 - 12.9 fL   Basic Metabolic Panel    Collection Time: 11/03/22  4:18 AM   Result Value Ref Range    Sodium 142 135 - 145 mmol/L    Potassium 3.5 (L) 3.6 - 5.5 mmol/L    Chloride 109 96 - 112 mmol/L    Co2  23 20 - 33 mmol/L    Glucose 95 65 - 99 mg/dL    Bun 14 8 - 22 mg/dL    Creatinine 0.43 (L) 0.50 - 1.40 mg/dL    Calcium 8.4 8.4 - 10.2 mg/dL    Anion Gap 10.0 7.0 - 16.0   ESTIMATED GFR    Collection Time: 11/03/22  4:18 AM   Result Value Ref Range    GFR (CKD-EPI) 113 >60 mL/min/1.73 m 2         Radiology Review:  CTA ABDOMEN PELVIS W & W/O POST PROCESS   Final Result         1. No evidence of active contrast extravasation/active bleeding.      2. 17 mm left lower pole renal calculus with mild left-sided hydronephrosis.      EC-ECHOCARDIOGRAM COMPLETE W/O CONT   Final Result      DX-CHEST-PORTABLE (1 VIEW)   Final Result      1.  No acute cardiac or pulmonary abnormalities are identified.            MDM (Data Review):   -Records reviewed and summarized in current documentation  -I personally reviewed and interpreted the laboratory results  -I personally reviewed the radiology images        Medical Decision Making, by Problem:  Active Hospital Problems    Diagnosis     Duodenal ulcer with hemorrhage [K26.4]     Anemia due to acute blood loss [D62]     Elevated glucose [R73.09]     Leukocytosis [D72.829]     HTN (hypertension) [I10]     HLD (hyperlipidemia) [E78.5]     SIRS (systemic inflammatory response syndrome) (HCC) [R65.10]     Syncope [R55]     Hematochezia [K92.1]            Assessment/Recommendations:  57-year-old female with recent worsening lethargy after sinus infection and treatment with doxycycline who developed what sounds to be dark stools then red stools and syncope over the last day.  Syncopal episodes seem to be related to vasovagal response and have resolved.  She had a cardiac evaluation prior to hip surgery earlier this year which was normal.  Differential diagnosis for GI bleeding does include peptic ulcer disease, malignancy, diverticular bleed, or other bleeding lesion.    Assessment:  -Melena and hematochezia-suggestive of upper or lower GI bleed  -Vasovagal syncope  -Acute posthemorrhagic  anemia  -Leukocytosis-likely reactive  -Obesity  -Hyperlipidemia  -Hypertension    Plan:  - trend Hgb and transfuse >7  -Continue PPI IV BID  -NPO  -Risks, benefits, and alternatives of EGD with possible push enteroscopy were discussed with patient and/or family member(s).  Consenting person(s) were given opportunities to ask questions and discuss other options.  Risks including but not limited to perforation, infection, bleeding, missed lesion(s), possible need for surgery(ies) and/or interventional radiology, possible need for repeat procedure(s) and/or additional testing, hospitalization possibly prolonged, cardiac and/or pulmonary event, aspiration, hypoxia, stroke, medication and/or anesthesia reaction, indefinite diagnosis, discomfort, unsuccessful and/or incomplete procedure, ineffective therapy and/or persistent symptoms, damage to adjacent organs and/or vascular structures, and other adverse events possibly life-threatening.  Interactive discussion was undertaken with Layman's terms. I answered questions in full and to satisfaction.  Consenting person(s) stated understanding and acceptance of these risks, and wished to proceed.  Informed consent was given in clear state of mind.          Bryanna Barksdale, A.P.R.N.      Thank you for inviting me to participate in the care of this patient. Please do not hesitate to call GI consultants with additional questions/concerns or changes in the patient's clinical status at [phone].      Core Quality Measures   Reviewed items:  Labs, Medications and Radiology reports reviewed

## 2022-11-03 NOTE — PROGRESS NOTES
Called down to lab and spoke to Nallely regarding status of released PRBCs. Per Nallely, will call unit when PRBCs is ready for .

## 2022-11-03 NOTE — ANESTHESIA POSTPROCEDURE EVALUATION
Patient: Cindy Cruz    Procedure Summary     Date: 11/03/22 Room / Location:  ENDOSCOPIC ULTRASOUND ROOM / SURGERY [facility]    Anesthesia Start: 1108 Anesthesia Stop: 1132    Procedure: ESOPHAGOGASTRODUOENDOSCOPY (Abdomen) Diagnosis: (Melena and Hematochezia, acute posthemorrhagic anemia)    Surgeons: Vasile Negron M.D. Responsible Provider: Felicity Babin M.D.    Anesthesia Type: MAC ASA Status: 2          Final Anesthesia Type: MAC  Last vitals  BP   Blood Pressure: 116/66    Temp   36.6 °C (97.9 °F)    Pulse   73   Resp   16    SpO2   98 %      Anesthesia Post Evaluation    Patient location during evaluation: PACU  Patient participation: complete - patient participated  Level of consciousness: awake and alert  Pain score: 0    Airway patency: patent  Anesthetic complications: no  Cardiovascular status: hemodynamically stable  Respiratory status: acceptable  Hydration status: euvolemic    PONV: none          No notable events documented.     Nurse Pain Score: 0 (NPRS)

## 2022-11-03 NOTE — PROGRESS NOTES
Blood transfusion started, 2 RN check completed. Patient consent signed and placed in chart. Patient has had previous blood transfusions and is aware of the side effects and know when to alert nursing staff is any side effects occur. Patient resting in bed at this time. No complaints at this time. Initial VS stable.

## 2022-11-03 NOTE — ANESTHESIA PREPROCEDURE EVALUATION
Case: [id number] Date/Time: 11/03/22 1047    Procedure: ESOPHAGOGASTRODUOENDOSCOPY    Location:  ENDOSCOPIC ULTRASOUND ROOM / SURGERY [facility]    Surgeons: Vasile Negron M.D.          Relevant Problems   PULMONARY   (positive) Asthma      CARDIAC   (positive) HTN (hypertension)      GI   (positive) Duodenal ulcer with hemorrhage      Other   (positive) Anemia due to acute blood loss   (positive) Elevated glucose   (positive) HLD (hyperlipidemia)   (positive) Hematochezia   (positive) Syncope       Physical Exam    Airway   Mallampati: II  TM distance: >3 FB  Neck ROM: full       Cardiovascular - normal exam  Rhythm: regular  Rate: normal  (-) murmur     Dental - normal exam           Pulmonary - normal exam  Breath sounds clear to auscultation     Abdominal    Neurological - normal exam                 Anesthesia Plan    ASA 2       Plan - MAC             Plan Factors:   Patient was not previously instructed to abstain from smoking on day of procedure.  Patient did not smoke on day of procedure.      Induction: intravenous    Postoperative Plan: Postoperative administration of opioids is intended.    Pertinent diagnostic labs and testing reviewed    Informed Consent:    Anesthetic plan and risks discussed with patient.    Use of blood products discussed with: patient whom consented to blood products.

## 2022-11-03 NOTE — ANESTHESIA TIME REPORT
Anesthesia Start and Stop Event Times     Date Time Event    11/3/2022 10:42 AM Ready for Procedure    11/3/2022 11:08 AM Anesthesia Start    11/3/2022 11:32 AM Anesthesia Stop        Responsible Staff  11/03/22    Name Role Begin End    Felicity Babin M.D. Anesthesiologist 11/03/22 11:08 AM 11/03/22 11:32 AM        Overtime Reason:  no overtime (within assigned shift)    Comments:

## 2022-11-03 NOTE — PROGRESS NOTES
[facility] Medicine Daily Progress Note    Date of Service  11/3/2022    Chief Complaint  Cindy Cruz is a 57 y.o. female admitted 10/29/2022 with hematochezia and syncope.    Hospital Course  This is a 58 yo female with a history of HTN, HLD, asthma, hip arthritis s/p QUINTIN earlier this year, phenylketonuria and obesity. She presented to hospital with syncope after having vomiting and diarrhea after initiation of doxycycline for a sinus infection. She does report having hematochezia, and was heme positive in the ER.  GI consulted and she had EGD which showed gastric and duodenal ulcers.  She received epinephrine injections and endoclip.  She has received transfusion on blood on 10/30 and 10/31.    Interval Problem Update  11/1 Patient states she is overall feeling better, no episodes of hypotension documented.  She is not dizzy.  HGB is 7.0 this am and that is after receiving blood overnight for HGB of 6.9.  She is currently tolerating a full liquid diet.  I'm concerned that with the continued low HGB she may still be bleeding.  Will discuss further with GI.  11/2 Patient continues to feel well despite her dropping HGB which is down to 6.4 this am.  I've ordered 1 unit of blood to be transfused along with IV iron replacement.  CTA Abdomen/pelvis pending to see if any other source of bleeding identified.  Patient had a normal brown bowel movement yesterday without any signs of bleeding.  11/3  Patient again continues to feel well.  She received 1 unit yesterday and hgb has only improved to 6.9.  She will receive another unit following EGD today.  CTA abdomen/pelvis unrevealing of a source of bleeding.  She will complete IV iron this afternoon - she has tolerated 3 doses will without issue.     I have discussed this patient's plan of care and discharge plan at IDT rounds today with Case Management, Nursing, Nursing leadership, and other members of the IDT team.    Consultants/Specialty  GI    Code Status  Full  Code    Disposition  Patient is not medically cleared for discharge.   Anticipate discharge to to home with close outpatient follow-up.  I have placed the appropriate orders for post-discharge needs.    Review of Systems  Review of Systems   Constitutional:  Negative for chills and fever.   HENT:  Negative for congestion.    Eyes:  Negative for blurred vision and photophobia.   Respiratory:  Negative for cough and shortness of breath.    Cardiovascular:  Negative for chest pain, claudication and leg swelling.   Gastrointestinal:  Negative for abdominal pain, constipation, diarrhea, heartburn and vomiting.   Genitourinary:  Negative for dysuria and hematuria.   Musculoskeletal:  Negative for joint pain and myalgias.   Skin:  Negative for itching and rash.   Neurological:  Negative for dizziness, sensory change, speech change, weakness and headaches.   Psychiatric/Behavioral:  Negative for depression. The patient is not nervous/anxious and does not have insomnia.       Physical Exam  Temp:  [36.6 °C (97.8 °F)-37.1 °C (98.7 °F)] 36.6 °C (97.8 °F)  Pulse:  [79-84] 84  Resp:  [16-18] 16  BP: (113-141)/(56-75) 141/75  SpO2:  [93 %-97 %] 97 %    Physical Exam  Vitals and nursing note reviewed.   Constitutional:       General: She is not in acute distress.     Appearance: Normal appearance. She is not ill-appearing.   HENT:      Head: Normocephalic and atraumatic.      Nose: Nose normal.   Cardiovascular:      Rate and Rhythm: Normal rate and regular rhythm.      Heart sounds: Normal heart sounds. No murmur heard.  Pulmonary:      Effort: Pulmonary effort is normal.      Breath sounds: Normal breath sounds.   Abdominal:      General: Bowel sounds are normal. There is no distension.      Palpations: Abdomen is soft.   Musculoskeletal:         General: No swelling or tenderness.      Cervical back: Neck supple.   Skin:     General: Skin is warm and dry.   Neurological:      General: No focal deficit present.      Mental  Status: She is alert and oriented to person, place, and time.   Psychiatric:         Mood and Affect: Mood normal.       Fluids    Intake/Output Summary (Last 24 hours) at 11/3/2022 1108  Last data filed at 11/2/2022 1700  Gross per 24 hour   Intake 240 ml   Output --   Net 240 ml         Laboratory  Recent Labs     11/01/22  0145 11/01/22  0907 11/01/22  1509 11/02/22  0437 11/03/22  0418   WBC 5.1  --   --  4.1* 5.2   RBC 2.51*  --   --  2.26* 2.43*   HEMOGLOBIN 7.0*   < > 7.1* 6.4* 6.9*   HEMATOCRIT 22.3*   < > 22.0* 20.3* 21.8*   MCV 88.8  --   --  89.8 89.7   MCH 27.9  --   --  28.3 28.4   MCHC 31.4*  --   --  31.5* 31.7*   RDW 51.7*  --   --  51.3* 50.8*   PLATELETCT 164  --   --  153* 187   MPV 12.0  --   --  11.4 12.0    < > = values in this interval not displayed.       Recent Labs     11/01/22 0145 11/02/22 0437 11/03/22 0418   SODIUM 140 141 142   POTASSIUM 3.5* 3.5* 3.5*   CHLORIDE 108 109 109   CO2 22 21 23   GLUCOSE 93 90 95   BUN 11 11 14   CREATININE 0.51 0.42* 0.43*   CALCIUM 8.5 8.4 8.4                     Imaging  CTA ABDOMEN PELVIS W & W/O POST PROCESS   Final Result         1. No evidence of active contrast extravasation/active bleeding.      2. 17 mm left lower pole renal calculus with mild left-sided hydronephrosis.      EC-ECHOCARDIOGRAM COMPLETE W/O CONT   Final Result      DX-CHEST-PORTABLE (1 VIEW)   Final Result      1.  No acute cardiac or pulmonary abnormalities are identified.             Assessment/Plan  * Anemia due to acute blood loss  Assessment & Plan  -Under the context of hematochezia and melena  - s/p 3u PRBC since admission, H/H is not increasing  - HGB 6.9 this am  - transfuse another unit  - CTA abdomen/pelvis unremarkable for source of bleeding  - Patient to return to EGD today  - IV iron replacement given significant iron deficiency      Syncope  Assessment & Plan  -Syncope under the context of rectal bleeding - likely vasovagal due to n/v/d/GIB. Echo normal.  - BP  70/35 at home    SIRS (systemic inflammatory response syndrome) (HCC)  Assessment & Plan  Mildly dirty UA, but pt without urinary symptoms - stopped Rocephin, white count likely secondary to GIB and tachycardia from anemia   Procal negative      HLD (hyperlipidemia)  Assessment & Plan  -Continue rosuvastatin.    HTN (hypertension)  Assessment & Plan  -She takes lisinopril and hydrochlorothiazide.   - Hold home antihypertensives for now given her hypotension PTA and normotension here     Leukocytosis  Assessment & Plan  -WBC is 13.0 on admission.  - Resolved - stress reaction    Elevated glucose  Assessment & Plan  -- A1c 5.2, likely stress reaction    Duodenal ulcer with hemorrhage- (present on admission)  Assessment & Plan  - Both BRB and melena at home PTA, has resolved  - First EGD with evidence of bleeding from a duodenal ulcer - epi and clipped, continue on PPI, Repeat EGD today.  - Oral iron  - s/p 3u PRBC   - Pt takes excedrin at home, advised her to take only Tylenol as needed          VTE prophylaxis: SCDs/TEDs and pharmacologic prophylaxis contraindicated due to active GI bleeding    I have performed a physical exam and reviewed and updated ROS and Plan today (11/3/2022). In review of yesterday's note (11/2/2022), there are no changes except as documented above.

## 2022-11-03 NOTE — PROGRESS NOTES
Received bedside report from MILKA Gauthier, pt care assumed. VSS, pt assessment complete. Pt A&Ox4, POC discussed with pt and verbalizes no questions. Pt denies any additional needs at this time. Bed locked and in lowest position, bed alarm not activated. Pt educated on fall risk and verbalized understanding, call light within reach, hourly rounding initiated.

## 2022-11-03 NOTE — PROGRESS NOTES
Discussed unremarkable CTA abdomen/pelvis with GI.  Possible repeat endoscopy tomorrow if H/H drops again.  NPO after midnight and repeat H/H in the am.

## 2022-11-03 NOTE — OR NURSING
1128: To PACU post EGD. OPA dc'd on arrival. Breathing is spontaneous and unlabored.    1137: Pt more awake. No pain or nausea.    1204: No change. Meets criteria for room.

## 2022-11-03 NOTE — CARE PLAN
The patient is Stable - Low risk of patient condition declining or worsening    Shift Goals  Clinical Goals: Monitor H&H  Patient Goals: Rest and home    Progress made toward(s) clinical / shift goals:    Problem: Knowledge Deficit - Standard  Goal: Patient and family/care givers will demonstrate understanding of plan of care, disease process/condition, diagnostic tests and medications  Outcome: Progressing     Problem: Psychosocial  Goal: Patient's level of anxiety will decrease  Outcome: Progressing  Goal: Patient's ability to verbalize feelings about condition will improve  Outcome: Progressing  Goal: Patient's ability to re-evaluate and adapt role responsibilities will improve  Outcome: Progressing     Problem: Mobility  Goal: Patient's capacity to carry out activities will improve  Outcome: Progressing     Problem: Self Care  Goal: Patient will have the ability to perform ADLs independently or with assistance (bathe, groom, dress, toilet and feed)  Outcome: Progressing

## 2022-11-04 VITALS
DIASTOLIC BLOOD PRESSURE: 67 MMHG | HEART RATE: 70 BPM | WEIGHT: 186.73 LBS | TEMPERATURE: 98.2 F | BODY MASS INDEX: 34.36 KG/M2 | HEIGHT: 62 IN | RESPIRATION RATE: 18 BRPM | SYSTOLIC BLOOD PRESSURE: 121 MMHG | OXYGEN SATURATION: 94 %

## 2022-11-04 LAB
ANION GAP SERPL CALC-SCNC: 8 MMOL/L (ref 7–16)
BILIRUB CONJ SERPL-MCNC: <0.2 MG/DL (ref 0.1–0.5)
BILIRUB INDIRECT SERPL-MCNC: NORMAL MG/DL (ref 0–1)
BILIRUB SERPL-MCNC: 0.4 MG/DL (ref 0.1–1.5)
BUN SERPL-MCNC: 12 MG/DL (ref 8–22)
CALCIUM SERPL-MCNC: 8.6 MG/DL (ref 8.4–10.2)
CHLORIDE SERPL-SCNC: 108 MMOL/L (ref 96–112)
CO2 SERPL-SCNC: 23 MMOL/L (ref 20–33)
CREAT SERPL-MCNC: 0.37 MG/DL (ref 0.5–1.4)
ERYTHROCYTE [DISTWIDTH] IN BLOOD BY AUTOMATED COUNT: 50.1 FL (ref 35.9–50)
GFR SERPLBLD CREATININE-BSD FMLA CKD-EPI: 117 ML/MIN/1.73 M 2
GLUCOSE SERPL-MCNC: 86 MG/DL (ref 65–99)
HCT VFR BLD AUTO: 25 % (ref 37–47)
HGB BLD-MCNC: 8.1 G/DL (ref 12–16)
LDH SERPL L TO P-CCNC: 167 U/L (ref 107–266)
MCH RBC QN AUTO: 29.1 PG (ref 27–33)
MCHC RBC AUTO-ENTMCNC: 32.4 G/DL (ref 33.6–35)
MCV RBC AUTO: 89.9 FL (ref 81.4–97.8)
PLATELET # BLD AUTO: 189 K/UL (ref 164–446)
PMV BLD AUTO: 11.6 FL (ref 9–12.9)
POTASSIUM SERPL-SCNC: 3.5 MMOL/L (ref 3.6–5.5)
RBC # BLD AUTO: 2.78 M/UL (ref 4.2–5.4)
SODIUM SERPL-SCNC: 139 MMOL/L (ref 135–145)
WBC # BLD AUTO: 6.2 K/UL (ref 4.8–10.8)

## 2022-11-04 PROCEDURE — A9270 NON-COVERED ITEM OR SERVICE: HCPCS | Performed by: INTERNAL MEDICINE

## 2022-11-04 PROCEDURE — A9270 NON-COVERED ITEM OR SERVICE: HCPCS | Performed by: HOSPITALIST

## 2022-11-04 PROCEDURE — 85027 COMPLETE CBC AUTOMATED: CPT

## 2022-11-04 PROCEDURE — 83615 LACTATE (LD) (LDH) ENZYME: CPT

## 2022-11-04 PROCEDURE — 99239 HOSP IP/OBS DSCHRG MGMT >30: CPT | Performed by: INTERNAL MEDICINE

## 2022-11-04 PROCEDURE — 82247 BILIRUBIN TOTAL: CPT

## 2022-11-04 PROCEDURE — 82248 BILIRUBIN DIRECT: CPT

## 2022-11-04 PROCEDURE — 700102 HCHG RX REV CODE 250 W/ 637 OVERRIDE(OP): Performed by: HOSPITALIST

## 2022-11-04 PROCEDURE — 700102 HCHG RX REV CODE 250 W/ 637 OVERRIDE(OP): Performed by: INTERNAL MEDICINE

## 2022-11-04 PROCEDURE — 700105 HCHG RX REV CODE 258: Performed by: FAMILY MEDICINE

## 2022-11-04 PROCEDURE — 83010 ASSAY OF HAPTOGLOBIN QUANT: CPT

## 2022-11-04 PROCEDURE — 80048 BASIC METABOLIC PNL TOTAL CA: CPT

## 2022-11-04 PROCEDURE — 36415 COLL VENOUS BLD VENIPUNCTURE: CPT

## 2022-11-04 RX ORDER — OMEPRAZOLE 20 MG/1
20 CAPSULE, DELAYED RELEASE ORAL 2 TIMES DAILY
Qty: 30 CAPSULE | Refills: 1 | Status: SHIPPED | OUTPATIENT
Start: 2022-11-04

## 2022-11-04 RX ADMIN — SENNOSIDES AND DOCUSATE SODIUM 2 TABLET: 50; 8.6 TABLET ORAL at 05:15

## 2022-11-04 RX ADMIN — OMEPRAZOLE 20 MG: 20 CAPSULE, DELAYED RELEASE ORAL at 05:15

## 2022-11-04 RX ADMIN — SODIUM CHLORIDE, POTASSIUM CHLORIDE, SODIUM LACTATE AND CALCIUM CHLORIDE: 600; 310; 30; 20 INJECTION, SOLUTION INTRAVENOUS at 05:19

## 2022-11-04 RX ADMIN — CITALOPRAM HYDROBROMIDE 10 MG: 20 TABLET ORAL at 05:15

## 2022-11-04 RX ADMIN — LISINOPRIL 20 MG: 20 TABLET ORAL at 05:15

## 2022-11-04 NOTE — CARE PLAN
The patient is Stable - Low risk of patient condition declining or worsening    Shift Goals  Clinical Goals: Hemoglobin,  Patient Goals: Comfort    Progress made toward(s) clinical / shift goals:    Problem: Knowledge Deficit - Standard  Goal: Patient and family/care givers will demonstrate understanding of plan of care, disease process/condition, diagnostic tests and medications  Outcome: Progressing  Note: POC discussed with patient, watching hemoglobin carefully, advised to use call light if wanting to get up     Problem: Hemodynamics  Goal: Patient's hemodynamics, fluid balance and neurologic status will be stable or improve  Outcome: Progressing  Note: Trending labs, ensuring labs are WNL       Patient is not progressing towards the following goals:

## 2022-11-04 NOTE — DISCHARGE SUMMARY
"Discharge Summary    CHIEF COMPLAINT ON ADMISSION  Chief Complaint   Patient presents with    Syncope     Reports syncopal episode x2 tonight. Pt. Reports feeling dizzy prior to episodes, denies CP, SOB, h/a now or prior to syncopal episodes. Pt. Spouse endorses that \"she told me while she was on the floor that she felt her heart pounding\".        Reason for Admission  Syncope     Admission Date  10/29/2022    CODE STATUS  Prior    HPI & HOSPITAL COURSE  This is a 56 yo female with a history of HTN, HLD, asthma, hip arthritis s/p QUINTIN earlier this year, phenylketonuria and obesity. She presented to hospital with syncope after having vomiting and diarrhea after initiation of doxycycline for a sinus infection. She does report having hematochezia, and was heme positive in the ER.  GI consulted and she had EGD which showed gastric and duodenal ulcers.  She received epinephrine injections and endoclip.  She received multiple transfusions and following her endoscopic procedure her hemoglobin would not increase.  IV iron was supplemented and H/H continued to remain low.  CTA abdomen and pelvis was unremarkable for evidence of bleeding.  She was taken back to [facility] and the areas where she had ulcers were not bleeding.  She received a total of 4 units of blood during this hospitalization.  She was checked for hemolytic anemia with LDH and indirect bilirubin without evidence for hemolysis.  Haptoglobin is pending at time of discharge.  HGB on day of discharge up to 8.1.  It is still uncertain why her HGB did not improve with transfusion.  She had never received blood transfusion until this hospitalization.  Once Iron fully was fully repleted, then her H/H started to climb.  She will stay on prilosec bid for 1 month and then continue daily thereafter.  She is medically cleared and requesting discharge home.       Therefore, she is discharged in good and stable condition to home with close outpatient follow-up.    The " patient met 2-midnight criteria for an inpatient stay at the time of discharge.    Discharge Date  11/4/2022    FOLLOW UP ITEMS POST DISCHARGE  PCP -     DISCHARGE DIAGNOSES  Principal Problem:    Anemia due to acute blood loss POA: Unknown  Active Problems:    Duodenal ulcer with hemorrhage POA: Yes    Elevated glucose POA: Unknown    Leukocytosis POA: Unknown    HTN (hypertension) POA: Unknown    HLD (hyperlipidemia) POA: Unknown    Syncope POA: Unknown    Hematochezia POA: Yes    Asthma POA: Unknown  Resolved Problems:    * No resolved hospital problems. *      FOLLOW UP  No future appointments.  Jie Starr D.O.  [address] [facility]  [address]  [facility] [address]  [phone]    Follow up in 2 week(s)        MEDICATIONS ON DISCHARGE     Medication List        START taking these medications        Instructions   omeprazole 20 MG delayed-release capsule  Commonly known as: PRILOSEC   Take 1 Capsule by mouth 2 times a day.  Dose: 20 mg            CONTINUE taking these medications        Instructions   albuterol 108 (90 Base) MCG/ACT Aers inhalation aerosol   Inhale 2 Puffs every 6 hours as needed for Shortness of Breath.  Dose: 2 Puff     ASPIRIN-ACETAMINOPHEN-CAFFEINE PO   Take 2 Tablets by mouth every 6 hours as needed for Headache.  Dose: 2 Tablet     BIOTIN PO   Take 1 Tablet by mouth every day.  Dose: 1 Tablet     citalopram 10 MG tablet  Commonly known as: CeleXA   Take 10 mg by mouth every day.  Dose: 10 mg     [employer] B12 GUMMIES PO   Take 2 Tablets by mouth every day.  Dose: 2 Tablet     docusate sodium 100 MG Caps  Commonly known as: COLACE   Take 100 mg by mouth 2 times a day as needed for Constipation.  Dose: 100 mg     doxycycline 100 MG Tabs  Commonly known as: VIBRAMYCIN   Take 100 mg by mouth 2 times a day. Pt started on 10/25/2022 for 10 day course  Dose: 100 mg     fenofibrate 160 MG tablet  Commonly known as: TRIGLIDE   Take 160 mg by mouth at bedtime.  Dose: 160 mg     fluticasone 50  MCG/ACT nasal spray  Commonly known as: FLONASE   Administer 1 Spray into affected nostril(S) as needed. Indications: Stuffy Nose  Dose: 1 Spray     lisinopril-hydrochlorothiazide 20-12.5 MG per tablet  Commonly known as: PRINZIDE   Take 1 Tablet by mouth every day.  Dose: 1 Tablet     Qvar RediHaler 40 MCG/ACT inhaler  Generic drug: beclomethasone HFA   Inhale 1 Puff 2 times a day.  Dose: 1 Puff     rosuvastatin 10 MG Tabs  Commonly known as: CRESTOR   Take 10 mg by mouth at bedtime.  Dose: 10 mg     traZODone 50 MG Tabs  Commonly known as: DESYREL   Take 50 mg by mouth every evening.  Dose: 50 mg     VITAMIN C PO   Take 1 Tablet by mouth every day.  Dose: 1 Tablet     VITAMIN D PO   Take 1 Tablet by mouth every day.  Dose: 1 Tablet     ZINC PO   Take 1 Tablet by mouth every day.  Dose: 1 Tablet            STOP taking these medications      meloxicam 7.5 MG Tabs  Commonly known as: MOBIC              Allergies  Allergies   Allergen Reactions    Food Swelling     Pumpkin seeds, tongue swelling     Keflex Palpitations     Pt reports that she also gets a headache     Penicillins      Burning sensation    Dairy Food Allergy      Patient states Metabolic Syndrome       DIET  No orders of the defined types were placed in this encounter.      ACTIVITY  As tolerated.  Weight bearing as tolerated    CONSULTATIONS  GI - [facility]    PROCEDURES  10/31 - [facility] - EGD  11/3 - [facility] - EGD    LABORATORY  Lab Results   Component Value Date    SODIUM 139 11/04/2022    POTASSIUM 3.5 (L) 11/04/2022    CHLORIDE 108 11/04/2022    CO2 23 11/04/2022    GLUCOSE 86 11/04/2022    BUN 12 11/04/2022    CREATININE 0.37 (L) 11/04/2022        Lab Results   Component Value Date    WBC 6.2 11/04/2022    HEMOGLOBIN 8.1 (L) 11/04/2022    HEMATOCRIT 25.0 (L) 11/04/2022    PLATELETCT 189 11/04/2022        Total time of the discharge process exceeds 35 minutes.

## 2022-11-04 NOTE — DISCHARGE INSTRUCTIONS
Prilosec instructions:  take 1 tab twice daily x 1 month then switch to once daily thereafter.    If you have any shortness of breath or dizziness, return to the hospital for further evaluation.  Avoid NSAIDs - no further mobic or excedrine.  Tylenol only for pain.

## 2022-11-06 LAB — HAPTOGLOB SERPL-MCNC: 108 MG/DL (ref 30–200)

## 2022-12-27 NOTE — DOCUMENTATION QUERY
[facility]                                                                       Query Response Note      PATIENT:               CASEY ROLLINS  ACCT #:                  [id number]  MRN:                     [id number]  :                      1965  ADMIT DATE:       10/29/2022 11:03 PM  DISCH DATE:        2022 9:00 AM  RESPONDING  PROVIDER #:        [id number]           QUERY TEXT:    Based on the noted clinical findings, your clinical judgement and after study, can the the diagnosis of sepsis be further clarified?  Please provide additional clinical indicators/SIRS criteria supportive of the documented diagnosis of sepsis.     Note: If an appropriate response is not listed below, please respond with a new note.    Sepsis - real or suspected infection plus 2 or more SIRS criteria  Temp <96.8 or >101  HR >90  RR >20  WBC <4,000 or > 12,000  >10% bandemia         The patient's Clinical Indicators include:  Patient was admitted for sepsis with source noted as UTI.  SIRS criteria noted:  tachycardia > 90; and WBC > 12,000.  H&P noted:  she has softly positive UTI and happened to have SIRS criteria meeting sepsis, however I believe the tachycardia is also secondary to underlying anemia...  10/30/22 PN:  mildly dirty UA, but w/o urinary symptoms - stop Rocephin; WBC likely secondary to GIB and tachycardia from anemia.  Pro walter neg (PN from 10/31/22 thru 11/3/22 same documentation re: SIRS as 10/30/22 PN)  Sepsis is not documented in the DC summary  Findings:  WBC 13.0 (adm) then->6.6->5.1->4.1->5.2; P 115; urinalysis showing 5-10 WBC's;  RBC's; sm leukocyte esterase; trace ketones; neg nitrite; lg occult blood; few bacteria  Treatment:  IVF resus; IV abx appropriate for source of sepsis; transfusion PRBC's for acute blood loss anemia due to poss GI bleed.      Thank you,  Jocelyne Chong, RN, BSN  Clinical    [facility]  [place]  Connect via [email or web address]  X [phone]  [email or web address]  Options provided:   -- Sepsis exists, (please document additional + SIRS criteria and clinical indicators)   -- Sepsis does not exist is ruled out - amended documentation in the medical record and updated problem list   -- Unable to provide additional clarity regarding the diagnosis   -- Other explanation, Please specify   -- Unable to determine      Query created by: Jocelyne Chong on 12/21/2022 8:32 PM    RESPONSE TEXT:    Sepsis does not exist is ruled out - amended documentation in the medical record and updated problem list          Electronically signed by:  SHERRI PATIÑO DO 12/27/2022 7:10 AM

## 2023-05-24 ENCOUNTER — OCCUPATIONAL MEDICINE (OUTPATIENT)
Dept: URGENT CARE | Facility: PHYSICIAN GROUP | Age: 58
End: 2023-05-24
Payer: COMMERCIAL

## 2023-05-24 ENCOUNTER — HOSPITAL ENCOUNTER (OUTPATIENT)
Dept: RADIOLOGY | Facility: MEDICAL CENTER | Age: 58
End: 2023-05-24
Attending: PHYSICIAN ASSISTANT
Payer: COMMERCIAL

## 2023-05-24 VITALS
WEIGHT: 194 LBS | RESPIRATION RATE: 16 BRPM | HEART RATE: 84 BPM | BODY MASS INDEX: 35.7 KG/M2 | DIASTOLIC BLOOD PRESSURE: 70 MMHG | HEIGHT: 62 IN | TEMPERATURE: 97.7 F | OXYGEN SATURATION: 97 % | SYSTOLIC BLOOD PRESSURE: 130 MMHG
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
## 2023-05-24 DIAGNOSIS — S82.001A CLOSED NONDISPLACED FRACTURE OF RIGHT PATELLA, UNSPECIFIED FRACTURE MORPHOLOGY, INITIAL ENCOUNTER: ICD-10-CM

## 2023-05-24 DIAGNOSIS — R07.81 RIB PAIN ON LEFT SIDE: ICD-10-CM

## 2023-05-24 DIAGNOSIS — S20.212A CONTUSION OF RIB ON LEFT SIDE, INITIAL ENCOUNTER: ICD-10-CM

## 2023-05-24 DIAGNOSIS — M25.561 ACUTE PAIN OF RIGHT KNEE: ICD-10-CM

## 2023-05-24 PROCEDURE — 71101 X-RAY EXAM UNILAT RIBS/CHEST: CPT | Mod: LT

## 2023-05-24 PROCEDURE — 3078F DIAST BP <80 MM HG: CPT | Performed by: PHYSICIAN ASSISTANT

## 2023-05-24 PROCEDURE — 73564 X-RAY EXAM KNEE 4 OR MORE: CPT | Mod: RT

## 2023-05-24 PROCEDURE — 99204 OFFICE O/P NEW MOD 45 MIN: CPT | Performed by: PHYSICIAN ASSISTANT

## 2023-05-24 PROCEDURE — 3075F SYST BP GE 130 - 139MM HG: CPT | Performed by: PHYSICIAN ASSISTANT

## 2023-05-24 RX ORDER — FLUTICASONE PROPIONATE 220 UG/1
AEROSOL, METERED RESPIRATORY (INHALATION)
COMMUNITY

## 2023-05-24 ASSESSMENT — FIBROSIS 4 INDEX: FIB4 SCORE: 1.57

## 2023-05-24 NOTE — LETTER
"EMPLOYEE’S CLAIM FOR COMPENSATION/ REPORT OF INITIAL TREATMENT  FORM C-4  PLEASE TYPE OR PRINT    EMPLOYEE’S CLAIM - PROVIDE ALL INFORMATION REQUESTED   First Name  Cindy Last Name  Anthony Birthdate                    1965                Sex  female Claim Number (Insurer’s Use Only)   Home Address  [address] Age  57 y.o. 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 (if applicable)  Walking to the bathroom & tripped over my feet.    How did this injury or occupational disease occur? (Be specific and answer in detail. Use additional sheet if necessary)  I was walking to the bathroom and tripped over my feet. hurting my Left rib, Right knee, left elbow   If you believe that you have an occupational disease, when did you first have knowledge of the disability and its relationship to your employment?  n/a Witnesses to the Accident (if applicable)  n/a      Nature of Injury or Occupational Disease  Sprain  Part(s) of Body Injured or Affected  Chest, Knee (R),     I CERTIFY THAT THE ABOVE IS TRUE AND CORRECT TO T HE BEST OF MY KNOWLEDGE AND THAT I HAVE PROVIDED THIS INFORMATION IN ORDER TO OBTAIN THE BENEFITS OF " [place], OR [place]).  I HEREBY AUTHORIZE ANY PHYSICIAN, CHIROPRACTOR, SURGEON, PRACTITIONER OR ANY OTHER PERSON, ANY HOSPITAL, INCLUDING [facility] OR [facility], ANY  MEDICAL SERVICE ORGANIZATION, ANY INSURANCE COMPANY, OR OTHER INSTITUTION OR ORGANIZATION TO RELEASE TO EACH OTHER, ANY MEDICAL OR OTHER INFORMATION, INCLUDING BENEFITS PAID OR PAYABLE, PERTINENT TO THIS INJURY OR DISEASE, EXCEPT INFORMATION RELATIVE TO DIAGNOSIS, TREATMENT AND/OR COUNSELING FOR AIDS, PSYCHOLOGICAL CONDITIONS, ALCOHOL OR CONTROLLED SUBSTANCES, FOR WHICH I MUST GIVE SPECIFIC AUTHORIZATION.  A PHOTOSTAT OF THIS AUTHORIZATION SHALL BE VALID AS THE ORIGINAL.       Date   Place Employee’s Original or  *Electronic Signature   THIS REPORT MUST BE COMPLETED AND MAILED WITHIN 3 WORKING DAYS OF TREATMENT   Place  [facility]  Name of Facility  [facility]   Date  5/24/2023 Diagnosis and Description of Injury or Occupational Disease  (S82.001A) Closed nondisplaced fracture of right patella, unspecified fracture morphology, initial encounter  (S20.212A) Contusion of rib on left side, initial encounter  (W18.30XA) Ground-level fall Is there evidence that the injured employee was under the influence of alcohol and/or another controlled substance at the time of accident?  ? No ? Yes (if yes, please explain)   Hour  5:02 PM   Diagnoses of Closed nondisplaced fracture of right patella, unspecified fracture morphology, initial encounter, Contusion of rib on left side, initial encounter, and Ground-level fall were pertinent to this visit. No   Treatment  - right knee immobilizer   - referral to Orthopedics and Occupational Medicine   - rest, ice application, Tylenol, deep breathing exercises   - work restrictions per D39   - follow up in 5 days     Have you advised the patient to remain off work five days or     more?   "  X-Ray Findings  Positive   ? Yes Indicate dates:   From   To      From information given by the employee, together with medical evidence, can        you directly connect this injury or occupational disease as job incurred?  Yes ? No If no, is the injured employee capable of:  ? full duty  No ? modified duty  Yes   Is additional medical care by a physician indicated?  Yes If modified duty, specify any limitations / restrictions  Any duty following work restrictions per D39    Do you know of any previous injury or disease contributing to this condition or occupational disease?  ? Yes ? No (Explain if yes)                          No   Date  5/24/2023 Print Health Care Provider's  Name  Kirstin Lemon P.A.-C. I certify that the employer’s copy of  this form was delivered to the employer on:   Address  [address]  [facility] [address] [facility] Insurer’s Use Only     [facility]  [address]    Provider’s Tax ID Number  [id number] Telephone  Dept: [phone]             Health Care Provider’s Original or Electronic Signature  e-SignKIRSTIN LEMON P.A.-C. Degree (MD,DO, DC,PASravanthiC,APRN)        * Complete and attach Release of Information (Form C-4A) when injured employee signs C-4 Form electronically  ORIGINAL - TREATING HEALTHCARE PROVIDER PAGE 2 - INSURER/TPA PAGE 3 - EMPLOYER PAGE 4 - EMPLOYEE             Form C-4 (rev.08/21)           BRIEF DESCRIPTION OF RIGHTS AND BENEFITS  (Pursuant to [place] 616[place].050)    Notice of Injury or Occupational Disease (Incident Report Form C-1): If an injury or occupational disease (OD) arises out of and in the course of employment, you must provide written notice to your employer as soon as practicable, but no later than 7 days after the accident or OD. Your employer shall maintain a sufficient supply of the required forms.    Claim for Compensation (Form C-4): If medical treatment is sought, the form C-4 is available at the place of initial treatment. A completed \"Claim for Compensation\" " (Form C-4) must be filed within 90 days after an accident or OD. The treating physician or chiropractor must, within 3 working days after treatment, complete and mail to the employer, the employer's insurer and third-party , the Claim for Compensation.    Medical Treatment: If you require medical treatment for your on-the-job injury or OD, you may be required to select a physician or chiropractor from a list provided by your workers’ compensation insurer, if it has contracted with an Organization for Managed Care (MCO) or Preferred Provider Organization (PPO) or providers of health care. If your employer has not entered into a contract with an MCO or PPO, you may select a physician or chiropractor from the Panel of Physicians and Chiropractors. Any medical costs related to your industrial injury or OD will be paid by your insurer.    Temporary Total Disability (TTD): If your doctor has certified that you are unable to work for a period of at least 5 consecutive days, or 5 cumulative days in a 20-day period, or places restrictions on you that your employer does not accommodate, you may be entitled to TTD compensation.    Temporary Partial Disability (TPD): If the wage you receive upon reemployment is less than the compensation for TTD to which you are entitled, the insurer may be required to pay you TPD compensation to make up the difference. TPD can only be paid for a maximum of 24 months.    Permanent Partial Disability (PPD): When your medical condition is stable and there is an indication of a PPD as a result of your injury or OD, within 30 days, your insurer must arrange for an evaluation by a rating physician or chiropractor to determine the degree of your PPD. The amount of your PPD award depends on the date of injury, the results of the PPD evaluation, your age and wage.    Permanent Total Disability (PTD): If you are medically certified by a treating physician or chiropractor as permanently and  totally disabled and have been granted a PTD status by your insurer, you are entitled to receive monthly benefits not to exceed 66 2/3% of your average monthly wage. The amount of your PTD payments is subject to reduction if you previously received a lump-sum PPD award.    Vocational Rehabilitation Services: You may be eligible for vocational rehabilitation services if you are unable to return to the job due to a permanent physical impairment or permanent restrictions as a result of your injury or occupational disease.    Transportation and Per Derik Reimbursement: You may be eligible for travel expenses and per derik associated with medical treatment.    Reopening: You may be able to reopen your claim if your condition worsens after claim closure.     Appeal Process: If you disagree with a written determination issued by the insurer or the insurer does not respond to your request, you may appeal to the Department of Administration, , by following the instructions contained in your determination letter. You must appeal the determination within 70 days from the date of the determination letter at [address], [address], [place] [address], or [address], [address], [place] [address]. If you disagree with the  decision, you may appeal to the Department of Administration, . You must file your appeal within 30 days from the date of the  decision letter at [address], [address], [place], [place] [address], or [address], [address], [place] [address]. If you disagree with a decision of an , you may file a petition for judicial review with the District Court. You must do so within 30 days of the Appeal Officer’s decision. You may be represented by an  at your own expense or you may contact the [facility] for possible representation.    [place]  for Injured Workers (IW): If you  disagree with a  decision, you may request that [facility] represent you without charge at an  Hearing. For information regarding denial of benefits, you may contact the [facility] at: [address], [address], [place] [address], [phone], or [address], [address], [place] [address], [phone]    To File a Complaint with the Division: If you wish to file a complaint with the  of the Division of Industrial Relations (DIR),  please contact the Workers’ Compensation Section, [address], [address], [place], [place] [address], telephone [phone]) [phone], or [address], [address], [place], [place] [address], telephone [phone].    For assistance with Workers’ Compensation Issues: You may contact the [facility] for Consumer Health Assistance, [address], [address], [place], [place] [address], Toll Free 1-866.227.9358, Web site: [email or web address].gov/Programs/AIRAM E-mail: [email or web address]              __________________________________________________________________                                    _________________            Employee Name / Signature                                                                                                                            Date                                                                                                                                                                                                                              D-2 (rev. 10/20)

## 2023-05-24 NOTE — LETTER
[facility] [address] [address]  Phone:  [phone] - Fax:  [phone]   [facility] Progress Report and Disability Certification  Date of Service: 5/24/2023   No Show:  No  Date / Time of Next Visit: 5/29/2023 @12PM   Claim Information   Patient Name: Cindy Cruz  Claim Number:     Employer:   [employer] Date of Injury: 5/16/2023     Insurer / TPA:   Misc ID / SSN:     Occupation: Teacher  Diagnosis: Diagnoses of Closed nondisplaced fracture of right patella, unspecified fracture morphology, initial encounter, Contusion of rib on left side, initial encounter, and Ground-level fall were pertinent to this visit.    Medical Information   Related to Industrial Injury? Yes    Subjective Complaints:  DOI: 5/16/23   Initial Visit today 5/24/23  ALIS: Patient was walking on wood floor, lost her footing, and fell to the hard ground. She fell to her left torso, left elbow, and right knee. Left elbow pain resolved and is better. She still has right knee pain and bruising mostly over the knee cap. She has a slight limp. She has localized left sided rib pain near the breast line. Hurts with lifting, twisting, coughing. Denies any head injury or loss of consciousness.  He has been applying ice and Tylenol with some relief.  Denies any prior knee, elbow, rib injuries.  Denies a second job.   Objective Findings: Physical Exam  Vitals reviewed.   Constitutional:       General: She is not in acute distress.     Appearance: Normal appearance. She is not ill-appearing or toxic-appearing.   Eyes:      Conjunctiva/sclera: Conjunctivae normal.      Pupils: Pupils are equal, round, and reactive to light.   Cardiovascular:      Rate and Rhythm: Normal rate and regular rhythm.   Pulmonary:      Effort: Pulmonary effort is normal.      Breath sounds: Normal breath sounds.   Chest:      Chest wall: No deformity or crepitus.           Comments: Left  anterolateral rib tenderness   Musculoskeletal:      Left elbow: No swelling or deformity. Normal range of motion. No tenderness.      Cervical back: Normal range of motion and neck supple. No rigidity or tenderness.      Right knee: Bony tenderness (patella) present. No swelling, deformity or effusion. Normal range of motion. No LCL laxity or MCL laxity.   Skin:     General: Skin is warm and dry.   Neurological:      General: No focal deficit present.      Mental Status: She is alert and oriented to person, place, and time.   Psychiatric:         Mood and Affect: Mood normal.         Behavior: Behavior normal.    Pre-Existing Condition(s):     Assessment:   Initial Visit    Status: Additional Care Required  Permanent Disability:No    Plan:      Diagnostics: X-ray    Comments:  Plan:   - right knee immobilizer   - referral to Orthopedics and Occupational Medicine   - rest, ice application, Tylenol, deep breathing exercises   - work restrictions per D39   - follow up in 5 days     Disability Information   Status:      From:  5/24/2023  Through: 5/29/2023 Restrictions are: Temporary   Physical Restrictions   Sitting:    Standing:    Stooping:    Bending:      Squatting:    Walking:  < or = to 1 hr/day Climbing:    Pushing:      Pulling:    Other:    Reaching Above Shoulder (L):   Reaching Above Shoulder (R):       Reaching Below Shoulder (L):    Reaching Below Shoulder (R):      Not to exceed Weight Limits   Carrying(hrs):   Weight Limit(lb):   Lifting(hrs):   Weight  Limit(lb):     Comments: No lifting, pulling, pushing more than 10 lbs.     Repetitive Actions   Hands: i.e. Fine Manipulations from Grasping:     Feet: i.e. Operating Foot Controls:     Driving / Operate Machinery:     Health Care Provider’s Original or Electronic Signature  Josesito Lemon P.A.-C. Health Care Provider’s Original or Electronic Signature    George Gauthier DO MPH     Clinic Name / Location: [facility] [address]  [place] [address] Clinic Phone Number: Dept: [phone]   Appointment Time: 4:30 Pm Visit Start Time: 5:02 PM   Check-In Time:  4:58 Pm Visit Discharge Time:  6:41 PM   Original-Treating Physician or Chiropractor    Page 2-Insurer/TPA    Page 3-Employer    Page 4-Employee

## 2023-05-25 NOTE — PROGRESS NOTES
"Subjective:     Cindy Cruz is a 57 y.o. female who presents for Rib Injury (NEW WC DOI 5/16/2023 (L) ribcage and (R) knee from a fall)      DOI: 5/16/23   Initial Visit today 5/24/23  ALIS: Patient was walking on wood floor, lost her footing, and fell to the hard ground. She fell to her left torso, left elbow, and right knee. Left elbow pain resolved and is better. She still has right knee pain and bruising mostly over the knee cap. She has a slight limp. She has localized left sided rib pain near the breast line. Hurts with lifting, twisting, coughing. Denies any head injury or loss of consciousness.  He has been applying ice and Tylenol with some relief.  Denies any prior knee, elbow, rib injuries.  Denies a second job.    PMH:   No pertinent past medical history to this problem  MEDS:  Medications were reviewed in EMR  ALLERGIES:  Allergies were reviewed in EMR  SOCHX:  Works as a Teacher   FH:   No pertinent family history to this problem       Objective:     /70 (BP Location: Right arm, Patient Position: Sitting, BP Cuff Size: Adult long)   Pulse 84   Temp 36.5 °C (97.7 °F) (Temporal)   Resp 16   Ht 1.575 m (5' 2\")   Wt 88 kg (194 lb)   SpO2 97%   BMI 35.48 kg/m²     Physical Exam  Vitals reviewed.   Constitutional:       General: She is not in acute distress.     Appearance: Normal appearance. She is not ill-appearing or toxic-appearing.   Eyes:      Conjunctiva/sclera: Conjunctivae normal.      Pupils: Pupils are equal, round, and reactive to light.   Cardiovascular:      Rate and Rhythm: Normal rate and regular rhythm.   Pulmonary:      Effort: Pulmonary effort is normal.      Breath sounds: Normal breath sounds.   Chest:      Chest wall: No deformity or crepitus.           Comments: Left anterolateral rib tenderness   Musculoskeletal:      Left elbow: No swelling or deformity. Normal range of motion. No tenderness.      Cervical back: Normal range of motion and neck supple. No " rigidity or tenderness.      Right knee: Bony tenderness (patella) present. No swelling, deformity or effusion. Normal range of motion. No LCL laxity or MCL laxity.   Skin:     General: Skin is warm and dry.   Neurological:      General: No focal deficit present.      Mental Status: She is alert and oriented to person, place, and time.   Psychiatric:         Mood and Affect: Mood normal.         Behavior: Behavior normal.         RADIOLOGY RESULTS   [id number] (WITH 1-VIEW CXR) LEFT    Result Date: 5/24/2023 5/24/2023 5:42 PM HISTORY/REASON FOR EXAM:  Pain following trauma. TECHNIQUE/EXAM DESCRIPTION AND NUMBER OF VIEWS:  5 images of the left ribs and chest. COMPARISON: None. FINDINGS: No acute displaced rib fractures are identified. No pneumothorax. The visualized lungs are clear. Unremarkable cardiomediastinal silhouette. Left nephrolithiasis.     1.  No displaced rib fractures or pneumothorax.   2.  Left nephrolithiasis.    DX-KNEE COMPLETE 4+ RIGHT    Result Date: 5/24/2023 5/24/2023 5:42 PM HISTORY/REASON FOR EXAM:  Pain/Deformity Following Trauma; patellar pain TECHNIQUE/EXAM DESCRIPTION AND NUMBER OF VIEWS:  4 views of the RIGHT knee. COMPARISON: None FINDINGS: Soft tissue swelling about the anterior knee. Ill-defined lucencies in the medial pole of the patella. Mild to moderate osteoarthritis No knee joint effusion.     Ill-defined lucencies in the medial pole of the patella could relate to nondisplaced fractures.           Assessment/Plan:       1. Closed nondisplaced fracture of right patella, unspecified fracture morphology, initial encounter  - DX-KNEE COMPLETE 4+ RIGHT; Future  - Referral to Orthopedics  - Referral to Occupational Medicine    2. Contusion of rib on left side, initial encounter  - [id number] (WITH 1-VIEW CXR) LEFT; Future  - Referral to Occupational Medicine    3. Ground-level fall  - Referral to Occupational Medicine    Other orders  - fluticasone (FLOVENT HFA) 220  MCG/ACT Aerosol; Flovent  mcg/actuation aerosol inhaler   INHALE 2 PUFFS INTO THE LUNGS TWICE A DAY FOR 90 DAYS    X-ray results per radiologist interpretation above. I personally reviewed images and radiologist report       FROM 5/24/2023 TO 5/29/2023  No lifting, pulling, pushing more than 10 lbs.   Plan:   - right knee immobilizer   - referral to Orthopedics and Occupational Medicine   - rest, ice application, Tylenol, deep breathing exercises   - work restrictions per D39   - follow up in 5 days     Differential diagnosis, natural history, supportive care, and indications for immediate follow-up discussed.

## 2023-05-29 ENCOUNTER — OCCUPATIONAL MEDICINE (OUTPATIENT)
Dept: URGENT CARE | Facility: PHYSICIAN GROUP | Age: 58
End: 2023-05-29
Payer: COMMERCIAL

## 2023-05-29 VITALS
RESPIRATION RATE: 18 BRPM | DIASTOLIC BLOOD PRESSURE: 64 MMHG | HEART RATE: 76 BPM | WEIGHT: 194 LBS | BODY MASS INDEX: 35.48 KG/M2 | TEMPERATURE: 97.4 F | OXYGEN SATURATION: 96 % | SYSTOLIC BLOOD PRESSURE: 124 MMHG

## 2023-05-29 DIAGNOSIS — S82.001D CLOSED NONDISPLACED FRACTURE OF RIGHT PATELLA WITH ROUTINE HEALING, UNSPECIFIED FRACTURE MORPHOLOGY, SUBSEQUENT ENCOUNTER: ICD-10-CM

## 2023-05-29 DIAGNOSIS — S20.212D CONTUSION OF RIB ON LEFT SIDE, SUBSEQUENT ENCOUNTER: ICD-10-CM

## 2023-05-29 PROCEDURE — 3078F DIAST BP <80 MM HG: CPT | Performed by: FAMILY MEDICINE

## 2023-05-29 PROCEDURE — 3074F SYST BP LT 130 MM HG: CPT | Performed by: FAMILY MEDICINE

## 2023-05-29 PROCEDURE — 99213 OFFICE O/P EST LOW 20 MIN: CPT | Performed by: FAMILY MEDICINE

## 2023-05-29 ASSESSMENT — FIBROSIS 4 INDEX: FIB4 SCORE: 1.57

## 2023-05-29 NOTE — PROGRESS NOTES
Subjective     Cindy Cruz is a 57 y.o. female who presents with Knee Injury (WC FV (R) knee and states she feels somewhat better)      DOI: 5/16/2023  F/u visit right patella fracture and left rib contusion. ALIS: fall while at work  She has somewhat improved, particularly her ribs. Bruising to knee. She has remained in knee immobilizer. Has not seen ortho yet. Pain is manageable. She has been ambulating without crutches. No other aggravating or alleviating factors.       HPI    ROS           Objective     /64 (BP Location: Right arm, Patient Position: Sitting, BP Cuff Size: Adult long)   Pulse 76   Temp 36.3 °C (97.4 °F) (Temporal)   Resp 18   Wt 88 kg (194 lb)   SpO2 96%   BMI 35.48 kg/m²      Physical Exam    Chest: no point tenderness left ribs, no deformity. Clear to auscultation.  Right knee: tender patella, no ROM testing. Distal neuro/vascular intact.                        Assessment & Plan      Urgent care visit 5/24/2023 reviewed  X-rays 5/24/2023 reviewed    1. Closed nondisplaced fracture of right patella with routine healing, unspecified fracture morphology, subsequent encounter      2. Contusion of rib on left side, subsequent encounter    Work restrictions on D39.  Follow-up with orthopedic surgery and occupational medicine.

## 2023-05-29 NOTE — LETTER
[facility] [address] [place] [address]  Phone:  [phone] - Fax:  [phone]   [facility] Progress Report and Disability Certification  Date of Service: 5/29/2023   No Show:  No  Date / Time of Next Visit: 6/12/2023 [facility]     Claim Information   Patient Name: Cindy Cruz  Claim Number:     Employer:   [employer]  Date of Injury: 5/16/2023     Insurer / TPA: Samuel  ID / SSN:     Occupation: Teacher  Diagnosis: Diagnoses of Closed nondisplaced fracture of right patella with routine healing, unspecified fracture morphology, subsequent encounter and Contusion of rib on left side, subsequent encounter were pertinent to this visit.    Medical Information   Related to Industrial Injury? Yes    Subjective Complaints:  DOI: 5/16/2023  F/u visit right patella fracture and left rib contusion. LAIS: fall while at work  She has somewhat improved, particularly her ribs. Bruising to knee. She has remained in knee immobilizer. Has not seen ortho yet. Pain is manageable. She has been ambulating without crutches. No other aggravating or alleviating factors.     Objective Findings: Chest: no point tenderness left ribs, no deformity. Clear to auscultation.  Right knee: tender patella, no ROM testing. Distal neuro/vascular intact.        Pre-Existing Condition(s):     Assessment:   Initial Visit    Status: Additional Care Required  Permanent Disability:No    Plan:   Comments:ice, otc nsaid, knee brace. f/u with orthopedic surgery and occupational medicine    Diagnostics:      Comments:       Disability Information   Status: Released to Restricted Duty    From:  5/29/2023  Through: 6/12/2023 Restrictions are: Temporary   Physical Restrictions   Sitting:    Standing:    Stooping:    Bending:      Squatting:    Walking:  < or = to 1 hr/day Climbing:    Pushing:      Pulling:    Other:    Reaching Above Shoulder (L):   Reaching Above  Shoulder (R):       Reaching Below Shoulder (L):    Reaching Below Shoulder (R):      Not to exceed Weight Limits   Carrying(hrs):   Weight Limit(lb): < or = to 10 pounds Lifting(hrs):   Weight  Limit(lb): < or = to 10 pounds   Comments:      Repetitive Actions   Hands: i.e. Fine Manipulations from Grasping:     Feet: i.e. Operating Foot Controls:     Driving / Operate Machinery:     Health Care Provider’s Original or Electronic Signature  Scooter Castle M.D. Health Care Provider’s Original or Electronic Signature    George Gauthier DO MPH     Clinic Name / Location: [address] [address] Clinic Phone Number: Dept: [phone]   Appointment Time: 12:00 Pm Visit Start Time: 12:02 PM   Check-In Time:  11:50 Am Visit Discharge Time:  12:38 PM   Original-Treating Physician or Chiropractor    Page 2-Insurer/TPA    Page 3-Employer    Page 4-Employee

## 2024-07-17 NOTE — ED NOTES
Attempted to call report for 2x, RN unavailable, will call back  
Attempted to call report, receiving RN unavailable at this time, will call back;  
Family at bedside.  
MD at bedside.   
MD at bedside.   
Med rec updated and complete, per pt   Allergies reviewed, per pt  Interviewed pt with  at bedside with permission from pt.    
PIV placed, blood and 1st set of blood cultures collected & sent to lab, Pt denied having any needs, no distress noted;    CXR bedside;  
Pt medicated, taken to restroom via WC - spouse accompanied c her;  
denies pain/discomfort (Rating = 0)

## 2025-02-21 ENCOUNTER — NON-PROVIDER VISIT (OUTPATIENT)
Dept: URGENT CARE | Facility: CLINIC | Age: 60
End: 2025-02-21

## 2025-02-21 DIAGNOSIS — Z11.1 VISIT FOR TB SKIN TEST: ICD-10-CM

## 2025-02-21 PROCEDURE — 86580 TB INTRADERMAL TEST: CPT | Performed by: PHYSICIAN ASSISTANT

## 2025-02-23 ENCOUNTER — NON-PROVIDER VISIT (OUTPATIENT)
Dept: URGENT CARE | Facility: CLINIC | Age: 60
End: 2025-02-23

## 2025-02-23 LAB — TB WHEAL 3D P 5 TU DIAM: NORMAL MM

## 2025-02-23 PROCEDURE — 99999 PR NO CHARGE: CPT | Performed by: NURSE PRACTITIONER

## 2025-04-25 NOTE — PROCEDURES
[address] [address]                             PROCEDURE NOTE      PATIENT NAME: VIKRAM DAO            : 1959  MED REC NO: [id number]                       ROOM: [facility]  ACCOUNT NO: [id number]                       ADMIT DATE: 2025  PROVIDER: Og Salazar MD      DATE OF PROCEDURE:  2025    SURGEON:  Og Salazar MD    PROCEDURE TYPE:  Colonoscopy.    INSTRUMENT:  Video colonoscope.    MEDICATIONS:  Propofol, see Anesthesia documentation report.    BRIEF HISTORY:  The patient is a 66-year-old with a history of colon polyps.  She presents today for high-risk screening colonoscopy.  The procedure was discussed with her.  Following discussion, she expressed understanding and did wish to proceed.    DESCRIPTION OF PROCEDURE:  The patient arrived to [facility] Department as an outpatient.  She was placed on the appropriate monitoring devices.  She was placed in the left lateral decubitus position.  Sedation provided by nurse anesthetist using propofol.  Following performance of unremarkable digital rectal exam, the Olympus video colonoscope was inserted gently through the anal canal and into the rectum.  The scope was advanced through the length of the colon into the base of the cecum identified by the presence of the appendiceal orifice, cecal strap, and ileocecal valve.  Cecum was carefully inspected.  This was normal.  The ileocecal valve was normal.  The scope was advanced across the valve into the distal ileum, visualized portion normal, brought back to the cecum, then up to the ascending colon, across hepatic flexure, through the transverse colon, across the splenic flexure, down to the descending colon to the sigmoid colon, across the rectosigmoid junction into the rectum.  Near hepatic flexure 2 to 3 mm polyp, biopsied, and removed with cold forceps.  A couple of  DATE OF PROCEDURE:  10/31/2022     EGD with bleeding control and biopsy as well as a colonoscopy with biopsy   report.     PROCEDURE:  EGD with bleeding control and biopsy as well as a colonoscopy with   biopsy.     ENDOSCOPIST:  Vasile Negron MD     INDICATIONS:  GI bleeding and anemia.     MEDICATIONS:  The patient received monitored anesthesia care by Dr. Rodrigo Hope, please see anesthesia flow sheets for details.     DESCRIPTION OF PROCEDURE:  The patient was informed in detail of the   indications as well as the risks, the benefits, the alternatives of the   procedure and she indicates understanding of all these and agrees to proceed.    Consent is signed and placed on chart.  After the patient was deemed   adequately sedated, a standard Olympus flexible gastroscope was lubricated and   gently placed through a bite block over the top of her tongue down into her   esophagus.  From here, the scope was carefully maneuvered through the   esophagus into the stomach and any residual fluid was thoroughly suctioned.    There was a large amount of dark fluid in the stomach to be removed.  The   scope was then traversed through the body of the stomach into the prepyloric   area where a small prepyloric ulcer without high risk stigmata was noted.  The   scope was then traversed into the duodenal bulb and fresh blood clot was   noted.  We were able to maneuver past this and the farthest reach of the   endoscope with second portion of the duodenum.  Air was insufflated.    Photographs obtained.  The scope was then slowly and carefully withdrawn   looking mucosa in a circumferential methodic manner.  Upon withdrawal,   inspection in the duodenal bulb specifically the distal duodenal bulb, there   was a large deep ulceration with an adherent clot.  This area was injected   with a total of five 1 mL injections around the ulcer itself with good   blanching.  We were then able to suck off most of the clot; however, did not    remove the clot completely.  There is no post-maneuver bleeding, then an   endoclip was placed at the site with good results and no posttreatment   bleeding.  Scope was then withdrawn back into the stomach, whereby careful   inspection again revealed a prepyloric ulcer and retroflexion was performed.    No additional findings were noted.  Biopsies were taken in the stomach to rule   out H. pylori.  The scope was then withdrawn back into the esophagus after   suctioning out excess gastric air and no further abnormalities were noted upon   withdrawal.  The EGD procedure was then completed.  We then turned our   attention to perform the colonoscopy and a standard Olympus variable stiffness   colonoscope was lubricated and gently placed in the anus and traversing all   the way to the cecum with ease.  Quality of preparation was fair, adequate.    Photographs were obtained.  The scope was then slowly and carefully withdrawn   looking mucosa in a circumferential methodic manner throughout.  Cecal   landmarks identified included the ileocecal valve, cecal strap, and   appendiceal orifice.  In the sigmoid colon, a small, approximately 4 mm   sessile polyp was noted and removed via cold biopsy forceps technique.    Retroflexion was performed in the rectum.  No additional findings were noted.    The scope was then withdrawn and the procedure completed.     FINDINGS:    1.  Gastric prepyloric ulcer without stigmata.  2.  Duodenal ulcer with high risk stigmata in the form of the adherent clot   treated with epinephrine and clipping.  3.  Sigmoid colon polyp.     COMPLICATIONS:  None.     TISSUE/BIOPSIES:    1.  Gastric biopsies to rule out H. pylori.  2.  Sigmoid colon polyp.     THERAPY:  Bleeding control performed in the duodenum as above with epinephrine   injections as well as Endoclipping.     IMPRESSION/PLAN/MEDICAL DECISION MAKIN.  Gastrointestinal bleeding.  At this point of the procedures, I have to   suspect  bleeding came from this large high risk duodenal bulb ulcer, now   status post therapy.  At this point, we can advance her diet to full liquids.    She still has about a 20% chance of rebleeding in the next 72 hours.  She   should remain on IV PPIs for now. wWe will continue to monitor with frequent   hemoglobins and we will follow with you for now.  Can advance diet tomorrow if   no signs of recurrent bleeding.  Await biopsy results to rule out H. pylori.    Meanwhile, she will need to avoid all NSAIDs.  We will discuss further with   her.  2.  Acute post-hemorrhagic anemia.  See discussion above.  3.  Syncope.  4.  Duodenal ulcer.        ______________________________  MD JOSH MEDRANO/BECCA    DD:  10/31/2022 09:14  DT:  10/31/2022 09:54    Job#:  [id number]

## (undated) DEVICE — SPONGE GAUZE NON-STERILE 4X4 - (2000/CA 10PK/CA)

## (undated) DEVICE — DRESSING POST OP BORDER 4 X 10 (5EA/BX)

## (undated) DEVICE — GLOVE BIOGEL PI ORTHO SZ 7.5 PF LF (40PR/BX)

## (undated) DEVICE — KIT  I.V. START (100EA/CA)

## (undated) DEVICE — KIT HIP PREP IM ENCHANCE TOTAL (5EA/BX)

## (undated) DEVICE — SPONGE GAUZESTER 4 X 4 4PLY - (128PK/CA)

## (undated) DEVICE — ELECTRODE DUAL RETURN W/ CORD - (50/PK)

## (undated) DEVICE — SLEEVE, VASO, THIGH, MED

## (undated) DEVICE — TUBING CLEARLINK DUO-VENT - C-FLO (48EA/CA)

## (undated) DEVICE — WATER IRRIGATION STERILE 1000ML (12EA/CA)

## (undated) DEVICE — HANDPIECE 10FT INTPLS SCT PLS IRRIGATION HAND CONTROL SET (6/PK)

## (undated) DEVICE — SUCTION INSTRUMENT YANKAUER BULBOUS TIP W/O VENT (50EA/CA)

## (undated) DEVICE — BLADE SAGITTAL SAW DUAL CUT 25.0 X 90.0 X 1.27MM (1/EA)

## (undated) DEVICE — DEVICE HEMOSTATIC CLIPPING RESOLUTION 360 DEGREES (20EA/BX)

## (undated) DEVICE — BLOCK BITE ENDOSCOPIC 2809 - (100/BX) INTERMEDIATE

## (undated) DEVICE — LENS/HOOD FOR SPACESUIT - (32/PK) PEEL AWAY FACE

## (undated) DEVICE — DRILL BIT

## (undated) DEVICE — MASK WITH FACE SHIELD (25/BX 4BX/CA)

## (undated) DEVICE — TOWEL STOP TIMEOUT SAFETY FLAG (40EA/CA)

## (undated) DEVICE — GLOVE BIOGEL INDICATOR SZ 7.5 SURGICAL PF LTX - (50PR/BX 4BX/CA)

## (undated) DEVICE — DRAPE SURGICAL U 77X120 - (10/CA)

## (undated) DEVICE — CANISTER SUCTION 3000ML MECHANICAL FILTER AUTO SHUTOFF MEDI-VAC NONSTERILE LF DISP  (40EA/CA)

## (undated) DEVICE — SUTURE 5 TI-CRON HOS-14 - (36/BX)

## (undated) DEVICE — SODIUM CHL. IRRIGATION 0.9% 3000ML (4EA/CA 65CA/PF)

## (undated) DEVICE — Device

## (undated) DEVICE — SET LEADWIRE 5 LEAD BEDSIDE DISPOSABLE ECG (1SET OF 5/EA)

## (undated) DEVICE — CATHERTER CLEAR SINGLE USE INJECTION THERAPY NEEDLE 25GA X 4MM  2.3MM X 240CM (5EA/BX)

## (undated) DEVICE — SYRINGE SAFETY 3 ML 18 GA X 1 1/2 BLUNT LL (100/BX 8BX/CA)

## (undated) DEVICE — HEAD HOLDER JUNIOR/ADULT

## (undated) DEVICE — SYRINGE DISP. 60 CC LL - (30/BX, 12BX/CA)**WHEN THESE ARE GONE ORDER #500206**

## (undated) DEVICE — TEETHGUARD ENT -2BX MIN ORDER- (6EA/BX)

## (undated) DEVICE — MIXER BONE CEMENT REVOLUTION - W/FEMORAL PRESSURIZER (6/CA)

## (undated) DEVICE — GLOVE BIOGEL SZ 8 SURGICAL PF LTX - (50PR/BX 4BX/CA)

## (undated) DEVICE — ELECTRODE 850 FOAM ADHESIVE - HYDROGEL RADIOTRNSPRNT (50/PK)

## (undated) DEVICE — NEPTUNE 4 PORT MANIFOLD - (20/PK)

## (undated) DEVICE — KIT CUSTOM PROCEDURE SINGLE FOR ENDO  (15/CA)

## (undated) DEVICE — GLOVE, LITE (PAIR)

## (undated) DEVICE — FORCEP RADIAL JAW 4 STANDARD CAPACITY W/NEEDLE 240CM (40EA/BX)

## (undated) DEVICE — TUBE SUCTION YANKAUER  1/4 X 6FT (20EA/CA)"

## (undated) DEVICE — PACK MINOR BASIN - (2EA/CA)

## (undated) DEVICE — LACTATED RINGERS INJ 1000 ML - (14EA/CA 60CA/PF)

## (undated) DEVICE — PROTECTOR ULNA NERVE - (36PR/CA)

## (undated) DEVICE — GOWN SURGEONS LARGE - (32/CA)

## (undated) DEVICE — DRAPE SURG STERI-DRAPE 7X11OD - (40EA/CA)

## (undated) DEVICE — TIP INTPLS HFLO ML ORFC BTRY - (12/CS)  FOR SURGILAV

## (undated) DEVICE — DRAPE STRLE REG TOWEL 18X24 - (10/BX 4BX/CA)"

## (undated) DEVICE — MASK ANESTHESIA ADULT  - (100/CA)

## (undated) DEVICE — SODIUM CHL IRRIGATION 0.9% 1000ML (12EA/CA)

## (undated) DEVICE — BOVIE BLADE COATED - (50/PK)

## (undated) DEVICE — DRAPE LARGE 3 QUARTER - (20/CA)

## (undated) DEVICE — SENSOR OXIMETER ADULT SPO2 RD SET (20EA/BX)

## (undated) DEVICE — SET EXTENSION WITH 2 PORTS (48EA/CA) ***PART #2C8610 IS A SUBSTITUTE*****

## (undated) DEVICE — KIT ANESTHESIA W/CIRCUIT & 3/LT BAG W/FILTER (20EA/CA)

## (undated) DEVICE — GOWN WARMING STANDARD FLEX - (30/CA)

## (undated) DEVICE — HOOD, SURGICAL

## (undated) DEVICE — STOCKINETTE IMPERVIOUS 12X48 - STERILELF (10/CA)"

## (undated) DEVICE — JELLY SURGILUBE STERILE FOIL 5 GM (150EA/BX)

## (undated) DEVICE — SUTURE 0 VICRYL PLUS CTX - 36 INCH (36/BX)

## (undated) DEVICE — CONTAINER SPECIMEN BAG OR - STERILE 4 OZ W/LID (100EA/CA)

## (undated) DEVICE — SYRINGE SAFETY 5 ML 18 GA X 1-1/2 BLUNT LL (100/BX 4BX/CA)

## (undated) DEVICE — SUTURE GENERAL

## (undated) DEVICE — SYRINGE SAFETY 10 ML 18 GA X 1 1/2 BLUNT LL (100/BX 4BX/CA)

## (undated) DEVICE — SYRINGE 12 CC LUER TIP - (80/BX) OBSOLETE ITEM

## (undated) DEVICE — SUTURE 2-0 VICRYL PLUS CTX - 36 INCH (36/BX)

## (undated) DEVICE — PACK TOTAL HIP - (1/CA)